# Patient Record
Sex: MALE | Race: WHITE | NOT HISPANIC OR LATINO
[De-identification: names, ages, dates, MRNs, and addresses within clinical notes are randomized per-mention and may not be internally consistent; named-entity substitution may affect disease eponyms.]

---

## 2018-01-18 ENCOUNTER — APPOINTMENT (OUTPATIENT)
Dept: FAMILY MEDICINE | Facility: CLINIC | Age: 73
End: 2018-01-18
Payer: MEDICARE

## 2018-01-18 VITALS — SYSTOLIC BLOOD PRESSURE: 122 MMHG | DIASTOLIC BLOOD PRESSURE: 70 MMHG | TEMPERATURE: 97.8 F

## 2018-01-18 PROCEDURE — 99213 OFFICE O/P EST LOW 20 MIN: CPT

## 2018-01-24 LAB — HBA1C MFR BLD HPLC: 5.5

## 2018-02-15 ENCOUNTER — RX RENEWAL (OUTPATIENT)
Age: 73
End: 2018-02-15

## 2018-02-26 ENCOUNTER — APPOINTMENT (OUTPATIENT)
Dept: FAMILY MEDICINE | Facility: CLINIC | Age: 73
End: 2018-02-26
Payer: MEDICARE

## 2018-02-26 ENCOUNTER — NON-APPOINTMENT (OUTPATIENT)
Age: 73
End: 2018-02-26

## 2018-02-26 VITALS
HEART RATE: 66 BPM | DIASTOLIC BLOOD PRESSURE: 80 MMHG | HEIGHT: 70 IN | WEIGHT: 260 LBS | SYSTOLIC BLOOD PRESSURE: 150 MMHG | RESPIRATION RATE: 17 BRPM | OXYGEN SATURATION: 98 % | TEMPERATURE: 97.5 F | BODY MASS INDEX: 37.22 KG/M2

## 2018-02-26 PROCEDURE — 93000 ELECTROCARDIOGRAM COMPLETE: CPT | Mod: 59

## 2018-02-26 PROCEDURE — G0438: CPT

## 2018-02-26 PROCEDURE — 90670 PCV13 VACCINE IM: CPT

## 2018-02-26 PROCEDURE — G0009: CPT

## 2018-03-02 ENCOUNTER — RX RENEWAL (OUTPATIENT)
Age: 73
End: 2018-03-02

## 2018-05-03 ENCOUNTER — APPOINTMENT (OUTPATIENT)
Dept: FAMILY MEDICINE | Facility: CLINIC | Age: 73
End: 2018-05-03
Payer: MEDICARE

## 2018-05-03 VITALS — DIASTOLIC BLOOD PRESSURE: 80 MMHG | TEMPERATURE: 98.1 F | SYSTOLIC BLOOD PRESSURE: 140 MMHG

## 2018-05-03 PROCEDURE — 99214 OFFICE O/P EST MOD 30 MIN: CPT

## 2018-05-15 ENCOUNTER — MESSAGE (OUTPATIENT)
Age: 73
End: 2018-05-15

## 2018-06-22 ENCOUNTER — APPOINTMENT (OUTPATIENT)
Dept: NEPHROLOGY | Facility: CLINIC | Age: 73
End: 2018-06-22
Payer: MEDICARE

## 2018-06-22 VITALS
SYSTOLIC BLOOD PRESSURE: 148 MMHG | HEIGHT: 70 IN | HEART RATE: 64 BPM | WEIGHT: 251.32 LBS | BODY MASS INDEX: 35.98 KG/M2 | DIASTOLIC BLOOD PRESSURE: 80 MMHG

## 2018-06-22 DIAGNOSIS — Z82.49 FAMILY HISTORY OF ISCHEMIC HEART DISEASE AND OTHER DISEASES OF THE CIRCULATORY SYSTEM: ICD-10-CM

## 2018-06-22 DIAGNOSIS — Z84.1 FAMILY HISTORY OF DISORDERS OF KIDNEY AND URETER: ICD-10-CM

## 2018-06-22 DIAGNOSIS — Z87.891 PERSONAL HISTORY OF NICOTINE DEPENDENCE: ICD-10-CM

## 2018-06-22 PROCEDURE — 36415 COLL VENOUS BLD VENIPUNCTURE: CPT

## 2018-06-22 PROCEDURE — 99205 OFFICE O/P NEW HI 60 MIN: CPT | Mod: 25

## 2018-06-22 RX ORDER — OSELTAMIVIR PHOSPHATE 75 MG/1
75 CAPSULE ORAL
Qty: 10 | Refills: 0 | Status: DISCONTINUED | COMMUNITY
Start: 2018-05-16

## 2018-06-24 ENCOUNTER — FORM ENCOUNTER (OUTPATIENT)
Age: 73
End: 2018-06-24

## 2018-06-25 ENCOUNTER — APPOINTMENT (OUTPATIENT)
Dept: ULTRASOUND IMAGING | Facility: HOSPITAL | Age: 73
End: 2018-06-25
Payer: MEDICARE

## 2018-06-25 ENCOUNTER — OUTPATIENT (OUTPATIENT)
Dept: OUTPATIENT SERVICES | Facility: HOSPITAL | Age: 73
LOS: 1 days | End: 2018-06-25
Payer: MEDICARE

## 2018-06-25 DIAGNOSIS — R80.9 PROTEINURIA, UNSPECIFIED: ICD-10-CM

## 2018-06-25 PROCEDURE — 76775 US EXAM ABDO BACK WALL LIM: CPT | Mod: 26

## 2018-06-25 PROCEDURE — 76775 US EXAM ABDO BACK WALL LIM: CPT

## 2018-06-26 PROBLEM — Z82.49 FAMILY HISTORY OF CORONARY ARTERY DISEASE: Status: ACTIVE | Noted: 2018-06-26

## 2018-06-26 PROBLEM — Z84.1 FAMILY HISTORY OF KIDNEY DISEASE: Status: ACTIVE | Noted: 2018-06-26

## 2018-06-28 LAB
25(OH)D3 SERPL-MCNC: 29.2 NG/ML
ALBUMIN SERPL ELPH-MCNC: 4.4 G/DL
ALP BLD-CCNC: 81 U/L
ALT SERPL-CCNC: 14 U/L
ANION GAP SERPL CALC-SCNC: 15 MMOL/L
APPEARANCE: CLEAR
AST SERPL-CCNC: 18 U/L
BACTERIA: NEGATIVE
BILIRUB SERPL-MCNC: 0.2 MG/DL
BILIRUBIN URINE: NEGATIVE
BLOOD URINE: ABNORMAL
BUN SERPL-MCNC: 24 MG/DL
CALCIUM SERPL-MCNC: 9.4 MG/DL
CALCIUM SERPL-MCNC: 9.4 MG/DL
CHLORIDE SERPL-SCNC: 104 MMOL/L
CO2 SERPL-SCNC: 23 MMOL/L
COLOR: YELLOW
CREAT SERPL-MCNC: 1.06 MG/DL
CREAT SPEC-SCNC: 108 MG/DL
GLUCOSE QUALITATIVE U: NEGATIVE MG/DL
GLUCOSE SERPL-MCNC: 120 MG/DL
HYALINE CASTS: 11 /LPF
KETONES URINE: NEGATIVE
LEUKOCYTE ESTERASE URINE: NEGATIVE
MICROALBUMIN 24H UR DL<=1MG/L-MCNC: 112.6 MG/DL
MICROALBUMIN/CREAT 24H UR-RTO: 1043 MG/G
MICROSCOPIC-UA: NORMAL
NITRITE URINE: NEGATIVE
PARATHYROID HORMONE INTACT: 94 PG/ML
PH URINE: 5.5
PHOSPHATE SERPL-MCNC: 3.1 MG/DL
POTASSIUM SERPL-SCNC: 4.4 MMOL/L
PROT SERPL-MCNC: 6.8 G/DL
PROTEIN URINE: 300 MG/DL
RED BLOOD CELLS URINE: 4 /HPF
SODIUM SERPL-SCNC: 142 MMOL/L
SPECIFIC GRAVITY URINE: 1.02
SQUAMOUS EPITHELIAL CELLS: 5 /HPF
UROBILINOGEN URINE: NEGATIVE MG/DL
WHITE BLOOD CELLS URINE: 1 /HPF

## 2018-07-10 LAB
CREAT 24H UR-MCNC: 1.5 G/24 H
CREAT ?TM UR-MCNC: 91 MG/DL
CREAT ?TM UR-MCNC: 91 MG/DL
CREAT ?TM UR-MCNC: 93 MG/DL
PROT 24H UR-MRATE: 115 MG/DL
PROT ?TM UR-MCNC: 24 HR
PROT UR-MCNC: 1898 MG/24 H
SPECIMEN VOL 24H UR: 1650 ML
U UREA: 882 MG/DL
UUN 24H UR-MRATE: 14.6 G/24H

## 2018-07-16 ENCOUNTER — APPOINTMENT (OUTPATIENT)
Dept: FAMILY MEDICINE | Facility: CLINIC | Age: 73
End: 2018-07-16
Payer: MEDICARE

## 2018-07-16 VITALS — TEMPERATURE: 98.1 F | DIASTOLIC BLOOD PRESSURE: 82 MMHG | SYSTOLIC BLOOD PRESSURE: 134 MMHG

## 2018-07-16 PROCEDURE — 99214 OFFICE O/P EST MOD 30 MIN: CPT

## 2018-07-16 NOTE — HISTORY OF PRESENT ILLNESS
[de-identified] : Presents to office for followup on his blood pressure. Patient recently saw Dr. Torres, nephrologist and her  evaluation of his proteinuria patient had a renal sonogram which was negative. Blood pressure  needs some improvement. Patient having a difficult time losing weight

## 2018-07-16 NOTE — ASSESSMENT
[FreeTextEntry1] : Presents to office for followup on his blood pressure. Patient recently saw Dr. Torres, nephrologist and her  evaluation of his proteinuria patient had a renal sonogram which was negative. Blood pressure  needs some improvement. Patient having a difficult time losing weight\par \par Pt and I discussed and was encouraged to lose weight\par Will start irbesartan 150mg\par RTO in 4 weeks

## 2018-08-16 ENCOUNTER — APPOINTMENT (OUTPATIENT)
Dept: FAMILY MEDICINE | Facility: CLINIC | Age: 73
End: 2018-08-16
Payer: MEDICARE

## 2018-08-16 VITALS — SYSTOLIC BLOOD PRESSURE: 112 MMHG | DIASTOLIC BLOOD PRESSURE: 62 MMHG

## 2018-08-16 PROCEDURE — 99213 OFFICE O/P EST LOW 20 MIN: CPT

## 2018-08-16 NOTE — ASSESSMENT
[FreeTextEntry1] : pt presents to office for followup on his blood pressure. Patient has been taking irbesartan for the last 4 weeks office no complaints today. Still has not lost any weight and compliant with diet.\par \par BP stable . Continue with Irbesartan\par RTO in 3 months

## 2018-08-16 NOTE — PHYSICAL EXAM
[No Acute Distress] : no acute distress [Clear to Auscultation] : lungs were clear to auscultation bilaterally [Regular Rhythm] : with a regular rhythm

## 2018-08-16 NOTE — HISTORY OF PRESENT ILLNESS
[de-identified] : pt presents to office for followup on his blood pressure. Patient has been taking irbesartan for the last 4 weeks office no complaints today. Still has not lost any weight and compliant with diet.

## 2018-12-04 ENCOUNTER — APPOINTMENT (OUTPATIENT)
Dept: FAMILY MEDICINE | Facility: CLINIC | Age: 73
End: 2018-12-04
Payer: MEDICARE

## 2018-12-04 VITALS — SYSTOLIC BLOOD PRESSURE: 135 MMHG | DIASTOLIC BLOOD PRESSURE: 71 MMHG

## 2018-12-04 DIAGNOSIS — M25.561 PAIN IN RIGHT KNEE: ICD-10-CM

## 2018-12-04 DIAGNOSIS — R29.898 OTHER SYMPTOMS AND SIGNS INVOLVING THE MUSCULOSKELETAL SYSTEM: ICD-10-CM

## 2018-12-04 PROCEDURE — 83036 HEMOGLOBIN GLYCOSYLATED A1C: CPT | Mod: QW

## 2018-12-04 PROCEDURE — 99215 OFFICE O/P EST HI 40 MIN: CPT | Mod: 25

## 2018-12-10 LAB — HBA1C MFR BLD HPLC: 6

## 2018-12-14 ENCOUNTER — LABORATORY RESULT (OUTPATIENT)
Age: 73
End: 2018-12-14

## 2018-12-14 ENCOUNTER — APPOINTMENT (OUTPATIENT)
Dept: NEPHROLOGY | Facility: CLINIC | Age: 73
End: 2018-12-14
Payer: MEDICARE

## 2018-12-14 VITALS — DIASTOLIC BLOOD PRESSURE: 80 MMHG | SYSTOLIC BLOOD PRESSURE: 146 MMHG

## 2018-12-14 VITALS
DIASTOLIC BLOOD PRESSURE: 94 MMHG | HEART RATE: 75 BPM | SYSTOLIC BLOOD PRESSURE: 152 MMHG | BODY MASS INDEX: 37.24 KG/M2 | HEIGHT: 70 IN | WEIGHT: 260.14 LBS | OXYGEN SATURATION: 95 %

## 2018-12-14 PROCEDURE — 99214 OFFICE O/P EST MOD 30 MIN: CPT

## 2019-02-14 ENCOUNTER — LABORATORY RESULT (OUTPATIENT)
Age: 74
End: 2019-02-14

## 2019-02-14 LAB
APPEARANCE: CLEAR
BILIRUBIN URINE: NEGATIVE
BLOOD URINE: ABNORMAL
COLOR: YELLOW
GLUCOSE QUALITATIVE U: NEGATIVE MG/DL
KETONES URINE: NEGATIVE
LEUKOCYTE ESTERASE URINE: NEGATIVE
NITRITE URINE: NEGATIVE
PH URINE: 6.5
PROTEIN URINE: 100 MG/DL
SPECIFIC GRAVITY URINE: 1.02
UROBILINOGEN URINE: NEGATIVE MG/DL

## 2019-02-15 LAB
CREAT SPEC-SCNC: 109 MG/DL
MICROALBUMIN 24H UR DL<=1MG/L-MCNC: 60 MG/DL
MICROALBUMIN/CREAT 24H UR-RTO: 550 MG/G

## 2019-03-04 ENCOUNTER — RX RENEWAL (OUTPATIENT)
Age: 74
End: 2019-03-04

## 2019-03-26 ENCOUNTER — RX RENEWAL (OUTPATIENT)
Age: 74
End: 2019-03-26

## 2019-03-29 ENCOUNTER — RX RENEWAL (OUTPATIENT)
Age: 74
End: 2019-03-29

## 2019-04-09 ENCOUNTER — APPOINTMENT (OUTPATIENT)
Dept: ORTHOPEDIC SURGERY | Facility: CLINIC | Age: 74
End: 2019-04-09
Payer: MEDICARE

## 2019-04-09 VITALS — BODY MASS INDEX: 37.22 KG/M2 | WEIGHT: 260 LBS | HEIGHT: 70 IN

## 2019-04-09 DIAGNOSIS — M79.89 OTHER SPECIFIED SOFT TISSUE DISORDERS: ICD-10-CM

## 2019-04-09 PROCEDURE — 73110 X-RAY EXAM OF WRIST: CPT | Mod: LT

## 2019-04-09 PROCEDURE — 99202 OFFICE O/P NEW SF 15 MIN: CPT

## 2019-04-09 NOTE — CONSULT LETTER
[Dear  ___] : Dear  [unfilled], [Consult Letter:] : I had the pleasure of evaluating your patient, [unfilled]. [Please see my note below.] : Please see my note below. [Sincerely,] : Sincerely, [FreeTextEntry2] : GAEL GOLDSTEIN [FreeTextEntry3] : Dr. Alexy Johnson

## 2019-04-09 NOTE — HISTORY OF PRESENT ILLNESS
[de-identified] : Patient is a RHD 73 year old male who presents c/o left dorsal wrist pain for 1-2x months. He has pain when lifting objects and are worsened with twisting motions of the wrist. The symptoms are localized over the dorsum of the wrist. He denies injury to the area and states that the pain was acute in nature. He denies tingling or numbness in the hand and fingers. Patient has not received medical treatment for this condition prior to today's visit and has additionally not attempted at home treatment.

## 2019-04-09 NOTE — ADDENDUM
[FreeTextEntry1] : I, Nelda Davis wrote this note acting as a scribe for Dr. Alexy Johnson on Apr 09, 2019.

## 2019-04-09 NOTE — DISCUSSION/SUMMARY
[de-identified] : The underlying pathophysiology was reviewed with the patient. Treatment options were discussed including; observation, NSAIDS, analgesics, bracing, and injection. \par \par Left wrist interosseus bone cysts.  \par Patient was advised to wear a wrist brace while performing strenuous activity and removed it when he is not. \par NSAIDs as tolerated. \par The patient was advised to soak hand in warm water and Epsom salt. \par He may return to this office for a cortisone injection if his symptoms persist or worsen. \par

## 2019-04-09 NOTE — END OF VISIT
[FreeTextEntry3] : I, Alexy Johnson MD, ordering physician, have read and attest that all the information, medical decision making and discharge instructions within are true and accurate.

## 2019-06-17 ENCOUNTER — OTHER (OUTPATIENT)
Age: 74
End: 2019-06-17

## 2019-06-17 ENCOUNTER — APPOINTMENT (OUTPATIENT)
Dept: NEPHROLOGY | Facility: CLINIC | Age: 74
End: 2019-06-17
Payer: MEDICARE

## 2019-06-17 VITALS
SYSTOLIC BLOOD PRESSURE: 138 MMHG | DIASTOLIC BLOOD PRESSURE: 85 MMHG | HEART RATE: 69 BPM | BODY MASS INDEX: 36.93 KG/M2 | OXYGEN SATURATION: 95 % | WEIGHT: 257.94 LBS | HEIGHT: 70 IN

## 2019-06-17 PROCEDURE — 99213 OFFICE O/P EST LOW 20 MIN: CPT

## 2019-06-17 NOTE — ASSESSMENT
[FreeTextEntry1] : 72 yo male with sub-nephrotic proteinuria, HTN, CLL, overweight\par Proteinuria may be secondary to hyperfiltration of being overweight with suboptimal BP control\par Monitor and trend proteinuria \par Modest protein intake\par Recheck flavia:cr ratio\par We will try to get lab test results from Dr. Sparks's office\par HTN: Relatively controlled. Consider optimization of ARB therapy for optimal BP control and proteinuric effects if needed\par Wt loss advised\par Avoid any chronic NSAID use \par All questions were answered

## 2019-06-17 NOTE — PHYSICAL EXAM
[General Appearance - Alert] : alert [General Appearance - In No Acute Distress] : in no acute distress [General Appearance - Well Nourished] : well nourished [Sclera] : the sclera and conjunctiva were normal [Neck Appearance] : the appearance of the neck was normal [Auscultation Breath Sounds / Voice Sounds] : lungs were clear to auscultation bilaterally [Heart Rate And Rhythm] : heart rate was normal and rhythm regular [Heart Sounds] : normal S1 and S2 [Murmurs] : no murmurs [Heart Sounds Pericardial Friction Rub] : no pericardial rub [Edema] : there was no peripheral edema [Bowel Sounds] : normal bowel sounds [Abdomen Soft] : soft [Abdomen Tenderness] : non-tender [Involuntary Movements] : no involuntary movements were seen [] : no rash [No Focal Deficits] : no focal deficits [Affect] : the affect was normal [Oriented To Time, Place, And Person] : oriented to person, place, and time [Mood] : the mood was normal

## 2019-06-17 NOTE — HISTORY OF PRESENT ILLNESS
[FreeTextEntry1] : 74 yo male with history of HTN 15 yrs, CLL, here for followup of proteinuria. \par He follows with Dr. Mu Sparks for CLL and reportedly had labs done there.\par He has left wrist pain and saw a neurologist and  orthopedist.  As per patient he has no true diagnosis.\par He intermittently takes Advil for pain but rare.\par

## 2019-06-19 LAB
APPEARANCE: CLEAR
BACTERIA: NEGATIVE
BILIRUBIN URINE: NEGATIVE
BLOOD URINE: NEGATIVE
COLOR: NORMAL
CREAT SPEC-SCNC: 116 MG/DL
GLUCOSE QUALITATIVE U: NEGATIVE
HYALINE CASTS: 1 /LPF
KETONES URINE: NEGATIVE
LEUKOCYTE ESTERASE URINE: NEGATIVE
MICROALBUMIN 24H UR DL<=1MG/L-MCNC: 54.7 MG/DL
MICROALBUMIN/CREAT 24H UR-RTO: 472 MG/G
MICROSCOPIC-UA: NORMAL
NITRITE URINE: NEGATIVE
PH URINE: 6
PROTEIN URINE: ABNORMAL
RED BLOOD CELLS URINE: 2 /HPF
SPECIFIC GRAVITY URINE: 1.02
SQUAMOUS EPITHELIAL CELLS: 2 /HPF
UROBILINOGEN URINE: NORMAL
WHITE BLOOD CELLS URINE: 1 /HPF

## 2019-08-01 ENCOUNTER — APPOINTMENT (OUTPATIENT)
Dept: ORTHOPEDIC SURGERY | Facility: CLINIC | Age: 74
End: 2019-08-01
Payer: MEDICARE

## 2019-08-01 DIAGNOSIS — M65.9 SYNOVITIS AND TENOSYNOVITIS, UNSPECIFIED: ICD-10-CM

## 2019-08-01 PROCEDURE — 99213 OFFICE O/P EST LOW 20 MIN: CPT | Mod: 25

## 2019-08-01 PROCEDURE — 20605 DRAIN/INJ JOINT/BURSA W/O US: CPT | Mod: LT

## 2019-08-01 NOTE — HISTORY OF PRESENT ILLNESS
[de-identified] : Patient is a RHD 73 year old male who presents who presents for a followup visit involving the left dorsal wrist pain since February 2018. He was last seen in the office on 04/09/2019 and was diagnosed with left wrist interosseus bone cysts and was treated conservatively with a brace. He did not receive a cortisone injection. Today he states that he continues to have pain when lifting objects, worsened with twisting motions of the wrist. There is marginal improvement compared to his initial visit. The symptoms are localized over the dorsum of the wrist. He denies injury to the area and states that the pain was acute in nature. He denies tingling or numbness in the hand and fingers.

## 2019-08-01 NOTE — PHYSICAL EXAM
[de-identified] : Patient is WDWN, alert, and in no acute distress. Breathing is unlabored. He is grossly oriented to person, place, and time. \par \par Left Wrist: Wrist brace was removed for examination. There is no tenderness to palpation. No swelling or deformities. No discoloration. The ROM is full. Pain with rotation. There is no joint instability on provocative testing. Sensation is intact to light touch. \par Strength: extension, flexion, ulnar deviation and radial deviation 5/5.  [de-identified] : Reviewed from 04/09/2019: AP, lateral and oblique views of the left wrist were obtained today and revealed carpal  interosseous bone cysts. No evidence of fx or osteoarthritis.

## 2019-08-01 NOTE — ADDENDUM
[FreeTextEntry1] : I, Nelda Davis wrote this note acting as a scribe for Dr. Alexy Johnson on Aug 01, 2019.

## 2019-08-01 NOTE — DISCUSSION/SUMMARY
[de-identified] : The patient wishes to proceed with a cortisone injection at this time. The skin was prepped with alcohol and sprayed with Ethyl Chloride. An injection of 0.5 cc 1% Lidocaine without epinephrine, 0.25 cc Kenalog 40 mg, and 0.25 cc Dexamethasone was administered into the left dorsal radiocarpal joint space. The patient tolerated the procedure well. Apply ice. Follow up as needed.\par  \par Patient was advised to continue to wear the wrist brace while performing strenuous activity and removed it when he is not. \par The patient was advised to soak hand in warm water and Epsom salt. \par NSAIDs as tolerated. \par He may return to this office for another cortisone injection if his symptoms persist or worsen. \par

## 2019-09-01 ENCOUNTER — RX RENEWAL (OUTPATIENT)
Age: 74
End: 2019-09-01

## 2019-11-30 ENCOUNTER — RX RENEWAL (OUTPATIENT)
Age: 74
End: 2019-11-30

## 2020-01-14 ENCOUNTER — APPOINTMENT (OUTPATIENT)
Dept: NEPHROLOGY | Facility: CLINIC | Age: 75
End: 2020-01-14
Payer: MEDICARE

## 2020-01-14 VITALS
BODY MASS INDEX: 37.01 KG/M2 | SYSTOLIC BLOOD PRESSURE: 125 MMHG | HEART RATE: 85 BPM | OXYGEN SATURATION: 95 % | DIASTOLIC BLOOD PRESSURE: 76 MMHG | WEIGHT: 257.94 LBS

## 2020-01-14 PROCEDURE — 99213 OFFICE O/P EST LOW 20 MIN: CPT

## 2020-01-14 NOTE — HISTORY OF PRESENT ILLNESS
[FreeTextEntry1] : 75 yo male with history of HTN 15 yrs, CLL, here for followup of proteinuria. \par He follows with Dr. Mu Sparks for CLL and had labs done there.\par He was diagnosed with early parkinson's by Dr Eddy and started on sinemet\par Kids bought a stationary bike

## 2020-01-14 NOTE — ASSESSMENT
[FreeTextEntry1] : 73 yo male with sub-nephrotic proteinuria, HTN, CLL, overweight\par Proteinuria may be secondary to hyperfiltration of being overweight \par Monitor and trend proteinuria \par Modest protein intake\par Recheck flavia:cr ratio\par We will try to get lab test results from Dr. Sparks's office\par HTN: well controlled. \par Wt loss advised\par Avoid any chronic NSAID use \par All questions were answered

## 2020-01-14 NOTE — PHYSICAL EXAM
[General Appearance - Alert] : alert [General Appearance - In No Acute Distress] : in no acute distress [General Appearance - Well Nourished] : well nourished [Sclera] : the sclera and conjunctiva were normal [Outer Ear] : the ears and nose were normal in appearance [Neck Appearance] : the appearance of the neck was normal [Auscultation Breath Sounds / Voice Sounds] : lungs were clear to auscultation bilaterally [Heart Rate And Rhythm] : heart rate was normal and rhythm regular [Heart Sounds] : normal S1 and S2 [Murmurs] : no murmurs [Heart Sounds Pericardial Friction Rub] : no pericardial rub [Edema] : there was no peripheral edema [Abdomen Soft] : soft [Bowel Sounds] : normal bowel sounds [Abdomen Tenderness] : non-tender [Involuntary Movements] : no involuntary movements were seen [No Focal Deficits] : no focal deficits [] : no rash [Oriented To Time, Place, And Person] : oriented to person, place, and time [Affect] : the affect was normal [Mood] : the mood was normal

## 2020-01-16 LAB
APPEARANCE: CLEAR
BACTERIA: NEGATIVE
BILIRUBIN URINE: NEGATIVE
BLOOD URINE: NEGATIVE
COLOR: NORMAL
CREAT SPEC-SCNC: 106 MG/DL
GLUCOSE QUALITATIVE U: NEGATIVE
HYALINE CASTS: 1 /LPF
KETONES URINE: NEGATIVE
LEUKOCYTE ESTERASE URINE: NEGATIVE
MICROALBUMIN 24H UR DL<=1MG/L-MCNC: 46.1 MG/DL
MICROALBUMIN/CREAT 24H UR-RTO: 435 MG/G
MICROSCOPIC-UA: NORMAL
NITRITE URINE: NEGATIVE
PH URINE: 6
PROTEIN URINE: ABNORMAL
RED BLOOD CELLS URINE: 4 /HPF
SPECIFIC GRAVITY URINE: 1.02
SQUAMOUS EPITHELIAL CELLS: 3 /HPF
UROBILINOGEN URINE: NORMAL
WHITE BLOOD CELLS URINE: 2 /HPF

## 2020-02-25 ENCOUNTER — APPOINTMENT (OUTPATIENT)
Dept: FAMILY MEDICINE | Facility: CLINIC | Age: 75
End: 2020-02-25
Payer: MEDICARE

## 2020-02-25 VITALS
SYSTOLIC BLOOD PRESSURE: 120 MMHG | OXYGEN SATURATION: 93 % | TEMPERATURE: 98 F | DIASTOLIC BLOOD PRESSURE: 70 MMHG | RESPIRATION RATE: 18 BRPM | HEART RATE: 79 BPM

## 2020-02-25 PROCEDURE — 99214 OFFICE O/P EST MOD 30 MIN: CPT

## 2020-02-25 NOTE — PHYSICAL EXAM
[Normal] : no acute distress, well-nourished, well developed, well appearing [de-identified] : AA&Ox3

## 2020-02-25 NOTE — PLAN
[FreeTextEntry1] : Obesity\par - Encouraged exercise, weight loss, and healthy diet\par - F/u in 8 weeks for weight check\par \par Hypothyroidism\par - Blood work to include TSH, drawn in office today\par \par Health maintenance\par - Flu and PNA vaccine UTD\par \par Renewed medications

## 2020-02-25 NOTE — ADDENDUM
[FreeTextEntry1] : I, Maria Eugenia Juradoin, acted solely as a scribe for Dr. Berry on this date 02/25/2020.\par \par All medical record entries made by the Scribe were at my, Dr. Berry, direction and personally dictated by me on 02/25/2020. I have reviewed the chart and agree that the record accurately reflects my personal performance of the history, physical exam, assessment and plan.  I have also personally directed, reviewed and agreed with the chart.

## 2020-02-25 NOTE — HISTORY OF PRESENT ILLNESS
[de-identified] : 75y/o M with PMHx of CLL, HTN (Metoprolol, Irbesartan), HLD (Simvastatin), Hypothyroidism (Levothyroxine), BPH (Tamsulosin), and Gout (Allopurinol) presents to the office for medication renewal. Pt saw nephro 1 month ago for proteinuria, suspects it is secondary to hyperfiltration of being overweight. Pt states he was recently dx with Parkinsonism due to shuffling gait and tremor of left hand, and was put on Sinemet. Pt states tremor of left hand has decreased with medication. Recently started PT for gait and exercise. BP in office is 120/70. Offers no new complaints.

## 2020-02-27 LAB — TSH SERPL-ACNC: 2.93 UIU/ML

## 2020-03-07 ENCOUNTER — RX RENEWAL (OUTPATIENT)
Age: 75
End: 2020-03-07

## 2020-04-08 ENCOUNTER — APPOINTMENT (OUTPATIENT)
Dept: DISASTER EMERGENCY | Facility: CLINIC | Age: 75
End: 2020-04-08

## 2020-05-04 ENCOUNTER — APPOINTMENT (OUTPATIENT)
Dept: FAMILY MEDICINE | Facility: CLINIC | Age: 75
End: 2020-05-04
Payer: MEDICARE

## 2020-05-04 DIAGNOSIS — R53.1 WEAKNESS: ICD-10-CM

## 2020-05-04 DIAGNOSIS — U07.1 COVID-19: ICD-10-CM

## 2020-05-04 DIAGNOSIS — R53.83 OTHER FATIGUE: ICD-10-CM

## 2020-05-04 PROCEDURE — 99214 OFFICE O/P EST MOD 30 MIN: CPT | Mod: CS,95

## 2020-05-04 NOTE — REVIEW OF SYSTEMS
[Fatigue] : fatigue [Recent Change In Weight] : ~T recent weight change [Muscle Weakness] : muscle weakness [Unsteady Walk] : ataxia [Negative] : Respiratory

## 2020-05-19 ENCOUNTER — APPOINTMENT (OUTPATIENT)
Dept: FAMILY MEDICINE | Facility: CLINIC | Age: 75
End: 2020-05-19
Payer: MEDICARE

## 2020-05-19 PROCEDURE — 99442: CPT | Mod: 95

## 2020-05-26 ENCOUNTER — APPOINTMENT (OUTPATIENT)
Dept: FAMILY MEDICINE | Facility: CLINIC | Age: 75
End: 2020-05-26
Payer: MEDICARE

## 2020-05-26 PROCEDURE — 99442: CPT | Mod: 95

## 2020-06-05 ENCOUNTER — APPOINTMENT (OUTPATIENT)
Dept: FAMILY MEDICINE | Facility: CLINIC | Age: 75
End: 2020-06-05
Payer: MEDICARE

## 2020-06-05 VITALS — SYSTOLIC BLOOD PRESSURE: 122 MMHG | TEMPERATURE: 97.9 F | DIASTOLIC BLOOD PRESSURE: 76 MMHG

## 2020-06-05 VITALS — WEIGHT: 232 LBS | BODY MASS INDEX: 33.29 KG/M2

## 2020-06-05 DIAGNOSIS — R39.9 UNSPECIFIED SYMPTOMS AND SIGNS INVOLVING THE GENITOURINARY SYSTEM: ICD-10-CM

## 2020-06-05 LAB
BILIRUB UR QL STRIP: NEGATIVE
CLARITY UR: NORMAL
COLLECTION METHOD: NORMAL
GLUCOSE UR-MCNC: NEGATIVE
HCG UR QL: 3.2 EU/DL
HGB UR QL STRIP.AUTO: ABNORMAL
KETONES UR-MCNC: NORMAL
LEUKOCYTE ESTERASE UR QL STRIP: NEGATIVE
NITRITE UR QL STRIP: NEGATIVE
PH UR STRIP: 5.5
PROT UR STRIP-MCNC: ABNORMAL
SP GR UR STRIP: 1.01

## 2020-06-05 PROCEDURE — 99214 OFFICE O/P EST MOD 30 MIN: CPT | Mod: 25

## 2020-06-05 PROCEDURE — 81003 URINALYSIS AUTO W/O SCOPE: CPT | Mod: QW

## 2020-06-05 NOTE — PHYSICAL EXAM
[Normal] : affect was normal and insight and judgment were intact [de-identified] : uses cane for ambulation

## 2020-06-05 NOTE — REVIEW OF SYSTEMS
[Nocturia] : nocturia [Frequency] : frequency [Joint Pain] : joint pain [Muscle Pain] : muscle pain [Back Pain] : back pain [Negative] : Integumentary

## 2020-06-08 LAB — BACTERIA UR CULT: NORMAL

## 2020-06-15 ENCOUNTER — APPOINTMENT (OUTPATIENT)
Dept: UROLOGY | Facility: CLINIC | Age: 75
End: 2020-06-15

## 2020-06-25 ENCOUNTER — APPOINTMENT (OUTPATIENT)
Dept: UROLOGY | Facility: CLINIC | Age: 75
End: 2020-06-25
Payer: MEDICARE

## 2020-06-25 LAB
BILIRUB UR QL STRIP: NORMAL
CLARITY UR: CLEAR
COLLECTION METHOD: NORMAL
GLUCOSE UR-MCNC: NEGATIVE
HCG UR QL: 0.2 EU/DL
HGB UR QL STRIP.AUTO: NEGATIVE
KETONES UR-MCNC: NEGATIVE
LEUKOCYTE ESTERASE UR QL STRIP: NEGATIVE
NITRITE UR QL STRIP: NEGATIVE
PH UR STRIP: 6
PROT UR STRIP-MCNC: 30
SP GR UR STRIP: 1.02

## 2020-06-25 PROCEDURE — 81002 URINALYSIS NONAUTO W/O SCOPE: CPT

## 2020-06-25 PROCEDURE — 51798 US URINE CAPACITY MEASURE: CPT

## 2020-06-25 PROCEDURE — 99204 OFFICE O/P NEW MOD 45 MIN: CPT | Mod: 25

## 2020-06-25 NOTE — HISTORY OF PRESENT ILLNESS
[Urinary Frequency] : urinary frequency [Nocturia] : nocturia [FreeTextEntry1] : CHA GONZALEZ is a 74 year M who presents for urinary frequency daytime and nighttime. Occasional incontinence with urge. Unsure if he empties fully- on tamsulosin. No gross hematuria.  F/u with Jason (Nephrology) and Dr. Berry---> has h/o microhematuria.  4 rbc 1/2020, dip 6/5/2020---> 2+ protein, 1+ blood. Brought urine from home today for eval.\par \par PMH: hypothyroid, gout, cholesterol, htn, BPH, Parkinson's, CLL, recent covid infection 3/2020, ckd\par PSH: vasectomy\par FH: no known sig  FH\par Meds: synthroid, simvastatin, allopurinol, tamsulosin, vit D3, asa, irbesartan, carvidopa-levodopa\par SH: former smoker (quit > 30 years ago), \par Allergies: NKDA\par

## 2020-06-25 NOTE — PHYSICAL EXAM
[General Appearance - Well Developed] : well developed [General Appearance - Well Nourished] : well nourished [Normal Appearance] : normal appearance [Well Groomed] : well groomed [General Appearance - In No Acute Distress] : no acute distress [Edema] : no peripheral edema [Respiration, Rhythm And Depth] : normal respiratory rhythm and effort [Exaggerated Use Of Accessory Muscles For Inspiration] : no accessory muscle use [Abdomen Soft] : soft [Abdomen Tenderness] : non-tender [Costovertebral Angle Tenderness] : no ~M costovertebral angle tenderness [Penis Abnormality] : normal circumcised penis [Urethral Meatus] : meatus normal [Scrotum] : the scrotum was normal [Epididymis] : the epididymides were normal [Urinary Bladder Findings] : the bladder was normal on palpation [Testes Mass (___cm)] : there were no testicular masses [Rectal Exam - Rectum] : digital rectal exam was normal [No Prostate Nodules] : no prostate nodules [Prostate Size ___ (0-4)] : prostate size [unfilled] (scale: 0-4) [Normal Station and Gait] : the gait and station were normal for the patient's age [] : no rash [No Focal Deficits] : no focal deficits [Affect] : the affect was normal [Oriented To Time, Place, And Person] : oriented to person, place, and time [No Palpable Adenopathy] : no palpable adenopathy [Not Anxious] : not anxious [Mood] : the mood was normal

## 2020-06-26 LAB
APPEARANCE: CLEAR
BACTERIA: NEGATIVE
BILIRUBIN URINE: NEGATIVE
BLOOD URINE: NEGATIVE
COLOR: NORMAL
GLUCOSE QUALITATIVE U: NEGATIVE
HYALINE CASTS: 1 /LPF
KETONES URINE: NEGATIVE
LEUKOCYTE ESTERASE URINE: NEGATIVE
MICROSCOPIC-UA: NORMAL
NITRITE URINE: NEGATIVE
PH URINE: 6
PROTEIN URINE: ABNORMAL
PSA SERPL-MCNC: 4.46 NG/ML
RED BLOOD CELLS URINE: 5 /HPF
SPECIFIC GRAVITY URINE: 1.01
SQUAMOUS EPITHELIAL CELLS: 3 /HPF
URINE CYTOLOGY: NORMAL
UROBILINOGEN URINE: NORMAL
WHITE BLOOD CELLS URINE: 2 /HPF

## 2020-07-15 ENCOUNTER — APPOINTMENT (OUTPATIENT)
Dept: UROLOGY | Facility: CLINIC | Age: 75
End: 2020-07-15

## 2020-07-15 ENCOUNTER — APPOINTMENT (OUTPATIENT)
Dept: NEPHROLOGY | Facility: CLINIC | Age: 75
End: 2020-07-15
Payer: MEDICARE

## 2020-07-15 ENCOUNTER — APPOINTMENT (OUTPATIENT)
Dept: ULTRASOUND IMAGING | Facility: IMAGING CENTER | Age: 75
End: 2020-07-15

## 2020-07-15 PROCEDURE — 99214 OFFICE O/P EST MOD 30 MIN: CPT | Mod: 95

## 2020-07-15 NOTE — HISTORY OF PRESENT ILLNESS
[Medical Office: ([facility])___] : at the medical office located in  [Home] : at home, [unfilled] , at the time of the visit. [Spouse] : spouse [Verbal consent obtained from patient] : the patient, [unfilled] [FreeTextEntry1] : 73 yo male with history of HTN 15 yrs, CLL, on a telehealth visit for follow-up for proteinuria. \par He was diagnosed with COVID the beginning of April was able to take care of it at home.  His wife is very involved.  He had fevers myalgias could not get out of bed, had diarrhea, and had delirium.  His creatinine was 2.0 which was higher than his baseline at that time.  Kept him out of the hospital.  He lost 20 pounds.  His wife had subsequently tested positive for antibodies.  Now getting physical therapy and feeling much better.\par He is also seeing urology for cystoscopy.\par He follows with Dr. Mu Sparks for CLL and follows with him.\par He was diagnosed with early parkinson's by Dr Eddy and still on sinemet\par He also follows with Dr. Mustapha Murcia, psychiatrist blood work apparently done with him recently.  Phone #[phone].\par Meds were reviewed.

## 2020-07-15 NOTE — REVIEW OF SYSTEMS
[Recent Weight Loss (___ Lbs)] : recent [unfilled] ~Ulb weight loss [Negative] : Heme/Lymph [de-identified] : Parkinson's

## 2020-07-15 NOTE — ASSESSMENT
[FreeTextEntry1] : 73 yo male with sub-nephrotic proteinuria, HTN, CLL, recent COVID\par Proteinuria : secondary to hyperfiltration of being overweight \par Monitor and trend proteinuria which appears to be better.\par Modest protein intake\par Recheck flavia:cr ratio in 6 months\par Getting cystoscopy with urology.\par We will try to get lab test results more recent to see if he recovered from his mild BRAEDEN during COVID.\par HTN: well controlled. \par Wt loss should be maintained.\par Avoid any chronic NSAID use \par All questions were answered\par Follow-up in 6 months.

## 2020-08-05 ENCOUNTER — OUTPATIENT (OUTPATIENT)
Dept: OUTPATIENT SERVICES | Facility: HOSPITAL | Age: 75
LOS: 1 days | End: 2020-08-05
Payer: MEDICARE

## 2020-08-05 ENCOUNTER — APPOINTMENT (OUTPATIENT)
Dept: UROLOGY | Facility: CLINIC | Age: 75
End: 2020-08-05
Payer: MEDICARE

## 2020-08-05 VITALS — HEART RATE: 82 BPM | RESPIRATION RATE: 17 BRPM | DIASTOLIC BLOOD PRESSURE: 87 MMHG | SYSTOLIC BLOOD PRESSURE: 146 MMHG

## 2020-08-05 VITALS — TEMPERATURE: 97.9 F

## 2020-08-05 DIAGNOSIS — R35.0 FREQUENCY OF MICTURITION: ICD-10-CM

## 2020-08-05 PROCEDURE — 76775 US EXAM ABDO BACK WALL LIM: CPT

## 2020-08-05 PROCEDURE — 52000 CYSTOURETHROSCOPY: CPT

## 2020-08-05 PROCEDURE — 76775 US EXAM ABDO BACK WALL LIM: CPT | Mod: 26

## 2020-08-06 DIAGNOSIS — N40.1 BENIGN PROSTATIC HYPERPLASIA WITH LOWER URINARY TRACT SYMPTOMS: ICD-10-CM

## 2020-08-06 DIAGNOSIS — R31.29 OTHER MICROSCOPIC HEMATURIA: ICD-10-CM

## 2020-12-11 NOTE — ASSESSMENT
Palliative Medicine Social Work      This SW met with patient for brief visit. Patient lying in bed watching TV. Alert and oriented to all spheres, anxiety about the same as yesterday. Provided referrals from [facility] (formerly [facility]). Patient does not remember speaking to MSW from [place] [id number] prior to admission. She had requested referrals for financial and estate planning. Information provided. Patient continues to express worries about being to manage bills, finances and paperwork on her own when she is home. Discussed plan for SNF. Patient wondering why she can't just go home. Discussed concern for patient safety if she discharges home without rehab. Patient considering. Will continue to follow for support. Thank you for the opportunity to be involved in the care of Ms. Yadira Finney and her family.     Sánchez Alonso LMSW, Supervisee in Social Work  Palliative Medicine   [phone]    Start time: 14:30  End time: 14:45 [FreeTextEntry1] : Bladder scan: 0 cc...\par voided previously at home> 1 hour prior\par \par No recent psa on outside labs-- will arrange today\par \par we discussed microscopic hematuria w/u, in pt with some degree of CKD--> most recent labs show improvement over prior with gfr 54. (6/2020).  Given sx of urge/incont, would recommend at least Renal US, +- MR urogram as opposed to CT scan (given the CKD history), and cystoscopy to fully eval.\par \par Can cont tamsulosin\par Could consider anticholinergic for OAB sx and urge incontinence.\par Options for urodynamics reviewed, but given apparent emptying on bladder scan will hold off at present\par \par RTC for renal US and cysto in next few weeks recommended\par psa, urine cytol, u/a today.\par

## 2021-01-08 ENCOUNTER — TRANSCRIPTION ENCOUNTER (OUTPATIENT)
Age: 76
End: 2021-01-08

## 2021-02-04 ENCOUNTER — APPOINTMENT (OUTPATIENT)
Dept: UROLOGY | Facility: CLINIC | Age: 76
End: 2021-02-04
Payer: MEDICARE

## 2021-02-04 PROCEDURE — 99213 OFFICE O/P EST LOW 20 MIN: CPT | Mod: 25

## 2021-02-04 PROCEDURE — 51798 US URINE CAPACITY MEASURE: CPT

## 2021-02-04 NOTE — HISTORY OF PRESENT ILLNESS
[FreeTextEntry1] : CHA GONZALEZ is a now 75 year M who presents for urinary frequency daytime and nighttime. Occasional incontinence with urge. Unsure if he empties fully- on tamsulosin. No gross hematuria. F/u with Jason (Nephrology) and Dr. Berry---> has h/o microhematuria. 4 rbc 1/2020, dip 6/5/2020---> 2+ protein, 1+ blood. Brought urine from home today for eval.\par \par Has not had any additional w/u since last appt 6/2020; urine cytology was negative, and PSA wnl for age-related range.\par \par Feeling no sig changes.\par \par Increased parkinson med from 3 to 4 times per day.\par \par No flank or abdominal pain, No gross hematuria.  Still with urge, occasional urge-incont- no sig change [Urinary Frequency] : urinary frequency [Nocturia] : nocturia

## 2021-02-04 NOTE — ASSESSMENT
[FreeTextEntry1] : Pt prefers to avoid cysto, CT imaging at this time.\par \par No changes to voiding sx. Increase to carb/levodopa reviewed and added.\par \par D/w pt- no urge to void now (prior to appt):\par \par Bladder scan today: 15 cc PVR.\par \par recommend again at least renal/bladder US- can be done at radiology\par \par will review by phone or telehealth.

## 2021-02-11 ENCOUNTER — RX CHANGE (OUTPATIENT)
Age: 76
End: 2021-02-11

## 2021-02-26 ENCOUNTER — APPOINTMENT (OUTPATIENT)
Dept: ULTRASOUND IMAGING | Facility: HOSPITAL | Age: 76
End: 2021-02-26

## 2021-03-08 ENCOUNTER — OUTPATIENT (OUTPATIENT)
Dept: OUTPATIENT SERVICES | Facility: HOSPITAL | Age: 76
LOS: 1 days | End: 2021-03-08
Payer: MEDICARE

## 2021-03-08 ENCOUNTER — RESULT REVIEW (OUTPATIENT)
Age: 76
End: 2021-03-08

## 2021-03-08 ENCOUNTER — APPOINTMENT (OUTPATIENT)
Dept: ULTRASOUND IMAGING | Facility: HOSPITAL | Age: 76
End: 2021-03-08
Payer: MEDICARE

## 2021-03-08 DIAGNOSIS — R31.29 OTHER MICROSCOPIC HEMATURIA: ICD-10-CM

## 2021-03-08 DIAGNOSIS — N40.1 BENIGN PROSTATIC HYPERPLASIA WITH LOWER URINARY TRACT SYMPTOMS: ICD-10-CM

## 2021-03-08 PROCEDURE — 76770 US EXAM ABDO BACK WALL COMP: CPT | Mod: 26

## 2021-03-08 PROCEDURE — 76770 US EXAM ABDO BACK WALL COMP: CPT

## 2021-03-09 ENCOUNTER — APPOINTMENT (OUTPATIENT)
Dept: UROLOGY | Facility: CLINIC | Age: 76
End: 2021-03-09
Payer: MEDICARE

## 2021-03-09 DIAGNOSIS — R31.29 OTHER MICROSCOPIC HEMATURIA: ICD-10-CM

## 2021-03-09 PROCEDURE — 99212 OFFICE O/P EST SF 10 MIN: CPT | Mod: 95

## 2021-04-21 NOTE — ASSESSMENT
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 unchanged single septation (thin) cyst right upper pole as well. No concerning renal mass, no hydro, no stone.\par \par Prostate enlarged, bladder with some residual, but likely situational- was only 15 cc PVR on recent visit 1 month ago.\par \par D/w pt and wife- will plan next f/u in approx 6 months

## 2021-04-21 NOTE — HISTORY OF PRESENT ILLNESS
[FreeTextEntry1] : The patient-doctor relationship has been established in a face to face fashion via real time video/audio HIPAA compliant communication using telemedicine software. The patient's identity has been confirmed. The patient was previously emailed a copy of the telemedicine consent. They have had a chance to review and has now given verbal consent and has requested care to be assessed and treated via telemedicine. They understand there may be limitations in this process, and that they may need further followup care in the office and/or hospital settings.\par \par Verbal consent given on Mar  9 2021 11:40AM\par \par CHA GONZALEZ is a 75 year M who presents for f/u of renal US. Seen 2/4/21, and f/u of neurogenic/overactive bladder in setting of Parkinson's disease. H/o microhematuria with w/u 6/2020.\par \par Renal US done to ensure no hydro; reviewed today with pt and wife. O/w feeling well at this time; no new complaints.\par \par 03/09/2021 \par \par The patient denies fevers, chills, nausea and/or vomiting, and no unexplained weight loss.\par \par All pertinent parts of the patient PFSH (past medical, family, and social histories), laboratory, radiological studies and available physician notes were reviewed prior to starting the face-to-face portion of the telemedicine visit. Questionnaire results, where appropriate, were discussed with the patient.\par

## 2021-05-06 ENCOUNTER — RX RENEWAL (OUTPATIENT)
Age: 76
End: 2021-05-06

## 2021-08-03 ENCOUNTER — NON-APPOINTMENT (OUTPATIENT)
Age: 76
End: 2021-08-03

## 2021-08-03 ENCOUNTER — LABORATORY RESULT (OUTPATIENT)
Age: 76
End: 2021-08-03

## 2021-08-23 ENCOUNTER — NON-APPOINTMENT (OUTPATIENT)
Age: 76
End: 2021-08-23

## 2021-08-23 ENCOUNTER — APPOINTMENT (OUTPATIENT)
Dept: FAMILY MEDICINE | Facility: CLINIC | Age: 76
End: 2021-08-23
Payer: MEDICARE

## 2021-08-23 VITALS
SYSTOLIC BLOOD PRESSURE: 138 MMHG | DIASTOLIC BLOOD PRESSURE: 78 MMHG | RESPIRATION RATE: 16 BRPM | OXYGEN SATURATION: 96 % | HEART RATE: 63 BPM | WEIGHT: 240 LBS | HEIGHT: 70 IN | BODY MASS INDEX: 34.36 KG/M2 | TEMPERATURE: 96.8 F

## 2021-08-23 DIAGNOSIS — R73.9 HYPERGLYCEMIA, UNSPECIFIED: ICD-10-CM

## 2021-08-23 LAB
ALBUMIN SERPL ELPH-MCNC: 4.6 G/DL
ALP BLD-CCNC: 120 U/L
ALT SERPL-CCNC: 10 U/L
ANION GAP SERPL CALC-SCNC: 15 MMOL/L
APPEARANCE: CLEAR
AST SERPL-CCNC: 17 U/L
BACTERIA: NEGATIVE
BASOPHILS # BLD AUTO: 0 K/UL
BASOPHILS NFR BLD AUTO: 0 %
BILIRUB SERPL-MCNC: 0.4 MG/DL
BILIRUBIN URINE: NEGATIVE
BLOOD URINE: NEGATIVE
BUN SERPL-MCNC: 21 MG/DL
CALCIUM SERPL-MCNC: 9.4 MG/DL
CHLORIDE SERPL-SCNC: 105 MMOL/L
CHOLEST SERPL-MCNC: 168 MG/DL
CO2 SERPL-SCNC: 21 MMOL/L
COLOR: NORMAL
CREAT SERPL-MCNC: 1.58 MG/DL
EOSINOPHIL # BLD AUTO: 0 K/UL
EOSINOPHIL NFR BLD AUTO: 0 %
ESTIMATED AVERAGE GLUCOSE: 120 MG/DL
FOLATE SERPL-MCNC: >20 NG/ML
GLUCOSE QUALITATIVE U: NEGATIVE
GLUCOSE SERPL-MCNC: 105 MG/DL
HBA1C MFR BLD HPLC: 5.8 %
HCT VFR BLD CALC: 45.8 %
HDLC SERPL-MCNC: 54 MG/DL
HGB BLD-MCNC: 13.9 G/DL
HYALINE CASTS: 0 /LPF
KETONES URINE: NEGATIVE
LDLC SERPL CALC-MCNC: 80 MG/DL
LEUKOCYTE ESTERASE URINE: NEGATIVE
LYMPHOCYTES # BLD AUTO: 45.29 K/UL
LYMPHOCYTES NFR BLD AUTO: 83 %
MAN DIFF?: NORMAL
MCHC RBC-ENTMCNC: 30.3 GM/DL
MCHC RBC-ENTMCNC: 31.5 PG
MCV RBC AUTO: 103.9 FL
MICROSCOPIC-UA: NORMAL
MONOCYTES # BLD AUTO: 2.18 K/UL
MONOCYTES NFR BLD AUTO: 4 %
NEUTROPHILS # BLD AUTO: 6 K/UL
NEUTROPHILS NFR BLD AUTO: 11 %
NITRITE URINE: NEGATIVE
NONHDLC SERPL-MCNC: 114 MG/DL
PH URINE: 7
PLATELET # BLD AUTO: 170 K/UL
POTASSIUM SERPL-SCNC: 4.6 MMOL/L
PROT SERPL-MCNC: 6.5 G/DL
PROTEIN URINE: ABNORMAL
PSA FREE FLD-MCNC: 15 %
PSA FREE SERPL-MCNC: 0.45 NG/ML
PSA SERPL-MCNC: 3.03 NG/ML
RBC # BLD: 4.41 M/UL
RBC # FLD: 13.4 %
RED BLOOD CELLS URINE: 3 /HPF
SODIUM SERPL-SCNC: 141 MMOL/L
SPECIFIC GRAVITY URINE: 1.02
SQUAMOUS EPITHELIAL CELLS: 3 /HPF
TRIGL SERPL-MCNC: 171 MG/DL
TSH SERPL-ACNC: 7.03 UIU/ML
UROBILINOGEN URINE: NORMAL
VIT B12 SERPL-MCNC: 444 PG/ML
WBC # FLD AUTO: 54.57 K/UL
WHITE BLOOD CELLS URINE: 2 /HPF

## 2021-08-23 PROCEDURE — G0439: CPT

## 2021-08-23 PROCEDURE — 93000 ELECTROCARDIOGRAM COMPLETE: CPT

## 2021-08-23 PROCEDURE — G0447 BEHAVIOR COUNSEL OBESITY 15M: CPT | Mod: 59

## 2021-09-13 ENCOUNTER — APPOINTMENT (OUTPATIENT)
Dept: FAMILY MEDICINE | Facility: CLINIC | Age: 76
End: 2021-09-13

## 2021-10-07 ENCOUNTER — APPOINTMENT (OUTPATIENT)
Dept: NEPHROLOGY | Facility: CLINIC | Age: 76
End: 2021-10-07
Payer: MEDICARE

## 2021-10-07 LAB — TSH SERPL-ACNC: 5.9 UIU/ML

## 2021-10-07 PROCEDURE — 99443: CPT | Mod: 95

## 2021-10-07 NOTE — ASSESSMENT
[FreeTextEntry1] : 77 yo male with sub-nephrotic proteinuria, HTN, CLL, COVID, CKD 3B\par Proteinuria : secondary to hyperfiltration of being overweight.  Repeat albumin to creatinine ratio in the next couple months.  Will send prescription.\par Monitor and trend proteinuria which appears to be better.\par Modest protein intake\par CKD 3B versus prerenal: Elevated creatinine 1.5 which is improved from his 2.0 during COVID infection.  Increase fluid hydration and repeat renal panel.\par Following urology for BPH\par HTN: Appears recently controlled. \par Wt loss advised\par Avoid any chronic NSAID use \par Parkinson's: Follow with neurology and physical therapy\par All questions were answered\par Labs in 1 to 2 months\par Follow-up in 6 months.

## 2021-10-07 NOTE — HISTORY OF PRESENT ILLNESS
[Home] : at home, [unfilled] , at the time of the visit. [Medical Office: ([facility])___] : at the medical office located in  [Spouse] : spouse [Verbal consent obtained from patient] : the patient, [unfilled] [FreeTextEntry1] : 75 yo male with history of HTN, CLL, on a telehealth visit for follow-up for proteinuria. \par Had COVID -April 2020 \par He follows with Dr. Mu Sparks for CLL and follows with him.\par He was diagnosed with early parkinson's by Dr Eddy and still on sinemet.  Has physical therapy 6 months out of the year however has not had much motivation to do it on his own.\par Labs from August showed creatinine of 1.58.  Patient does not like drinking fluids because of his BPH makes him go to the bathroom often.\par Weight is 240 pounds\par Meds were reviewed.

## 2021-10-28 ENCOUNTER — LABORATORY RESULT (OUTPATIENT)
Age: 76
End: 2021-10-28

## 2021-11-01 ENCOUNTER — NON-APPOINTMENT (OUTPATIENT)
Age: 76
End: 2021-11-01

## 2022-02-10 ENCOUNTER — NON-APPOINTMENT (OUTPATIENT)
Age: 77
End: 2022-02-10

## 2022-02-10 ENCOUNTER — APPOINTMENT (OUTPATIENT)
Dept: FAMILY MEDICINE | Facility: CLINIC | Age: 77
End: 2022-02-10
Payer: MEDICARE

## 2022-02-10 VITALS
OXYGEN SATURATION: 95 % | WEIGHT: 238 LBS | SYSTOLIC BLOOD PRESSURE: 128 MMHG | DIASTOLIC BLOOD PRESSURE: 70 MMHG | HEART RATE: 77 BPM | HEIGHT: 70 IN | TEMPERATURE: 97.5 F | BODY MASS INDEX: 34.07 KG/M2 | RESPIRATION RATE: 16 BRPM

## 2022-02-10 DIAGNOSIS — H26.9 UNSPECIFIED CATARACT: ICD-10-CM

## 2022-02-10 PROCEDURE — 93000 ELECTROCARDIOGRAM COMPLETE: CPT

## 2022-02-10 PROCEDURE — 99214 OFFICE O/P EST MOD 30 MIN: CPT | Mod: 25

## 2022-02-11 PROBLEM — H26.9 CATARACT, LEFT EYE: Status: ACTIVE | Noted: 2022-02-11

## 2022-02-11 RX ORDER — CIPROFLOXACIN HYDROCHLORIDE 500 MG/1
500 TABLET, FILM COATED ORAL TWICE DAILY
Qty: 14 | Refills: 0 | Status: DISCONTINUED | COMMUNITY
Start: 2018-03-22 | End: 2022-02-11

## 2022-02-11 RX ORDER — LEVOTHYROXINE SODIUM 0.07 MG/1
75 TABLET ORAL
Qty: 30 | Refills: 0 | Status: DISCONTINUED | COMMUNITY
Start: 2021-09-05

## 2022-02-11 RX ORDER — MECLIZINE HYDROCHLORIDE 12.5 MG/1
12.5 TABLET ORAL
Qty: 30 | Refills: 0 | Status: DISCONTINUED | COMMUNITY
Start: 2021-09-10

## 2022-02-11 NOTE — ASSESSMENT
[Patient Optimized for Surgery] : Patient optimized for surgery [No Further Testing Recommended] : no further testing recommended [Continue anti-platelet treatment as is] : Continue current anti-platelet treatment [As per surgery] : as per surgery [FreeTextEntry6] : stop ASA week prior to surgery

## 2022-02-11 NOTE — HISTORY OF PRESENT ILLNESS
[No Pertinent Cardiac History] : no history of aortic stenosis, atrial fibrillation, coronary artery disease, recent myocardial infarction, or implantable device/pacemaker [No Pertinent Pulmonary History] : no history of asthma, COPD, sleep apnea, or smoking [No Adverse Anesthesia Reaction] : no adverse anesthesia reaction in self or family member [Good (7-10 METs)] : Good (7-10 METs) [Anti-Platelet Agents: _____] : Anti-Platelet Agents: [unfilled] [Chronic Kidney Disease] : chronic kidney disease [(Patient denies any chest pain, claudication, dyspnea on exertion, orthopnea, palpitations or syncope)] : Patient denies any chest pain, claudication, dyspnea on exertion, orthopnea, palpitations or syncope [Chronic Anticoagulation] : no chronic anticoagulation [Diabetes] : no diabetes [FreeTextEntry1] : left eye cataract surgery  [FreeTextEntry2] : 2/24/2022 [FreeTextEntry3] : Dr. Deepak Bearden  [FreeTextEntry4] : overall is feeling well \par denies any other complaints or concerns at this time  [FreeTextEntry5] : former smoker 3 PPD x 5-10; last 30 years ago; CKD stage 3

## 2022-02-23 ENCOUNTER — RX RENEWAL (OUTPATIENT)
Age: 77
End: 2022-02-23

## 2022-03-08 ENCOUNTER — NON-APPOINTMENT (OUTPATIENT)
Age: 77
End: 2022-03-08

## 2022-03-30 ENCOUNTER — RX RENEWAL (OUTPATIENT)
Age: 77
End: 2022-03-30

## 2022-05-26 ENCOUNTER — APPOINTMENT (OUTPATIENT)
Dept: UROLOGY | Facility: CLINIC | Age: 77
End: 2022-05-26
Payer: MEDICARE

## 2022-05-26 PROCEDURE — 99214 OFFICE O/P EST MOD 30 MIN: CPT

## 2022-05-26 NOTE — ASSESSMENT
[FreeTextEntry1] : We will increase tamsulosin to 0.8 mg qhs\par take at bedtime\par counseled re falls\par \par \par Hx of PD, erections intact\par VUDS and cysto\par \par Reviewed outlet procedures, office based vs OR TURP\par he is more interested in turp\par reviewed risks including ED, stricture, incontinence, hematuria\par Will do UDS first\par \par

## 2022-05-26 NOTE — PHYSICAL EXAM
[General Appearance - Well Developed] : well developed [General Appearance - Well Nourished] : well nourished [Normal Appearance] : normal appearance [Well Groomed] : well groomed [General Appearance - In No Acute Distress] : no acute distress [Abdomen Tenderness] : non-tender [] : no respiratory distress [Respiration, Rhythm And Depth] : normal respiratory rhythm and effort [Exaggerated Use Of Accessory Muscles For Inspiration] : no accessory muscle use [Affect] : the affect was normal [Mood] : the mood was normal [Not Anxious] : not anxious [FreeTextEntry1] : Slow gait

## 2022-05-26 NOTE — HISTORY OF PRESENT ILLNESS
[FreeTextEntry1] : 76 year old M w hx of PD and trilobar BPH with OAB/nocturia/LUTS.  He has been on Tamsulosin 0.4 and finasteride for >1 year.\par \par DTF  1-2 hours\par nocturia3\par UUI not daily \par CHASTITY denies\par \par drinks coffee, but not every day\par \par Bowel movements daily, at times constipation\par \par He does have erections still \par \par \par \par Cystoscopy  (2020) - trilobar enlargement\par \par Renal u/s (3/2021) \par prostate 59g\par \par no hydro, no stones

## 2022-06-08 ENCOUNTER — RX RENEWAL (OUTPATIENT)
Age: 77
End: 2022-06-08

## 2022-06-30 ENCOUNTER — OUTPATIENT (OUTPATIENT)
Dept: OUTPATIENT SERVICES | Facility: HOSPITAL | Age: 77
LOS: 1 days | End: 2022-06-30
Payer: MEDICARE

## 2022-06-30 ENCOUNTER — APPOINTMENT (OUTPATIENT)
Dept: UROLOGY | Facility: CLINIC | Age: 77
End: 2022-06-30

## 2022-06-30 VITALS
DIASTOLIC BLOOD PRESSURE: 89 MMHG | SYSTOLIC BLOOD PRESSURE: 152 MMHG | HEART RATE: 75 BPM | TEMPERATURE: 96.8 F | OXYGEN SATURATION: 95 %

## 2022-06-30 DIAGNOSIS — R35.0 FREQUENCY OF MICTURITION: ICD-10-CM

## 2022-06-30 DIAGNOSIS — B35.6 TINEA CRURIS: ICD-10-CM

## 2022-06-30 PROCEDURE — 51741 ELECTRO-UROFLOWMETRY FIRST: CPT | Mod: 26

## 2022-06-30 PROCEDURE — 51784 ANAL/URINARY MUSCLE STUDY: CPT

## 2022-06-30 PROCEDURE — 51728 CYSTOMETROGRAM W/VP: CPT | Mod: 26

## 2022-06-30 PROCEDURE — 76000 FLUOROSCOPY <1 HR PHYS/QHP: CPT | Mod: 26,59

## 2022-06-30 PROCEDURE — 51797 INTRAABDOMINAL PRESSURE TEST: CPT | Mod: 26

## 2022-06-30 PROCEDURE — 52000 CYSTOURETHROSCOPY: CPT

## 2022-06-30 PROCEDURE — 76000 FLUOROSCOPY <1 HR PHYS/QHP: CPT | Mod: 59

## 2022-06-30 PROCEDURE — 51728 CYSTOMETROGRAM W/VP: CPT

## 2022-06-30 PROCEDURE — 51600 INJECTION FOR BLADDER X-RAY: CPT

## 2022-06-30 PROCEDURE — 51741 ELECTRO-UROFLOWMETRY FIRST: CPT

## 2022-06-30 PROCEDURE — 51797 INTRAABDOMINAL PRESSURE TEST: CPT

## 2022-06-30 PROCEDURE — 51784 ANAL/URINARY MUSCLE STUDY: CPT | Mod: 26

## 2022-07-11 ENCOUNTER — APPOINTMENT (OUTPATIENT)
Dept: UROLOGY | Facility: CLINIC | Age: 77
End: 2022-07-11

## 2022-07-11 VITALS — HEART RATE: 58 BPM | SYSTOLIC BLOOD PRESSURE: 162 MMHG | DIASTOLIC BLOOD PRESSURE: 81 MMHG

## 2022-07-11 DIAGNOSIS — N32.81 OVERACTIVE BLADDER: ICD-10-CM

## 2022-07-11 DIAGNOSIS — N39.41 URGE INCONTINENCE: ICD-10-CM

## 2022-07-11 PROCEDURE — 99215 OFFICE O/P EST HI 40 MIN: CPT

## 2022-07-11 NOTE — ASSESSMENT
[FreeTextEntry1] : DO - will initiate oab meds after surgery\par \par discussed outlet procedure vs bladder \par expected outcomes and likelihood that bladder will still be overactive after procedure\par \par We discussed the r/b/a of Transurethral Resection of the Prostate\par He understands that the procedure should last around 5-8 years before the prostatic tissue can start to regrow.\par He understands risks include urinary incontinence, bleeding, infection, erectile dysfunction, stricture, and retrograde ejaculation.\par He understands his postoperative course would require a urinary catheter for at least 24 hours, a possible overnight hospital stay if he has hematuria, and he may experience urinary urgency or frequency with bladder remodeling.\par \par cont tamsulosin 0.8 and finasteride 5\par \par Counseled the patient on constipation and how it can affect the urinary tract. We discussed increasing water intake, daily fruits and vegetables, and sources of fiber.  We recommended 4 servings of whole fruit per day, excluding dried fruit or juices.  We also recommended supplementing soluble fiber intake with gummy fiber #2/day.\par

## 2022-07-11 NOTE — PHYSICAL EXAM
[General Appearance - Well Developed] : well developed [General Appearance - Well Nourished] : well nourished [Normal Appearance] : normal appearance [Well Groomed] : well groomed [General Appearance - In No Acute Distress] : no acute distress [Abdomen Tenderness] : non-tender [No Prostate Nodules] : no prostate nodules [] : no respiratory distress [Respiration, Rhythm And Depth] : normal respiratory rhythm and effort [Exaggerated Use Of Accessory Muscles For Inspiration] : no accessory muscle use [Affect] : the affect was normal [Mood] : the mood was normal [Not Anxious] : not anxious

## 2022-07-11 NOTE — HISTORY OF PRESENT ILLNESS
[FreeTextEntry1] : 76 year old M w hx of PD and trilobar BPH with OAB/nocturia/LUTS. He has been on Tamsulosin 0.8 mg and finasteride for >1 year.\par \par Bowel movements daily, at times constipation\par \par He does have erections still \par \par \par \par Cystoscopy (2020) - trilobar enlargement\par no hydro, no stones \par \par Renal u/s (3/2021) \par prostate 59g\par \par \par \par UDS / cysto (6/2022)\par FILLING\par capacity 349\par do with leak\par no mirna \par compliance acceptable\par NO VUR\par \par EMPTYING\par emg silenced\par flow 8.4 - flattened flow \par pdet 51\par pvr 21\par \par cysto trilobar obstruction

## 2022-07-18 DIAGNOSIS — N40.1 BENIGN PROSTATIC HYPERPLASIA WITH LOWER URINARY TRACT SYMPTOMS: ICD-10-CM

## 2022-07-18 DIAGNOSIS — B35.6 TINEA CRURIS: ICD-10-CM

## 2022-07-18 DIAGNOSIS — G20 PARKINSON'S DISEASE: ICD-10-CM

## 2022-07-18 DIAGNOSIS — N39.41 URGE INCONTINENCE: ICD-10-CM

## 2022-07-29 NOTE — HISTORY OF PRESENT ILLNESS
[Home] : at home, [unfilled] , at the time of the visit. [Medical Office: ([facility])___] : at the medical office located in  0 [Patient] : the patient [Self] : self [FreeTextEntry4] : Grace Isaac [de-identified] : 73 y/o M (CLL,HTN) presents via TeleHealth accompanied by his wife Eula,Pt tested + for Covid  3/27. Had temps and fatigue. Denies SOB or cough.States he has PT at home twice weekly which has been helpful as he is still fatigued and weak. Uses walker when he goes outside but inhouse no walker needed. Lost 10 pounds now weighing 232 as for the first few days of Covid had diarrhea and decreased appetite.

## 2022-08-15 ENCOUNTER — RX RENEWAL (OUTPATIENT)
Age: 77
End: 2022-08-15

## 2022-08-23 ENCOUNTER — APPOINTMENT (OUTPATIENT)
Dept: UROLOGY | Facility: CLINIC | Age: 77
End: 2022-08-23

## 2022-08-23 VITALS
SYSTOLIC BLOOD PRESSURE: 149 MMHG | TEMPERATURE: 98.3 F | WEIGHT: 250 LBS | HEIGHT: 70 IN | HEART RATE: 60 BPM | RESPIRATION RATE: 17 BRPM | DIASTOLIC BLOOD PRESSURE: 81 MMHG | BODY MASS INDEX: 35.79 KG/M2

## 2022-08-23 PROCEDURE — 99213 OFFICE O/P EST LOW 20 MIN: CPT

## 2022-08-23 NOTE — PHYSICAL EXAM
[General Appearance - Well Developed] : well developed [General Appearance - Well Nourished] : well nourished [Normal Appearance] : normal appearance [Well Groomed] : well groomed [General Appearance - In No Acute Distress] : no acute distress [Abdomen Soft] : soft [Abdomen Tenderness] : non-tender [Costovertebral Angle Tenderness] : no ~M costovertebral angle tenderness [Edema] : no peripheral edema [] : no respiratory distress [Respiration, Rhythm And Depth] : normal respiratory rhythm and effort [Exaggerated Use Of Accessory Muscles For Inspiration] : no accessory muscle use [Oriented To Time, Place, And Person] : oriented to person, place, and time [Affect] : the affect was normal [Mood] : the mood was normal [Not Anxious] : not anxious [Normal Station and Gait] : the gait and station were normal for the patient's age [No Focal Deficits] : no focal deficits [No Palpable Adenopathy] : no palpable adenopathy [FreeTextEntry1] : deferred for prior exams

## 2022-08-23 NOTE — ASSESSMENT
[FreeTextEntry1] : 76M with BPH causing elevated PVRs and urinary frequency, not significantly improved on dual medical therapy.\par \par - Discussed outlet procedures in general\par - TURP, Greenlight, Aquablation - risks and benefits discussed at length\par -  Patient and wife to discuss treatment options and call for surgical scheduling if they want to proceed

## 2022-08-23 NOTE — HISTORY OF PRESENT ILLNESS
[FreeTextEntry1] : 76M with BPH, referred for aquablation \par Flomax 0.8mg and finasteride for >1 year \par 60cc prostate (2021) \par  (2021) \par Voiding q2-3 hr, some urgency, nocturia x3-4/night, bothersome\par UDS with Dr Bower:\par \par \par PMH: parkinsons, HTN, HLD, ? gout\par PSH: cataracts, thyroidectomy\par Meds:  synthroid, statin, allopurinol, metoprolol, ASA 81, crabidopa-levodopa, flomax/finasteride

## 2022-09-06 ENCOUNTER — APPOINTMENT (OUTPATIENT)
Dept: UROLOGY | Facility: HOSPITAL | Age: 77
End: 2022-09-06

## 2022-09-22 ENCOUNTER — RX RENEWAL (OUTPATIENT)
Age: 77
End: 2022-09-22

## 2022-10-03 ENCOUNTER — APPOINTMENT (OUTPATIENT)
Dept: FAMILY MEDICINE | Facility: CLINIC | Age: 77
End: 2022-10-03

## 2022-10-03 PROCEDURE — 99215 OFFICE O/P EST HI 40 MIN: CPT

## 2022-10-06 ENCOUNTER — OUTPATIENT (OUTPATIENT)
Dept: OUTPATIENT SERVICES | Facility: HOSPITAL | Age: 77
LOS: 1 days | End: 2022-10-06
Payer: MEDICARE

## 2022-10-06 VITALS
SYSTOLIC BLOOD PRESSURE: 127 MMHG | OXYGEN SATURATION: 98 % | RESPIRATION RATE: 14 BRPM | HEIGHT: 68 IN | WEIGHT: 240.3 LBS | HEART RATE: 68 BPM | TEMPERATURE: 98 F | DIASTOLIC BLOOD PRESSURE: 79 MMHG

## 2022-10-06 DIAGNOSIS — N40.1 BENIGN PROSTATIC HYPERPLASIA WITH LOWER URINARY TRACT SYMPTOMS: ICD-10-CM

## 2022-10-06 DIAGNOSIS — Z98.52 VASECTOMY STATUS: Chronic | ICD-10-CM

## 2022-10-06 DIAGNOSIS — Z01.818 ENCOUNTER FOR OTHER PREPROCEDURAL EXAMINATION: ICD-10-CM

## 2022-10-06 DIAGNOSIS — E89.0 POSTPROCEDURAL HYPOTHYROIDISM: Chronic | ICD-10-CM

## 2022-10-06 DIAGNOSIS — Z98.890 OTHER SPECIFIED POSTPROCEDURAL STATES: Chronic | ICD-10-CM

## 2022-10-06 LAB
ANION GAP SERPL CALC-SCNC: 9 MMOL/L — SIGNIFICANT CHANGE UP (ref 5–17)
APPEARANCE UR: CLEAR — SIGNIFICANT CHANGE UP
BACTERIA # UR AUTO: NEGATIVE /HPF — SIGNIFICANT CHANGE UP
BILIRUB UR-MCNC: NEGATIVE — SIGNIFICANT CHANGE UP
BUN SERPL-MCNC: 25 MG/DL — HIGH (ref 7–23)
CALCIUM SERPL-MCNC: 9.3 MG/DL — SIGNIFICANT CHANGE UP (ref 8.4–10.5)
CHLORIDE SERPL-SCNC: 110 MMOL/L — HIGH (ref 96–108)
CO2 SERPL-SCNC: 25 MMOL/L — SIGNIFICANT CHANGE UP (ref 22–31)
COLOR SPEC: YELLOW — SIGNIFICANT CHANGE UP
CREAT SERPL-MCNC: 1.58 MG/DL — HIGH (ref 0.5–1.3)
DIFF PNL FLD: ABNORMAL
EGFR: 45 ML/MIN/1.73M2 — LOW
EPI CELLS # UR: SIGNIFICANT CHANGE UP
GLUCOSE SERPL-MCNC: 125 MG/DL — HIGH (ref 70–99)
GLUCOSE UR QL: NEGATIVE — SIGNIFICANT CHANGE UP
HCT VFR BLD CALC: 43.4 % — SIGNIFICANT CHANGE UP (ref 39–50)
HGB BLD-MCNC: 13.6 G/DL — SIGNIFICANT CHANGE UP (ref 13–17)
KETONES UR-MCNC: NEGATIVE — SIGNIFICANT CHANGE UP
LEUKOCYTE ESTERASE UR-ACNC: NEGATIVE — SIGNIFICANT CHANGE UP
MCHC RBC-ENTMCNC: 31.3 GM/DL — LOW (ref 32–36)
MCHC RBC-ENTMCNC: 32.4 PG — SIGNIFICANT CHANGE UP (ref 27–34)
MCV RBC AUTO: 103.3 FL — HIGH (ref 80–100)
NITRITE UR-MCNC: NEGATIVE — SIGNIFICANT CHANGE UP
NRBC # BLD: 0 /100 WBCS — SIGNIFICANT CHANGE UP (ref 0–0)
PH UR: 6 — SIGNIFICANT CHANGE UP (ref 5–8)
PLATELET # BLD AUTO: 148 K/UL — LOW (ref 150–400)
POTASSIUM SERPL-MCNC: 4.6 MMOL/L — SIGNIFICANT CHANGE UP (ref 3.5–5.3)
POTASSIUM SERPL-SCNC: 4.6 MMOL/L — SIGNIFICANT CHANGE UP (ref 3.5–5.3)
PROT UR-MCNC: 100
RBC # BLD: 4.2 M/UL — SIGNIFICANT CHANGE UP (ref 4.2–5.8)
RBC # FLD: 14.4 % — SIGNIFICANT CHANGE UP (ref 10.3–14.5)
RBC CASTS # UR COMP ASSIST: SIGNIFICANT CHANGE UP /HPF (ref 0–4)
SODIUM SERPL-SCNC: 144 MMOL/L — SIGNIFICANT CHANGE UP (ref 135–145)
SP GR SPEC: 1.01 — SIGNIFICANT CHANGE UP (ref 1.01–1.02)
UROBILINOGEN FLD QL: NEGATIVE — SIGNIFICANT CHANGE UP
WBC # BLD: 49.97 K/UL — CRITICAL HIGH (ref 3.8–10.5)
WBC # FLD AUTO: 49.97 K/UL — CRITICAL HIGH (ref 3.8–10.5)
WBC UR QL: SIGNIFICANT CHANGE UP /HPF (ref 0–5)

## 2022-10-06 PROCEDURE — 93010 ELECTROCARDIOGRAM REPORT: CPT | Mod: NC

## 2022-10-06 PROCEDURE — 36415 COLL VENOUS BLD VENIPUNCTURE: CPT

## 2022-10-06 PROCEDURE — 85027 COMPLETE CBC AUTOMATED: CPT

## 2022-10-06 PROCEDURE — 80048 BASIC METABOLIC PNL TOTAL CA: CPT

## 2022-10-06 PROCEDURE — 93005 ELECTROCARDIOGRAM TRACING: CPT

## 2022-10-06 PROCEDURE — 81001 URINALYSIS AUTO W/SCOPE: CPT

## 2022-10-06 PROCEDURE — G0463: CPT

## 2022-10-06 PROCEDURE — 87086 URINE CULTURE/COLONY COUNT: CPT

## 2022-10-06 RX ORDER — TAMSULOSIN HYDROCHLORIDE 0.4 MG/1
2 CAPSULE ORAL
Qty: 0 | Refills: 0 | DISCHARGE

## 2022-10-06 RX ORDER — CARBIDOPA AND LEVODOPA 25; 100 MG/1; MG/1
1 TABLET ORAL
Qty: 0 | Refills: 0 | DISCHARGE

## 2022-10-06 NOTE — H&P PST ADULT - NSICDXPASTMEDICALHX_GEN_ALL_CORE_FT
PAST MEDICAL HISTORY:  Basal cell carcinoma head    BPH (Benign Prostatic Hyperplasia)     CLL (chronic lymphocytic leukemia)     COVID-19 vaccine series completed     Dry eyes     Enlarged prostate with lower urinary tract symptoms (LUTS)     GERD (gastroesophageal reflux disease)     Glaucoma no eye drops    Gout no attack > 5 years    History of 2019 novel coronavirus disease (COVID-19) April 2020, fatigue, fever and confusion. no hospitalization    HLD (hyperlipidemia)     HTN (hypertension)     Parkinson's disease     Proteinuria

## 2022-10-06 NOTE — H&P PST ADULT - NSICDXPASTSURGICALHX_GEN_ALL_CORE_FT
PAST SURGICAL HISTORY:  History of basal cell carcinoma excision 2021    History of partial thyroidectomy 2012, benign nodule    History of vasectomy age 47

## 2022-10-06 NOTE — H&P PST ADULT - NSANTHOSAYNRD_GEN_A_CORE
neck 16.5 inches/No. TRISHA screening performed.  STOP BANG Legend: 0-2 = LOW Risk; 3-4 = INTERMEDIATE Risk; 5-8 = HIGH Risk

## 2022-10-06 NOTE — H&P PST ADULT - FALL HARM RISK - RISK INTERVENTIONS

## 2022-10-06 NOTE — H&P PST ADULT - PROBLEM SELECTOR PLAN 1
cystoscopy, turp using aquablation. medical clearance requested. instructed to take levothyroxine, allopurinol, metoprolol, irbesartan, finasteride and carbidopa-levodopa AM of surgery with sips of water. covid PCR appt. confirmed for 10/17/22 at [address] [place] [address]

## 2022-10-06 NOTE — H&P PST ADULT - MUSCULOSKELETAL
ROM intact/no joint swelling/no joint erythema/no joint warmth/no calf tenderness/abnormal gait negative

## 2022-10-07 LAB
CULTURE RESULTS: SIGNIFICANT CHANGE UP
SPECIMEN SOURCE: SIGNIFICANT CHANGE UP

## 2022-10-14 PROBLEM — K21.9 GASTRO-ESOPHAGEAL REFLUX DISEASE WITHOUT ESOPHAGITIS: Chronic | Status: ACTIVE | Noted: 2022-10-06

## 2022-10-14 PROBLEM — H40.9 UNSPECIFIED GLAUCOMA: Chronic | Status: ACTIVE | Noted: 2022-10-06

## 2022-10-14 PROBLEM — Z86.16 PERSONAL HISTORY OF COVID-19: Chronic | Status: ACTIVE | Noted: 2022-10-06

## 2022-10-14 PROBLEM — C91.10 CHRONIC LYMPHOCYTIC LEUKEMIA OF B-CELL TYPE NOT HAVING ACHIEVED REMISSION: Chronic | Status: ACTIVE | Noted: 2022-10-06

## 2022-10-14 PROBLEM — N40.1 BENIGN PROSTATIC HYPERPLASIA WITH LOWER URINARY TRACT SYMPTOMS: Chronic | Status: ACTIVE | Noted: 2022-10-06

## 2022-10-17 VITALS
HEART RATE: 62 BPM | OXYGEN SATURATION: 96 % | TEMPERATURE: 97.9 F | DIASTOLIC BLOOD PRESSURE: 62 MMHG | BODY MASS INDEX: 35.79 KG/M2 | WEIGHT: 250 LBS | RESPIRATION RATE: 16 BRPM | HEIGHT: 70 IN | SYSTOLIC BLOOD PRESSURE: 118 MMHG

## 2022-10-17 NOTE — RESULTS/DATA
[] : results reviewed [de-identified] : patient has CLL, WBC elevated [de-identified] : CKD [de-identified] : no acute changes [de-identified] : ua, culture negative

## 2022-10-17 NOTE — REVIEW OF SYSTEMS
Yes [Hesitancy] : hesitancy [Nocturia] : nocturia [Frequency] : frequency [Joint Pain] : joint pain [Joint Stiffness] : joint stiffness [Muscle Weakness] : muscle weakness [Back Pain] : back pain [Unsteady Walk] : ataxia [Negative] : Heme/Lymph [Dysuria] : no dysuria [Incontinence] : no incontinence [Hematuria] : no hematuria [Muscle Pain] : no muscle pain [Joint Swelling] : no joint swelling [Headache] : no headache [Dizziness] : no dizziness [Fainting] : no fainting [FreeTextEntry9] : chronic [de-identified] : ambulating with cane assistance, baseline neuro for patient

## 2022-10-17 NOTE — END OF VISIT
[FreeTextEntry3] : I, Dr. Berry, personally performed the evaluation and management (E/M) services for this established patient who presents today with (a) new problem(s)/exacerbation of (an) existing condition(s). That E/M includes conducting the examination, assessing all new/exacerbated conditions, and establishing a new plan of care. Today, my nurse practitioner, Luba Perry, was here to observe my evaluation and management services fort his new problem/exacerbated condition to be follow going forward.\par

## 2022-10-17 NOTE — HISTORY OF PRESENT ILLNESS
[(Patient denies any chest pain, claudication, dyspnea on exertion, orthopnea, palpitations or syncope)] : Patient denies any chest pain, claudication, dyspnea on exertion, orthopnea, palpitations or syncope [No Pertinent Pulmonary History] : no history of asthma, COPD, sleep apnea, or smoking [No Adverse Anesthesia Reaction] : no adverse anesthesia reaction in self or family member [Chronic Anticoagulation] : chronic anticoagulation [Chronic Kidney Disease] : chronic kidney disease [Diabetes] : no diabetes [FreeTextEntry1] : aquablation of prostate [FreeTextEntry3] : MD Ibrahima Hernandez [FreeTextEntry2] : 10/19/22 [FreeTextEntry4] : 78 yo male with PMHx CLL, hypothyroid (levothyroxine) HTN (irbesartan, metoprolol) parkinson's diease (carbidopa/levodopa) gout (allopurinol) HLD (simvastatin) BPH (tmsulosin) presents to the office for pre operative clearance for aquablation of prostate.  [FreeTextEntry7] : EKG done with PST

## 2022-10-17 NOTE — COUNSELING
[Fall prevention counseling provided] : fall prevention  [Adequate lighting] : Adequate lighting [No throw rugs] : No throw rugs [Use proper foot wear] : Use proper foot wear [Use recommended devices] : Use recommended devices

## 2022-10-17 NOTE — ASSESSMENT
[Patient Optimized for Surgery] : Patient optimized for surgery [Other: _____] : [unfilled] [Modify anticoagulant treatment prior to procedure] : Modify anticoagulant treatment prior to procedure [FreeTextEntry5] : stop asa 81mg one week prior to surgery [FreeTextEntry7] : Patient advised to d/c any OTC medications/supplements one week prior to surgery.

## 2022-10-17 NOTE — PHYSICAL EXAM
[Normal Rate] : normal rate  [Regular Rhythm] : with a regular rhythm [Normal S1, S2] : normal S1 and S2 [No Carotid Bruits] : no carotid bruits [No Abdominal Bruit] : a ~M bruit was not heard ~T in the abdomen [Pedal Pulses Present] : the pedal pulses are present [No Palpable Aorta] : no palpable aorta [No Extremity Clubbing/Cyanosis] : no extremity clubbing/cyanosis [Kyphosis] : kyphosis [No Joint Swelling] : no joint swelling [Coordination Grossly Intact] : coordination grossly intact [Speech Grossly Normal] : speech grossly normal [Normal Affect] : the affect was normal [Normal Mood] : the mood was normal [Normal Insight/Judgement] : insight and judgment were intact [Normal] : affect was normal and insight and judgment were intact [de-identified] : baseline for patient, ambulating with cane assistance [de-identified] : baseline neuro for patient, ambulating with cane assistance

## 2022-10-18 ENCOUNTER — TRANSCRIPTION ENCOUNTER (OUTPATIENT)
Age: 77
End: 2022-10-18

## 2022-10-18 LAB — SARS-COV-2 N GENE NPH QL NAA+PROBE: NOT DETECTED

## 2022-10-19 ENCOUNTER — OUTPATIENT (OUTPATIENT)
Dept: OUTPATIENT SERVICES | Facility: HOSPITAL | Age: 77
LOS: 1 days | End: 2022-10-19
Payer: MEDICARE

## 2022-10-19 ENCOUNTER — APPOINTMENT (OUTPATIENT)
Dept: UROLOGY | Facility: HOSPITAL | Age: 77
End: 2022-10-19

## 2022-10-19 ENCOUNTER — RESULT REVIEW (OUTPATIENT)
Age: 77
End: 2022-10-19

## 2022-10-19 DIAGNOSIS — Z98.52 VASECTOMY STATUS: Chronic | ICD-10-CM

## 2022-10-19 DIAGNOSIS — Z98.890 OTHER SPECIFIED POSTPROCEDURAL STATES: Chronic | ICD-10-CM

## 2022-10-19 DIAGNOSIS — E89.0 POSTPROCEDURAL HYPOTHYROIDISM: Chronic | ICD-10-CM

## 2022-10-19 PROCEDURE — 88305 TISSUE EXAM BY PATHOLOGIST: CPT | Mod: 26

## 2022-10-20 ENCOUNTER — TRANSCRIPTION ENCOUNTER (OUTPATIENT)
Age: 77
End: 2022-10-20

## 2022-10-20 PROCEDURE — 88305 TISSUE EXAM BY PATHOLOGIST: CPT

## 2022-10-20 PROCEDURE — 52601 PROSTATECTOMY (TURP): CPT

## 2022-10-20 PROCEDURE — C9399: CPT

## 2022-10-20 RX ORDER — METOPROLOL TARTRATE 50 MG
1 TABLET ORAL
Qty: 0 | Refills: 0 | DISCHARGE

## 2022-10-20 RX ORDER — ALLOPURINOL 300 MG
1 TABLET ORAL
Qty: 0 | Refills: 0 | DISCHARGE

## 2022-10-20 RX ORDER — CARBIDOPA AND LEVODOPA 25; 100 MG/1; MG/1
1 TABLET ORAL
Qty: 0 | Refills: 0 | DISCHARGE

## 2022-10-20 RX ORDER — SIMVASTATIN 20 MG/1
1 TABLET, FILM COATED ORAL
Qty: 0 | Refills: 0 | DISCHARGE

## 2022-10-20 RX ORDER — TAMSULOSIN HYDROCHLORIDE 0.4 MG/1
2 CAPSULE ORAL
Qty: 0 | Refills: 0 | DISCHARGE

## 2022-10-20 RX ORDER — LEVOTHYROXINE SODIUM 125 MCG
1 TABLET ORAL
Qty: 0 | Refills: 0 | DISCHARGE

## 2022-10-20 RX ORDER — CALCIUM CARBONATE 500(1250)
1 TABLET ORAL
Qty: 0 | Refills: 0 | DISCHARGE

## 2022-10-20 RX ORDER — FINASTERIDE 5 MG/1
1 TABLET, FILM COATED ORAL
Qty: 0 | Refills: 0 | DISCHARGE

## 2022-10-24 DIAGNOSIS — N40.1 BENIGN PROSTATIC HYPERPLASIA WITH LOWER URINARY TRACT SYMPTOMS: ICD-10-CM

## 2022-10-26 LAB
APPEARANCE: ABNORMAL
BACTERIA UR CULT: NORMAL
BACTERIA: NEGATIVE
BILIRUBIN URINE: ABNORMAL
BLOOD URINE: ABNORMAL
COLOR: ABNORMAL
GLUCOSE QUALITATIVE U: NEGATIVE
HYALINE CASTS: 0 /LPF
KETONES URINE: NORMAL
LEUKOCYTE ESTERASE URINE: ABNORMAL
MICROSCOPIC-UA: NORMAL
NITRITE URINE: POSITIVE
PH URINE: 6.5
PROTEIN URINE: ABNORMAL
RED BLOOD CELLS URINE: >720 /HPF
SPECIFIC GRAVITY URINE: 1.02
SQUAMOUS EPITHELIAL CELLS: 1 /HPF
UROBILINOGEN URINE: NORMAL
WHITE BLOOD CELLS URINE: 4 /HPF

## 2022-11-03 ENCOUNTER — NON-APPOINTMENT (OUTPATIENT)
Age: 77
End: 2022-11-03

## 2022-11-30 ENCOUNTER — APPOINTMENT (OUTPATIENT)
Dept: UROLOGY | Facility: CLINIC | Age: 77
End: 2022-11-30

## 2022-11-30 VITALS
HEIGHT: 70 IN | SYSTOLIC BLOOD PRESSURE: 129 MMHG | WEIGHT: 250 LBS | RESPIRATION RATE: 17 BRPM | DIASTOLIC BLOOD PRESSURE: 70 MMHG | BODY MASS INDEX: 35.79 KG/M2 | TEMPERATURE: 97.8 F | HEART RATE: 71 BPM

## 2022-11-30 PROCEDURE — 99024 POSTOP FOLLOW-UP VISIT: CPT

## 2022-11-30 PROCEDURE — 51798 US URINE CAPACITY MEASURE: CPT

## 2022-11-30 NOTE — HISTORY OF PRESENT ILLNESS
[FreeTextEntry1] : OAB nocturia x 3 , frequent urination . Histor of Parkinsons . PVR was rising so decided for outlet procedure.\par He is 6 weeks s/p aqua Ablation and is here for PVR andassessment [Nocturia] : nocturia

## 2022-11-30 NOTE — ASSESSMENT
[FreeTextEntry1] : Mr Gaspar had a difficult 3 -5 days after surgery . He has burning at the tip of his penis , he was constipated and unhappy with the results .\par Today he seemed confused and wanted to know "why" he had the procedure . I reviewed Dr Bower  and Dr Hernandez's notes . \par He is voiding better but still with frequency .Nocturia x 2 down from 3 We discussed behavior modifications such as limiting fluids after 8 . He is also constipated . He can go 3 days without having a BM . Recommend a bowel routine with Miralax \par PVR today was 98 cc He was unable to give me a culture . He will drop it off in [place] . I want to see if his frequency is related to a UTI

## 2022-12-01 NOTE — H&P PST ADULT - FALL HARM RISK - FALLEN IN PAST
Lumbar Transforaminal Epidural Steroid Injection under Fluoroscopic Guidance    The procedure, risks, benefits, and options were discussed with the patient. There are no contraindications to the procedure. The patent expressed understanding and agreed to the procedure. Informed written consent was obtained prior to the start of the procedure and can be found in the patient's chart.    PATIENT NAME: Fabiana Chanel   MRN: [id number]     DATE OF PROCEDURE: 12/01/2022    PROCEDURE:  Right  L4/5 and L5/S1 Lumbar Transforaminal Epidural Steroid Injection under Fluoroscopic Guidance    PRE-OP DIAGNOSIS: Lumbar radiculopathy [M54.16] Lumbar radiculopathy [M54.16]    POST-OP DIAGNOSIS: Same    PHYSICIAN: Jed Yeager MD    ASSISTANTS: Shantanu Andino MD and Jamie Villagran    MEDICATIONS INJECTED: Preservative-free Decadron 10mg with 5cc of Lidocaine 1% MPF     LOCAL ANESTHETIC INJECTED: Xylocaine 2%     SEDATION: Versed 2mg and Fentanyl 50mcg                                                                                                                                                                                     Conscious sedation ordered by M.D. Patient re-evaluation prior to administration of conscious sedation. No changes noted in patient's status from initial evaluation. The patient's vital signs were monitored by RN and patient remained hemodynamically stable throughout the procedure.    Event Time In   Sedation Start 1641   Sedation End 1648       ESTIMATED BLOOD LOSS: None    COMPLICATIONS: None    TECHNIQUE: Time-out was performed to identify the patient and procedure to be performed. With the patient laying in a prone position, the surgical area was prepped and draped in the usual sterile fashion using ChloraPrep and a fenestrated drape.The levels were determined under fluoroscopy guidance. Skin anesthesia was achieved by injecting Lidocaine 2% over the injection sites. The transforaminal spaces were then  approached with a 22 gauge, 5 inch spinal quinke needle that was introduced under fluoroscopic guidance in the AP and Lateral views. Once the needle tip was in the area of the transforaminal space, and there was no blood, CSF or paraesthesias, contrast dye Omnipaque (300mg/mL) was injected to confirm placement and there was no vascular runoff. Fluoroscopic imaging in the AP and lateral views revealed a clear outline of the spinal nerve with proximal spread of agent through the neural foramen into the epidural space. 3 mL of the medication mixture listed above was injected slowly at each site. Displacement of the radio opaque contrast after injection of the medication confirmed that the medication went into the area of the transforaminal spaces. The needles were removed and bleeding was nil. A sterile dressing was applied. No specimens collected. The patient tolerated the procedure well.       The patient was monitored after the procedure in the recovery area. They were given post-procedure and discharge instructions to follow at home. The patient was discharged in a stable condition.      Jed Yeager MD    No

## 2022-12-02 ENCOUNTER — RX RENEWAL (OUTPATIENT)
Age: 77
End: 2022-12-02

## 2022-12-02 LAB — BACTERIA UR CULT: NORMAL

## 2023-01-18 ENCOUNTER — RX RENEWAL (OUTPATIENT)
Age: 78
End: 2023-01-18

## 2023-01-23 ENCOUNTER — APPOINTMENT (OUTPATIENT)
Dept: NUCLEAR MEDICINE | Facility: IMAGING CENTER | Age: 78
End: 2023-01-23
Payer: MEDICARE

## 2023-01-23 ENCOUNTER — OUTPATIENT (OUTPATIENT)
Dept: OUTPATIENT SERVICES | Facility: HOSPITAL | Age: 78
LOS: 1 days | End: 2023-01-23
Payer: MEDICARE

## 2023-01-23 DIAGNOSIS — Z98.52 VASECTOMY STATUS: Chronic | ICD-10-CM

## 2023-01-23 DIAGNOSIS — G20 PARKINSON'S DISEASE: ICD-10-CM

## 2023-01-23 DIAGNOSIS — Z98.890 OTHER SPECIFIED POSTPROCEDURAL STATES: Chronic | ICD-10-CM

## 2023-01-23 DIAGNOSIS — E89.0 POSTPROCEDURAL HYPOTHYROIDISM: Chronic | ICD-10-CM

## 2023-01-23 PROCEDURE — A9584: CPT

## 2023-01-23 PROCEDURE — 78803 RP LOCLZJ TUM SPECT 1 AREA: CPT | Mod: 26,MH

## 2023-01-23 PROCEDURE — 78803 RP LOCLZJ TUM SPECT 1 AREA: CPT | Mod: MH

## 2023-04-13 ENCOUNTER — APPOINTMENT (OUTPATIENT)
Dept: NEUROLOGY | Facility: CLINIC | Age: 78
End: 2023-04-13
Payer: MEDICARE

## 2023-04-13 VITALS — DIASTOLIC BLOOD PRESSURE: 84 MMHG | HEART RATE: 80 BPM | SYSTOLIC BLOOD PRESSURE: 143 MMHG

## 2023-04-13 VITALS
BODY MASS INDEX: 34.36 KG/M2 | HEIGHT: 70 IN | DIASTOLIC BLOOD PRESSURE: 84 MMHG | WEIGHT: 240 LBS | SYSTOLIC BLOOD PRESSURE: 131 MMHG | HEART RATE: 76 BPM

## 2023-04-13 DIAGNOSIS — G20 PARKINSON'S DISEASE: ICD-10-CM

## 2023-04-13 PROCEDURE — 99205 OFFICE O/P NEW HI 60 MIN: CPT

## 2023-04-22 ENCOUNTER — RX RENEWAL (OUTPATIENT)
Age: 78
End: 2023-04-22

## 2023-05-11 ENCOUNTER — OUTPATIENT (OUTPATIENT)
Dept: OUTPATIENT SERVICES | Facility: HOSPITAL | Age: 78
LOS: 1 days | End: 2023-05-11
Payer: MEDICARE

## 2023-05-11 ENCOUNTER — APPOINTMENT (OUTPATIENT)
Dept: MRI IMAGING | Facility: HOSPITAL | Age: 78
End: 2023-05-11
Payer: MEDICARE

## 2023-05-11 DIAGNOSIS — G20 PARKINSON'S DISEASE: ICD-10-CM

## 2023-05-11 DIAGNOSIS — Z98.890 OTHER SPECIFIED POSTPROCEDURAL STATES: Chronic | ICD-10-CM

## 2023-05-11 DIAGNOSIS — E89.0 POSTPROCEDURAL HYPOTHYROIDISM: Chronic | ICD-10-CM

## 2023-05-11 DIAGNOSIS — Z98.52 VASECTOMY STATUS: Chronic | ICD-10-CM

## 2023-05-11 PROCEDURE — 70551 MRI BRAIN STEM W/O DYE: CPT

## 2023-05-11 PROCEDURE — 70551 MRI BRAIN STEM W/O DYE: CPT | Mod: 26,MH

## 2023-05-12 ENCOUNTER — APPOINTMENT (OUTPATIENT)
Dept: NEUROLOGY | Facility: CLINIC | Age: 78
End: 2023-05-12
Payer: MEDICARE

## 2023-05-12 PROCEDURE — 99443: CPT | Mod: NC,95

## 2023-05-18 ENCOUNTER — NON-APPOINTMENT (OUTPATIENT)
Age: 78
End: 2023-05-18

## 2023-05-18 ENCOUNTER — APPOINTMENT (OUTPATIENT)
Dept: NEUROSURGERY | Facility: CLINIC | Age: 78
End: 2023-05-18
Payer: MEDICARE

## 2023-05-18 VITALS
OXYGEN SATURATION: 97 % | BODY MASS INDEX: 32.93 KG/M2 | HEART RATE: 97 BPM | DIASTOLIC BLOOD PRESSURE: 93 MMHG | HEIGHT: 70 IN | WEIGHT: 230 LBS | SYSTOLIC BLOOD PRESSURE: 162 MMHG | TEMPERATURE: 97.1 F

## 2023-05-18 PROCEDURE — 99203 OFFICE O/P NEW LOW 30 MIN: CPT

## 2023-05-18 NOTE — HISTORY OF PRESENT ILLNESS
[de-identified] : 77 year- old, right handed male with PMHX of CLL, hypothyroid, HTN, HLD, gout, BPH s/p prostate ablation Nov 2022, who presented as referral form neurologist Dr. Alvarez for evaluation of NPH. \par \par Pt was misdiagnosed with Parkinson's disease about 6/7 yrs ago after being seen by neurology s/p fall while walking on cobblestone. \par Vadim scan done on 1/23/2023 was normal. Pt was started and titrating up to Sinemet 25/100 5 x a day; he has slowly decreased to TID. He has noticed no changes with decreasing the dose.\par \par He was seen by neurologist Dr. Alvarez 4/13/23 and on exam was noticed to have some shortening of gait on stride. Also noted to have urinary urgency and frequency. Hx of prostate ablation. Was referred to obtain MRI brain for NPH workup which he completed 5/11/23 which has report of ventricular dilatation.\par \par TODAY pt presents for neurosurgical evaluation. Pt admits to gait imbalance x several years which has not increased in severity. \par Pt admits to gait imbalance & episodes of urinary frequency which occurred last night. Pt denies any headaches, changes in vision, or \par urinary incontinence.

## 2023-05-18 NOTE — ASSESSMENT
[FreeTextEntry1] : PLAN:\par -Schedule visit with Neuropsychology team \par -Schedule lumbar puncture \par -RTC for follow up after LP to discuss and possible VPS planning pending LP results \par \par \par I, Dr. Kirby, personally performed the evaluation and management (E/M) services for this established patient who presents today with (a) new problem(s)/exacerbation of (an) existing condition(s). That E/M includes conducting the examination, assessing all new/exacerbated conditions, and establishing a new plan of care. Today, my ACP, Reba Mullins, was here to observe my evaluation and management services for this new problem/exacerbated condition to be followed going forward.\par

## 2023-05-18 NOTE — PHYSICAL EXAM
[General Appearance - Alert] : alert [General Appearance - In No Acute Distress] : in no acute distress [General Appearance - Well Nourished] : well nourished [General Appearance - Well Developed] : well developed [General Appearance - Well-Appearing] : healthy appearing [Oriented To Time, Place, And Person] : oriented to person, place, and time [Time] : oriented to time [Past History] : adequate knowledge of personal past history [Vocabulary] : adequate range of vocabulary [Cranial Nerves Optic (II)] : visual acuity intact bilaterally,  pupils equal round and reactive to light [Cranial Nerves Oculomotor (III)] : extraocular motion intact [Cranial Nerves Trigeminal (V)] : facial sensation intact symmetrically [Cranial Nerves Facial (VII)] : face symmetrical [Cranial Nerves Vestibulocochlear (VIII)] : hearing was intact bilaterally [Cranial Nerves Glossopharyngeal (IX)] : tongue and palate midline [Cranial Nerves Accessory (XI - Cranial And Spinal)] : head turning and shoulder shrug symmetric [Cranial Nerves Hypoglossal (XII)] : there was no tongue deviation with protrusion [Motor Tone] : muscle tone was normal in all four extremities [Motor Strength] : muscle strength was normal in all four extremities [No Muscle Atrophy] : normal bulk in all four extremities [Sensation Tactile Decrease] : light touch was intact [Balance] : balance was intact [2+] : Patella left 2+ [No Visual Abnormalities] : no visible abnormalities [Sclera] : the sclera and conjunctiva were normal [Outer Ear] : the ears and nose were normal in appearance [Neck Appearance] : the appearance of the neck was normal [Heart Rate And Rhythm] : heart rate was normal and rhythm regular [Full Pulse] : the pedal pulses are present [Edema] : there was no peripheral edema [Bowel Sounds] : normal bowel sounds [Abdomen Soft] : soft [Abdomen Tenderness] : non-tender [Abdomen Mass (___ Cm)] : no abdominal mass palpated [No CVA Tenderness] : no ~M costovertebral angle tenderness [No Spinal Tenderness] : no spinal tenderness [Skin Color & Pigmentation] : normal skin color and pigmentation [Skin Turgor] : normal skin turgor [] : no rash [Person] : disoriented to person [Place] : disoriented to place [Short Term Intact] : short term memory impaired [Remote Intact] : remote memory impaired [Span Intact] : the attention span was decreased [Concentration Intact] : a decrease in concentrating ability was observed [Fluency] : fluency not intact [Comprehension] : comprehension not intact [Current Events] : inadequate knowledge of current events [Past-pointing] : there was no past-pointing [Tremor] : no tremor present [FreeTextEntry1] : unsteady gait when walking on exam

## 2023-05-18 NOTE — DATA REVIEWED
[de-identified] : \par \par \par ACC: [id number] EXAM: MR BRAIN ORDERED BY: HAYDEE YBARRA\par \par PROCEDURE DATE: 05/11/2023\par \par \par \par INTERPRETATION: CLINICAL INFORMATION: Unsteady gait, clinical considerations include Parkinson's versus hydrocephalus.\par \par TECHNIQUE: Multiplanar, multisequence brain MRI was performed without intravenous contrast.\par \par COMPARISON: Brain ANGELO scan dated 1/23/2023.\par \par FINDINGS:\par \par There is no evidence of acute infarct. There are no foci of susceptibility artifact to suggest hemorrhage. Mineralization of the bilateral basal ganglia. Scattered foci of T2/FLAIR hyperintensity in the bilateral hemispheric white matter are nonspecific but likely related to chronic white matter microvascular ischemic disease. Ventricular dilatation with prominence of bilateral sylvian fissures and narrow callosal angle, suggesting normal pressure hydrocephalus in the appropriate clinical setting versus central atrophy. Otherwise, the major intracranial vessels at the skull base follow expected course and contour.\par \par The paranasal sinuses demonstrate no signal abnormality. The mastoids demonstrate no signal abnormality. Status post bilateral intraocular lens implants.\par \par \par IMPRESSION:\par Ventricular dilatation with prominence of bilateral sylvian fissures and narrow callosal angle, suggesting normal pressure hydrocephalus in the appropriate clinical setting versus central atrophy.\par \par --- End of Report ---\par

## 2023-05-24 ENCOUNTER — NON-APPOINTMENT (OUTPATIENT)
Age: 78
End: 2023-05-24

## 2023-05-26 ENCOUNTER — APPOINTMENT (OUTPATIENT)
Dept: NEUROLOGY | Facility: CLINIC | Age: 78
End: 2023-05-26
Payer: MEDICARE

## 2023-05-26 PROCEDURE — 96138 PSYCL/NRPSYC TECH 1ST: CPT | Mod: 95

## 2023-05-26 PROCEDURE — 96133 NRPSYC TST EVAL PHYS/QHP EA: CPT | Mod: 95

## 2023-05-26 PROCEDURE — 96132 NRPSYC TST EVAL PHYS/QHP 1ST: CPT | Mod: 95

## 2023-05-26 PROCEDURE — 96139 PSYCL/NRPSYC TST TECH EA: CPT | Mod: 95

## 2023-05-26 PROCEDURE — 96116 NUBHVL XM PHYS/QHP 1ST HR: CPT | Mod: 95

## 2023-06-01 ENCOUNTER — APPOINTMENT (OUTPATIENT)
Dept: FAMILY MEDICINE | Facility: CLINIC | Age: 78
End: 2023-06-01
Payer: MEDICARE

## 2023-06-01 ENCOUNTER — NON-APPOINTMENT (OUTPATIENT)
Age: 78
End: 2023-06-01

## 2023-06-01 VITALS
SYSTOLIC BLOOD PRESSURE: 160 MMHG | HEIGHT: 70 IN | OXYGEN SATURATION: 98 % | WEIGHT: 230 LBS | RESPIRATION RATE: 16 BRPM | HEART RATE: 73 BPM | TEMPERATURE: 96.9 F | DIASTOLIC BLOOD PRESSURE: 90 MMHG | BODY MASS INDEX: 32.93 KG/M2

## 2023-06-01 PROCEDURE — G0439: CPT

## 2023-06-01 NOTE — HEALTH RISK ASSESSMENT
[None] : None [With Significant Other] : lives with significant other [Retired] : retired [] :  [Feels Safe at Home] : Feels safe at home [Fully functional (bathing, dressing, toileting, transferring, walking, feeding)] : Fully functional (bathing, dressing, toileting, transferring, walking, feeding) [Fully functional (using the telephone, shopping, preparing meals, housekeeping, doing laundry, using] : Fully functional and needs no help or supervision to perform IADLs (using the telephone, shopping, preparing meals, housekeeping, doing laundry, using transportation, managing medications and managing finances) [Smoke Detector] : smoke detector [Carbon Monoxide Detector] : carbon monoxide detector [With Patient/Caregiver] : , with patient/caregiver [Designated Healthcare Proxy] : Designated healthcare proxy [Name: ___] : Health Care Proxy's Name: [unfilled]  [Relationship: ___] : Relationship: [unfilled] [Change in mental status noted] : No change in mental status noted [Sexually Active] : not sexually active [Reports changes in hearing] : Reports no changes in hearing [Reports changes in vision] : Reports no changes in vision [Reports changes in dental health] : Reports no changes in dental health [Guns at Home] : no guns at home

## 2023-06-01 NOTE — HISTORY OF PRESENT ILLNESS
[de-identified] : 78y/o M PMHx HTN, CLL, Parkinsons , Gout  (Allopurinol) presents to office for routine Medicare Wellness exam. Pt states he recently was told "he didn’t have Parkinsons  but  has NPH) Saw Dr Geiger will have more tests and ultimately have procedure inserting shunt. Recent  ablation  of prostate (enlarged prostate and retention). Having lUmbar spinal tap in 2 weeks. Continues to have frequency of urination nocturia every 2 hours. Also coughing for 2 weks. Was prescribed Tessalon with no relief.  Dry constant cough

## 2023-06-01 NOTE — HEALTH RISK ASSESSMENT
[Change in mental status noted] : Change in mental status noted [With Significant Other] : lives with significant other [Retired] : retired [] :  [Sexually Active] : sexually active [Fully functional (bathing, dressing, toileting, transferring, walking, feeding)] : Fully functional (bathing, dressing, toileting, transferring, walking, feeding) [Fully functional (using the telephone, shopping, preparing meals, housekeeping, doing laundry, using] : Fully functional and needs no help or supervision to perform IADLs (using the telephone, shopping, preparing meals, housekeeping, doing laundry, using transportation, managing medications and managing finances) [Smoke Detector] : smoke detector [Carbon Monoxide Detector] : carbon monoxide detector [With Patient/Caregiver] : , with patient/caregiver [Designated Healthcare Proxy] : Designated healthcare proxy [Name: ___] : Health Care Proxy's Name: [unfilled]  [Relationship: ___] : Relationship: [unfilled] [Reports changes in hearing] : Reports no changes in hearing [Reports changes in vision] : Reports no changes in vision [Reports changes in dental health] : Reports no changes in dental health [Guns at Home] : no guns at home

## 2023-06-01 NOTE — COUNSELING
[Fall prevention counseling provided] : Fall prevention counseling provided [Adequate lighting] : Adequate lighting [No throw rugs] : No throw rugs [Use proper foot wear] : Use proper foot wear [Potential consequences of obesity discussed] : Potential consequences of obesity discussed [Benefits of weight loss discussed] : Benefits of weight loss discussed [Structured Weight Management Program suggested:] : Structured weight management program suggested [Encouraged to maintain food diary] : Encouraged to maintain food diary [Encouraged to increase physical activity] : Encouraged to increase physical activity [Encouraged to use exercise tracking device] : Encouraged to use exercise tracking device [FreeTextEntry4] : 15

## 2023-06-01 NOTE — HISTORY OF PRESENT ILLNESS
[de-identified] : 76 y/o M PMHx HTN, CLL, Parkinsons , Gout  (Allopurinol) presents to office for routine Medicare Wellness exam.  Fully VAccinated for Covid.  Hd Covid 4/21. Sees Urologist , Hem, Nephro. Labs reviewed WBC 54,000, GFR 43 TSH 7.0. Pt recently completed PT for gait disturbance and would like to continue PT. Currently was getting PT in house.  Uses cane in side and walker outside

## 2023-06-02 ENCOUNTER — RESULT REVIEW (OUTPATIENT)
Age: 78
End: 2023-06-02

## 2023-06-02 ENCOUNTER — APPOINTMENT (OUTPATIENT)
Dept: UROLOGY | Facility: CLINIC | Age: 78
End: 2023-06-02
Payer: MEDICARE

## 2023-06-02 ENCOUNTER — OUTPATIENT (OUTPATIENT)
Dept: OUTPATIENT SERVICES | Facility: HOSPITAL | Age: 78
LOS: 1 days | End: 2023-06-02
Payer: MEDICARE

## 2023-06-02 ENCOUNTER — APPOINTMENT (OUTPATIENT)
Dept: RADIOLOGY | Facility: HOSPITAL | Age: 78
End: 2023-06-02
Payer: MEDICARE

## 2023-06-02 ENCOUNTER — LABORATORY RESULT (OUTPATIENT)
Age: 78
End: 2023-06-02

## 2023-06-02 DIAGNOSIS — B36.9 SUPERFICIAL MYCOSIS, UNSPECIFIED: ICD-10-CM

## 2023-06-02 DIAGNOSIS — Z01.818 ENCOUNTER FOR OTHER PREPROCEDURAL EXAMINATION: ICD-10-CM

## 2023-06-02 DIAGNOSIS — N31.9 NEUROMUSCULAR DYSFUNCTION OF BLADDER, UNSPECIFIED: ICD-10-CM

## 2023-06-02 DIAGNOSIS — E89.0 POSTPROCEDURAL HYPOTHYROIDISM: Chronic | ICD-10-CM

## 2023-06-02 PROCEDURE — 71046 X-RAY EXAM CHEST 2 VIEWS: CPT | Mod: 26

## 2023-06-02 PROCEDURE — 99213 OFFICE O/P EST LOW 20 MIN: CPT

## 2023-06-02 PROCEDURE — 71046 X-RAY EXAM CHEST 2 VIEWS: CPT

## 2023-06-02 RX ORDER — CLOTRIMAZOLE AND BETAMETHASONE DIPROPIONATE 10; .5 MG/G; MG/G
1-0.05 CREAM TOPICAL TWICE DAILY
Qty: 1 | Refills: 2 | Status: ACTIVE | COMMUNITY
Start: 2023-06-02 | End: 1900-01-01

## 2023-06-02 NOTE — PHYSICAL EXAM
[General Appearance - Well Developed] : well developed [General Appearance - Well Nourished] : well nourished [Abdomen Soft] : soft [Heart Rate And Rhythm] : Heart rate and rhythm were normal [] : no respiratory distress [No Focal Deficits] : no focal deficits [FreeTextEntry1] : walking with a walker

## 2023-06-02 NOTE — HISTORY OF PRESENT ILLNESS
[Nocturia] : nocturia [FreeTextEntry1] : OAB nocturia x 3 , frequent urination . Histor of Parkinsons . PVR was rising so decided for outlet procedure.\par He is 6 weeks s/p aqua Ablation and is here for PVR andassessment

## 2023-06-02 NOTE — ASSESSMENT
[FreeTextEntry1] : Mr Gaspar had a difficult 3 -5 days after surgery . He has burning at the tip of his penis , he was constipated and unhappy with the results .\par Today he seemed confused and wanted to know "why" he had the procedure . I reviewed Dr Bower  and Dr Hernandez's notes . \par He is voiding better but still with frequency .Nocturia x 2 down from 3 We discussed behavior modifications such as limiting fluids after 8 . He is also constipated . He can go 3 days without having a BM . Recommend a bowel routine with Miralax \par PVR today was 98 cc He was unable to give me a culture . He will drop it off in [place] . I want to see if his frequency is related to a UTI

## 2023-06-04 LAB
APTT BLD: 30.8 SEC
INR PPP: 1.01 RATIO
PT BLD: 11.7 SEC

## 2023-06-05 ENCOUNTER — APPOINTMENT (OUTPATIENT)
Dept: FAMILY MEDICINE | Facility: CLINIC | Age: 78
End: 2023-06-05

## 2023-06-08 ENCOUNTER — APPOINTMENT (OUTPATIENT)
Dept: FAMILY MEDICINE | Facility: CLINIC | Age: 78
End: 2023-06-08
Payer: MEDICARE

## 2023-06-08 VITALS
TEMPERATURE: 98.3 F | SYSTOLIC BLOOD PRESSURE: 143 MMHG | OXYGEN SATURATION: 97 % | RESPIRATION RATE: 16 BRPM | HEIGHT: 70 IN | HEART RATE: 62 BPM | BODY MASS INDEX: 34.32 KG/M2 | DIASTOLIC BLOOD PRESSURE: 80 MMHG | WEIGHT: 239.7 LBS

## 2023-06-08 PROCEDURE — 99215 OFFICE O/P EST HI 40 MIN: CPT

## 2023-06-08 NOTE — REVIEW OF SYSTEMS
[Nocturia] : nocturia [Frequency] : frequency [Joint Pain] : joint pain [Muscle Pain] : muscle pain [Back Pain] : back pain [Unsteady Walk] : ataxia [Negative] : Heme/Lymph [Dysuria] : no dysuria [Incontinence] : no incontinence [Hesitancy] : no hesitancy [Hematuria] : no hematuria [Impotence] : no impotency [Poor Libido] : libido not poor [Joint Stiffness] : no joint stiffness [Muscle Weakness] : no muscle weakness [Joint Swelling] : no joint swelling [Headache] : no headache [Dizziness] : no dizziness [Fainting] : no fainting [Confusion] : no confusion [Memory Loss] : no memory loss [FreeTextEntry8] : chronic [FreeTextEntry9] : chronic [de-identified] : ambulating with cane assistance

## 2023-06-08 NOTE — PHYSICAL EXAM
Plan of care discussed with patient. Patient instructed to notify nursing prior to self disconnecting from IV pump, so PICC lumens can be adequately flushed. Patient states ok.  Patient remains free of falls and trauma. Patient ambulating independently with spouse in [place]. Patient has no complaints of pain.  Milrinone continues to infuse to MELISSA PICC at 46.5 mcg/min (14 ml) and Furosemide decreased from 10mg/hr and is currently infusing at 5 mg /hr (0.5 ml) Discussed medications and care. Patient has no questions at this time. Will continue to monitor.           [Normal TMs] : both tympanic membranes were normal [Normal Rate] : normal rate  [Regular Rhythm] : with a regular rhythm [Normal S1, S2] : normal S1 and S2 [Declined Rectal Exam] : declined rectal exam [No Rash] : no rash [Coordination Grossly Intact] : coordination grossly intact [No Focal Deficits] : no focal deficits [Speech Grossly Normal] : speech grossly normal [Alert and Oriented x3] : oriented to person, place, and time [Normal Mood] : the mood was normal [Normal] : affect was normal and insight and judgment were intact [de-identified] : ambulating with cane assistance

## 2023-06-08 NOTE — HISTORY OF PRESENT ILLNESS
[(Patient denies any chest pain, claudication, dyspnea on exertion, orthopnea, palpitations or syncope)] : Patient denies any chest pain, claudication, dyspnea on exertion, orthopnea, palpitations or syncope [Moderate (4-6 METs)] : Moderate (4-6 METs) [FreeTextEntry1] : lumbar puncture  [FreeTextEntry2] : 06/14/23 [FreeTextEntry3] : MD Clayton Kirby [FreeTextEntry4] : 78 yo male with PMHx CLL, HTN (metoprolol, irbesartan) HLD (simvastatin) BPH (finasteride) gout (allopurinol) hypothyroid (levothyroxine) CKD stage III,\par presents to the office for pre operative clearance. [FreeTextEntry7] : EKG done: NSR @ 67 bpm

## 2023-06-08 NOTE — ASSESSMENT
[Patient Optimized for Surgery] : Patient optimized for surgery [Modify anti-platelet treatment prior to procedure] : Modify anti-platelet treatment prior to procedure [Modify medications prior to procedure] : Modify medications prior to procedure [FreeTextEntry4] : Patient optimized for procedure  [FreeTextEntry6] : d/c asa one week prior to procedure [FreeTextEntry7] : patient advised to d/c all OTC medications/supplements one week prior to procedure, verbalized understanding

## 2023-06-08 NOTE — END OF VISIT
[FreeTextEntry3] : I, Dr. Berry, personally performed the evaluation and management (E/M) services for this established patient who presents today with (a) new problem(s)/exacerbation of (an) existing condition(s). That E/M includes conducting the examination, assessing all new/exacerbated conditions, and establishing a new plan of care. Today, my nurse practitioner, Luab Perry, was here to observe my evaluation and management services fort his new problem/exacerbated condition to be follow going forward.\par

## 2023-06-12 LAB
25(OH)D3 SERPL-MCNC: 39.3 NG/ML
ALBUMIN SERPL ELPH-MCNC: 4.5 G/DL
ALP BLD-CCNC: 107 U/L
ALT SERPL-CCNC: 11 U/L
ANION GAP SERPL CALC-SCNC: 14 MMOL/L
APPEARANCE: CLEAR
AST SERPL-CCNC: 16 U/L
BACTERIA: NEGATIVE /HPF
BILIRUB SERPL-MCNC: 0.4 MG/DL
BILIRUBIN URINE: NEGATIVE
BLOOD URINE: NEGATIVE
BUN SERPL-MCNC: 22 MG/DL
CALCIUM SERPL-MCNC: 9.5 MG/DL
CAST: 0 /LPF
CHLORIDE SERPL-SCNC: 106 MMOL/L
CHOLEST SERPL-MCNC: 168 MG/DL
CO2 SERPL-SCNC: 24 MMOL/L
COLOR: YELLOW
CREAT SERPL-MCNC: 1.69 MG/DL
EGFR: 42 ML/MIN/1.73M2
EPITHELIAL CELLS: 2 /HPF
ESTIMATED AVERAGE GLUCOSE: 126 MG/DL
FOLATE SERPL-MCNC: >20 NG/ML
GLUCOSE QUALITATIVE U: NEGATIVE MG/DL
GLUCOSE SERPL-MCNC: 107 MG/DL
HBA1C MFR BLD HPLC: 6 %
HDLC SERPL-MCNC: 48 MG/DL
KETONES URINE: NEGATIVE MG/DL
LDLC SERPL CALC-MCNC: 94 MG/DL
LEUKOCYTE ESTERASE URINE: NEGATIVE
MICROSCOPIC-UA: NORMAL
NITRITE URINE: NEGATIVE
NONHDLC SERPL-MCNC: 120 MG/DL
PH URINE: 6
POTASSIUM SERPL-SCNC: 5 MMOL/L
PROT SERPL-MCNC: 6.4 G/DL
PROTEIN URINE: 100 MG/DL
PSA FREE FLD-MCNC: 21 %
PSA FREE SERPL-MCNC: 0.1 NG/ML
PSA SERPL-MCNC: 0.49 NG/ML
RED BLOOD CELLS URINE: 3 /HPF
SODIUM SERPL-SCNC: 144 MMOL/L
SPECIFIC GRAVITY URINE: 1.02
TRIGL SERPL-MCNC: 130 MG/DL
TSH SERPL-ACNC: 5.35 UIU/ML
UROBILINOGEN URINE: 0.2 MG/DL
VIT B12 SERPL-MCNC: 1356 PG/ML
WHITE BLOOD CELLS URINE: 0 /HPF

## 2023-06-14 ENCOUNTER — OUTPATIENT (OUTPATIENT)
Dept: OUTPATIENT SERVICES | Facility: HOSPITAL | Age: 78
LOS: 1 days | Discharge: HOME | End: 2023-06-14
Payer: MEDICARE

## 2023-06-14 DIAGNOSIS — Z98.52 VASECTOMY STATUS: Chronic | ICD-10-CM

## 2023-06-14 DIAGNOSIS — E89.0 POSTPROCEDURAL HYPOTHYROIDISM: Chronic | ICD-10-CM

## 2023-06-14 DIAGNOSIS — Z98.890 OTHER SPECIFIED POSTPROCEDURAL STATES: Chronic | ICD-10-CM

## 2023-06-14 LAB
BASE EXCESS BLDV CALC-SCNC: 1.3 MMOL/L — SIGNIFICANT CHANGE UP (ref -2–3)
BASOPHILS # BLD AUTO: 0.06 K/UL — SIGNIFICANT CHANGE UP (ref 0–0.2)
BASOPHILS NFR BLD AUTO: 0.1 % — SIGNIFICANT CHANGE UP (ref 0–2)
BLOOD GAS VENOUS - BLOOD UREA NITROGEN: 27 MG/DL — HIGH (ref 7–23)
BLOOD GAS VENOUS - CREATININE: 2 MG/DL — HIGH (ref 0.5–1.3)
CA-I SERPL-SCNC: 1.21 MMOL/L — SIGNIFICANT CHANGE UP (ref 1.15–1.33)
CO2 BLDV-SCNC: 28.6 MMOL/L — HIGH (ref 22–26)
COHGB MFR BLDV: 0.4 % — SIGNIFICANT CHANGE UP
EOSINOPHIL # BLD AUTO: 0.11 K/UL — SIGNIFICANT CHANGE UP (ref 0–0.5)
EOSINOPHIL NFR BLD AUTO: 0.2 % — SIGNIFICANT CHANGE UP (ref 0–6)
GAS PNL BLDV: 140 MMOL/L — SIGNIFICANT CHANGE UP (ref 136–145)
GLUCOSE BLDV-MCNC: 106 MG/DL — HIGH (ref 70–99)
HCO3 BLDV-SCNC: 27 MMOL/L — SIGNIFICANT CHANGE UP (ref 22–29)
HCT VFR BLD CALC: 42.5 % — SIGNIFICANT CHANGE UP (ref 39–50)
HCT VFR BLDA CALC: 40 % — SIGNIFICANT CHANGE UP
HCT VFR BLDA CALC: 40 % — SIGNIFICANT CHANGE UP
HGB BLD CALC-MCNC: 13.4 G/DL — SIGNIFICANT CHANGE UP (ref 12.6–17.4)
HGB BLD-MCNC: 13.2 G/DL — SIGNIFICANT CHANGE UP (ref 13–17)
IMM GRANULOCYTES NFR BLD AUTO: 0.4 % — SIGNIFICANT CHANGE UP (ref 0–0.9)
LYMPHOCYTES # BLD AUTO: 48.61 K/UL — HIGH (ref 1–3.3)
LYMPHOCYTES # BLD AUTO: 85 % — HIGH (ref 13–44)
MANUAL SMEAR VERIFICATION: SIGNIFICANT CHANGE UP
MCHC RBC-ENTMCNC: 31.1 GM/DL — LOW (ref 32–36)
MCHC RBC-ENTMCNC: 32.4 PG — SIGNIFICANT CHANGE UP (ref 27–34)
MCV RBC AUTO: 104.2 FL — HIGH (ref 80–100)
METHGB MFR BLDV: 0.1 % — SIGNIFICANT CHANGE UP
MONOCYTES # BLD AUTO: 0.88 K/UL — SIGNIFICANT CHANGE UP (ref 0–0.9)
MONOCYTES NFR BLD AUTO: 1.5 % — LOW (ref 2–14)
NEUTROPHILS # BLD AUTO: 7.27 K/UL — SIGNIFICANT CHANGE UP (ref 1.8–7.4)
NEUTROPHILS NFR BLD AUTO: 12.8 % — LOW (ref 43–77)
NRBC # BLD: 0 /100 WBCS — SIGNIFICANT CHANGE UP (ref 0–0)
OVALOCYTES BLD QL SMEAR: SLIGHT — SIGNIFICANT CHANGE UP
PCO2 BLDV: 47 MMHG — SIGNIFICANT CHANGE UP (ref 42–55)
PH BLDV: 7.37 — SIGNIFICANT CHANGE UP (ref 7.32–7.43)
PLAT MORPH BLD: NORMAL — SIGNIFICANT CHANGE UP
PLATELET # BLD AUTO: 157 K/UL — SIGNIFICANT CHANGE UP (ref 150–400)
PO2 BLDV: <33 MMHG — SIGNIFICANT CHANGE UP (ref 25–45)
POTASSIUM BLDV-SCNC: 4.8 MMOL/L — SIGNIFICANT CHANGE UP (ref 3.5–5.1)
RBC # BLD: 4.08 M/UL — LOW (ref 4.2–5.8)
RBC # FLD: 14.3 % — SIGNIFICANT CHANGE UP (ref 10.3–14.5)
RBC BLD AUTO: ABNORMAL
SAO2 % BLDV: 47 % — LOW (ref 67–88)
WBC # BLD: 57.17 K/UL — CRITICAL HIGH (ref 3.8–10.5)
WBC # FLD AUTO: 57.17 K/UL — CRITICAL HIGH (ref 3.8–10.5)

## 2023-06-14 PROCEDURE — 82803 BLOOD GASES ANY COMBINATION: CPT

## 2023-06-14 PROCEDURE — 62272 THER SPI PNXR DRG CSF: CPT

## 2023-06-14 PROCEDURE — 85025 COMPLETE CBC W/AUTO DIFF WBC: CPT

## 2023-06-14 PROCEDURE — 96133 NRPSYC TST EVAL PHYS/QHP EA: CPT

## 2023-06-14 PROCEDURE — 36415 COLL VENOUS BLD VENIPUNCTURE: CPT

## 2023-06-14 PROCEDURE — 96137 PSYCL/NRPSYC TST PHY/QHP EA: CPT

## 2023-06-14 PROCEDURE — 96132 NRPSYC TST EVAL PHYS/QHP 1ST: CPT

## 2023-06-14 PROCEDURE — 96136 PSYCL/NRPSYC TST PHY/QHP 1ST: CPT

## 2023-06-14 RX ORDER — LIDOCAINE 4 G/100G
1 CREAM TOPICAL ONCE
Refills: 0 | Status: COMPLETED | OUTPATIENT
Start: 2023-06-14 | End: 2023-06-14

## 2023-06-14 RX ORDER — CHLORHEXIDINE GLUCONATE 213 G/1000ML
1 SOLUTION TOPICAL ONCE
Refills: 0 | Status: DISCONTINUED | OUTPATIENT
Start: 2023-06-14 | End: 2023-06-28

## 2023-06-14 RX ORDER — SODIUM CHLORIDE 9 MG/ML
1000 INJECTION INTRAMUSCULAR; INTRAVENOUS; SUBCUTANEOUS
Refills: 0 | Status: DISCONTINUED | OUTPATIENT
Start: 2023-06-14 | End: 2023-06-28

## 2023-06-14 RX ORDER — ACETAMINOPHEN 500 MG
650 TABLET ORAL ONCE
Refills: 0 | Status: COMPLETED | OUTPATIENT
Start: 2023-06-14 | End: 2023-06-14

## 2023-06-14 RX ORDER — HYDRALAZINE HCL 50 MG
10 TABLET ORAL ONCE
Refills: 0 | Status: COMPLETED | OUTPATIENT
Start: 2023-06-14 | End: 2023-06-14

## 2023-06-14 RX ADMIN — Medication 10 MILLIGRAM(S): at 11:30

## 2023-06-14 RX ADMIN — LIDOCAINE 1 PATCH: 4 CREAM TOPICAL at 11:30

## 2023-06-14 RX ADMIN — SODIUM CHLORIDE 75 MILLILITER(S): 9 INJECTION INTRAMUSCULAR; INTRAVENOUS; SUBCUTANEOUS at 11:28

## 2023-06-14 RX ADMIN — Medication 650 MILLIGRAM(S): at 11:30

## 2023-06-14 NOTE — PROGRESS NOTE ADULT - SUBJECTIVE AND OBJECTIVE BOX
Procedure: High volume Lumbar puncture  Doctor: Dr. Kirby  Consent: Signed by: patient                   NAME/NUMBER if not patient:     SUBJECTIVE:  77 year RH male patient was misdiagnosed with Parkinson's disease about 6/7 yrs ago. About 8 yrs ago, pt was walking on cobblestone and fell; this led to be seen by neurology. Vadim scan done on 1/23/2023 which was normal. Pt was started and titrating up to Sinemet 25/100 5 x a day; he has slowly decreased to TID. He has noticed no changes with decreasing the dose.  On exam, posture is moderately stooped, with some shortening of gait stride. Pt requires pushing up to stand.   Postural reflexes are impaired. No other Parkinsonian features.  Pt states he experiences urinary urgency and frequency. Pt gets up q 2- 4 hrs to use the restroom. Pt had an ablation of his prostate in Nov 2022. Pt is currently on Finasteride 5 mg and Tamsulosin 0.4 mg  MRI brain done     PMHx: as above  PSHx: cataract surgery, thyroid surgery  Social Hx: former smoker, no alcohol use, retired  Medications: aspirin, levothyroxine, metoprolol, carbidopa-levodopa, finasteride, irbesartan, nystatin, simvastatin, tamsulosin, vitamin D  Allergies: NKDA    T(C): --  HR: --  BP: --  RR: --  SpO2: --  Wt(kg): --    EXAM:  Neurological: AOx3, NAD, FC, speech coherent   CN II-XII: EOMI, PERRL, face symmetric, tongue midline   Motor: MAEx4 5/5 UE and LE B/L   SILT throughout  WWP throughout   No pronator drift   Cardiovascular: normal rate and rhythm  Respiratory: unlabored breathing on RA   Gastrointestinal: abd soft, NT, ND   Extremities: no LE edema, DP pulses intact                Pregnancy test (serum hcg for any female under 56, must be resulted day before OR): [ ] Negative Result  [ ] Positive Result  [X ] N/A : male or female>57 y/o      COVID swab (in past 72hrs): n/a    CXR: normal   EKG: NSR   ECHO if available: n/a  Medical Clearances: see paper chart    Heparin drip:               If yes --> Needs to be held 2 hours prior to angiogram, order placed: []yes   []no, primary team aware []yes   [X]no   []n/a    Contrast allergy: [] yes   [X] no  If yes --> premedication ordered []y []n    Implanted Devices (pacemaker, drug pump...etc):  []YES   [X] NO                  If yes --> EPS consulted to interrogate device: [ ] YES  [ ] NO                            If yes -->  EPS called to let them know patient going for procedure: [ ] device needs to be turned off                                                                                                                                                 [ ] magnet needs to be placed for surgery                                                                                                                                                [ ] nothing to do per EP, may proceed with cautery use in OR                                       Assessment:  77 year RH male patient was misdiagnosed with Parkinson's disease about 6/7 yrs ago presenting for high volume lumbar puncture     Plan:    - Consent for cerebral angiogram obtained and in chart, all risks, benefits and alternatives discussed including risk of bleeding at access site, infection, spinal headache, stroke, vessel dissection  - NPO/IVF  - Return to cath lab recovery post procedure     Assessment:  Present when checked    []  GCS  E   V  M     Heart Failure: []Acute, [] acute on chronic , []chronic  Heart Failure:  [] Diastolic (HFpEF), [] Systolic (HFrEF), []Combined (HFpEF and HFrEF), [] RHF, [] Pulm HTN, [] Other    [] BRAEDEN, [] ATN, [] AIN, [] other  [] CKD1, [] CKD2, [] CKD 3, [] CKD 4, [] CKD 5, []ESRD    Encephalopathy: [] Metabolic, [] Hepatic, [] toxic, [] Neurological, [] Other    Abnormal Nurtitional Status: [] malnurtition (see nutrition note), [ ]underweight: BMI < 19, [] morbid obesity: BMI >40, [] Cachexia    [] Sepsis  [] hypovolemic shock,[] cardiogenic shock, [] hemorrhagic shock, [] neuogenic shock  [] Acute Respiratory Failure  []Cerebral edema, [] Brain compression/ herniation,   [] Functional quadriplegia  [] Acute blood loss anemia   Procedure: High volume Lumbar puncture  Doctor: Dr. Kirby  Consent: Signed by: patient                   NAME/NUMBER if not patient:     SUBJECTIVE:  77 year RH male patient was misdiagnosed with Parkinson's disease about 6/7 yrs ago. About 8 yrs ago, pt was walking on cobblestone and fell; this led to be seen by neurology. Vadim scan done on 1/23/2023 which was normal. Pt was started and titrating up to Sinemet 25/100 5 x a day; he has slowly decreased to TID. He has noticed no changes with decreasing the dose.  On exam, posture is moderately stooped, with some shortening of gait stride. Pt requires pushing up to stand.   Postural reflexes are impaired. No other Parkinsonian features.  Pt states he experiences urinary urgency and frequency. Pt gets up q 2- 4 hrs to use the restroom. Pt had an ablation of his prostate in Nov 2022. Pt is currently on Finasteride 5 mg and Tamsulosin 0.4 mg  MRI brain done showing dilated ventricles suggestive of normal pressure hydrocephalus.     PMHx: as above  PSHx: cataract surgery, thyroid surgery  Social Hx: former smoker, no alcohol use, retired  Medications: aspirin, levothyroxine, metoprolol, carbidopa-levodopa, finasteride, irbesartan, nystatin, simvastatin, tamsulosin, vitamin D  Allergies: NKDA    T(C): --  HR: --  BP: --  RR: --  SpO2: --  Wt(kg): --    EXAM:  Neurological: AOx3, NAD, FC, speech coherent   CN II-XII: EOMI, PERRL, face symmetric, tongue midline   Motor: MAEx4 5/5 UE and LE B/L   SILT throughout  WWP throughout   No pronator drift   Cardiovascular: normal rate and rhythm  Respiratory: unlabored breathing on RA   Gastrointestinal: abd soft, NT, ND   Extremities: no LE edema, DP pulses intact                Pregnancy test (serum hcg for any female under 56, must be resulted day before OR): [ ] Negative Result  [ ] Positive Result  [X ] N/A : male or female>55 y/o      COVID swab (in past 72hrs): n/a    CXR: normal   EKG: NSR   ECHO if available: n/a  Medical Clearances: see paper chart    Heparin drip:               If yes --> Needs to be held 2 hours prior to angiogram, order placed: []yes   []no, primary team aware []yes   [X]no   []n/a    Contrast allergy: [] yes   [X] no  If yes --> premedication ordered []y []n    Implanted Devices (pacemaker, drug pump...etc):  []YES   [X] NO                  If yes --> EPS consulted to interrogate device: [ ] YES  [ ] NO                            If yes -->  EPS called to let them know patient going for procedure: [ ] device needs to be turned off                                                                                                                                                 [ ] magnet needs to be placed for surgery                                                                                                                                                [ ] nothing to do per EP, may proceed with cautery use in OR                                       Assessment:  77 year RH male patient was misdiagnosed with Parkinson's disease about 6/7 yrs ago presenting for high volume lumbar puncture     Plan:    - Consent for cerebral angiogram obtained and in chart, all risks, benefits and alternatives discussed including risk of bleeding at access site, infection, spinal headache, stroke, vessel dissection  - NPO/IVF  - Return to cath lab recovery post procedure     Assessment:  Present when checked    []  GCS  E   V  M     Heart Failure: []Acute, [] acute on chronic , []chronic  Heart Failure:  [] Diastolic (HFpEF), [] Systolic (HFrEF), []Combined (HFpEF and HFrEF), [] RHF, [] Pulm HTN, [] Other    [] BRAEDEN, [] ATN, [] AIN, [] other  [] CKD1, [] CKD2, [] CKD 3, [] CKD 4, [] CKD 5, []ESRD    Encephalopathy: [] Metabolic, [] Hepatic, [] toxic, [] Neurological, [] Other    Abnormal Nurtitional Status: [] malnurtition (see nutrition note), [ ]underweight: BMI < 19, [] morbid obesity: BMI >40, [] Cachexia    [] Sepsis  [] hypovolemic shock,[] cardiogenic shock, [] hemorrhagic shock, [] neuogenic shock  [] Acute Respiratory Failure  []Cerebral edema, [] Brain compression/ herniation,   [] Functional quadriplegia  [] Acute blood loss anemia   Procedure: High volume Lumbar puncture  Doctor: Dr. Kirby  Consent: Signed by: patient                   NAME/NUMBER if not patient:     SUBJECTIVE:  77 year RH male with PMhx HLD, BPH, HLD, CLL presenting for high volume LP.  Patient was misdiagnosed with Parkinson's disease about 6/7 yrs ago. About 8 yrs ago, pt was walking on cobblestone and fell; this led to be seen by neurology. Vadim scan done on 1/23/2023 which was normal. Pt was started and titrating up to Sinemet 25/100 5 x a day; he has slowly decreased to TID. He has noticed no changes with decreasing the dose.  On exam, posture is moderately stooped, with some shortening of gait stride. Pt requires pushing up to stand.   Postural reflexes are impaired. No other Parkinsonian features.  Pt states he experiences urinary urgency and frequency. Pt gets up q 2- 4 hrs to use the restroom. Pt had an ablation of his prostate in Nov 2022. Pt is currently on Finasteride 5 mg and Tamsulosin 0.4 mg  MRI brain done showing dilated ventricles suggestive of normal pressure hydrocephalus.     PMHx: as above  PSHx: cataract surgery, thyroid surgery  Social Hx: former smoker, no alcohol use, retired  Medications: aspirin, levothyroxine, metoprolol, carbidopa-levodopa, finasteride, irbesartan, nystatin, simvastatin, tamsulosin, vitamin D  Allergies: NKDA    T(C): --  HR: --  BP: --  RR: --  SpO2: --  Wt(kg): --    EXAM:  Neurological: AOx3, NAD, FC, speech coherent   CN II-XII: EOMI, PERRL, face symmetric, tongue midline   Motor: MAEx4 5/5 UE and LE B/L   SILT throughout  WWP throughout   No pronator drift   Cardiovascular: normal rate and rhythm  Respiratory: unlabored breathing on RA   Gastrointestinal: abd soft, NT, ND   Extremities: no LE edema, DP pulses intact                Pregnancy test (serum hcg for any female under 56, must be resulted day before OR): [ ] Negative Result  [ ] Positive Result  [X ] N/A : male or female>55 y/o      COVID swab (in past 72hrs): n/a    CXR: normal   EKG: NSR   ECHO if available: n/a  Medical Clearances: see paper chart    Heparin drip:               If yes --> Needs to be held 2 hours prior to angiogram, order placed: []yes   []no, primary team aware []yes   [X]no   []n/a    Contrast allergy: [] yes   [X] no  If yes --> premedication ordered []y []n    Implanted Devices (pacemaker, drug pump...etc):  []YES   [X] NO                  If yes --> EPS consulted to interrogate device: [ ] YES  [ ] NO                            If yes -->  EPS called to let them know patient going for procedure: [ ] device needs to be turned off                                                                                                                                                 [ ] magnet needs to be placed for surgery                                                                                                                                                [ ] nothing to do per EP, may proceed with cautery use in OR                                       Assessment:  77 year RH male patient was misdiagnosed with Parkinson's disease about 6/7 yrs ago presenting for high volume lumbar puncture     Plan:    - Consent for high volume Lumbar puncture obtained and in chart, all risks, benefits and alternatives discussed including risk of bleeding at access site, infection, spinal headache,   - NPO/IVF  - Return to cath lab recovery post procedure     Assessment:  Present when checked    []  GCS  E   V  M     Heart Failure: []Acute, [] acute on chronic , []chronic  Heart Failure:  [] Diastolic (HFpEF), [] Systolic (HFrEF), []Combined (HFpEF and HFrEF), [] RHF, [] Pulm HTN, [] Other    [] BRAEDEN, [] ATN, [] AIN, [] other  [] CKD1, [] CKD2, [] CKD 3, [] CKD 4, [] CKD 5, []ESRD    Encephalopathy: [] Metabolic, [] Hepatic, [] toxic, [] Neurological, [] Other    Abnormal Nurtitional Status: [] malnurtition (see nutrition note), [ ]underweight: BMI < 19, [] morbid obesity: BMI >40, [] Cachexia    [] Sepsis  [] hypovolemic shock,[] cardiogenic shock, [] hemorrhagic shock, [] neuogenic shock  [] Acute Respiratory Failure  []Cerebral edema, [] Brain compression/ herniation,   [] Functional quadriplegia  [] Acute blood loss anemia

## 2023-06-15 ENCOUNTER — APPOINTMENT (OUTPATIENT)
Dept: NEUROSURGERY | Facility: CLINIC | Age: 78
End: 2023-06-15
Payer: MEDICARE

## 2023-06-15 DIAGNOSIS — Z98.890 OTHER SPECIFIED POSTPROCEDURAL STATES: ICD-10-CM

## 2023-06-15 PROCEDURE — 99202 OFFICE O/P NEW SF 15 MIN: CPT | Mod: 95

## 2023-06-15 PROCEDURE — 99212 OFFICE O/P EST SF 10 MIN: CPT | Mod: 95

## 2023-06-15 NOTE — REASON FOR VISIT
[Home] : at home, [unfilled] , at the time of the visit. [Medical Office: ([facility])___] : at the medical office located in  [Patient] : the patient [Follow-Up: _____] : a [unfilled] follow-up visit [Spouse] : spouse

## 2023-06-20 DIAGNOSIS — C91.10 CHRONIC LYMPHOCYTIC LEUKEMIA OF B-CELL TYPE NOT HAVING ACHIEVED REMISSION: ICD-10-CM

## 2023-06-20 DIAGNOSIS — R29.898 OTHER SYMPTOMS AND SIGNS INVOLVING THE MUSCULOSKELETAL SYSTEM: ICD-10-CM

## 2023-06-20 DIAGNOSIS — R35.1 NOCTURIA: ICD-10-CM

## 2023-06-20 DIAGNOSIS — G20 PARKINSON'S DISEASE: ICD-10-CM

## 2023-06-20 DIAGNOSIS — N18.32 CHRONIC KIDNEY DISEASE, STAGE 3B: ICD-10-CM

## 2023-06-20 DIAGNOSIS — N32.81 OVERACTIVE BLADDER: ICD-10-CM

## 2023-06-20 DIAGNOSIS — N40.1 BENIGN PROSTATIC HYPERPLASIA WITH LOWER URINARY TRACT SYMPTOMS: ICD-10-CM

## 2023-06-20 DIAGNOSIS — N39.41 URGE INCONTINENCE: ICD-10-CM

## 2023-06-20 DIAGNOSIS — I12.9 HYPERTENSIVE CHRONIC KIDNEY DISEASE WITH STAGE 1 THROUGH STAGE 4 CHRONIC KIDNEY DISEASE, OR UNSPECIFIED CHRONIC KIDNEY DISEASE: ICD-10-CM

## 2023-06-20 DIAGNOSIS — N31.9 NEUROMUSCULAR DYSFUNCTION OF BLADDER, UNSPECIFIED: ICD-10-CM

## 2023-06-20 DIAGNOSIS — Z86.16 PERSONAL HISTORY OF COVID-19: ICD-10-CM

## 2023-06-20 DIAGNOSIS — E78.5 HYPERLIPIDEMIA, UNSPECIFIED: ICD-10-CM

## 2023-06-22 NOTE — HISTORY OF PRESENT ILLNESS
[FreeTextEntry1] : 77 year- old, right handed male with PMHX of CLL, hypothyroid, HTN, HLD, gout, BPH s/p prostate ablation Nov 2022, who presented as referral form neurologist Dr. Alvarez for evaluation of NPH. \par \par Pt was misdiagnosed with Parkinson's disease about 6/7 yrs ago after being seen by neurology s/p fall while walking on cobblestone. \par Vadim scan done on 1/23/2023 was normal. Pt was started and titrating up to Sinemet 25/100 5 x a day; he has slowly decreased to TID. He has noticed no changes with decreasing the dose.\par \par He was seen by neurologist Dr. Alvarez 4/13/23 and on exam was noticed to have some shortening of gait on stride. Also noted to have urinary urgency and frequency. Hx of prostate ablation. Was referred to obtain MRI brain for NPH workup which he completed 5/11/23 which has report of ventricular dilatation.\par \par 5/18/23 pt presented for neurosurgical evaluation. \par With c/o gait imbalance x several years which has not increased in severity. Also episodes of urinary frequency. but no incontinence. Plan made for high volume LP. \par  \par 6/14/23 s/p High volume LP:\par Opening pressure 13; 30cc removed; closing pressure 6. \par Per neuropsych eval- with improvement after LP. \par Wife felt gait was about the same post LP. \par \par

## 2023-06-22 NOTE — ASSESSMENT
[FreeTextEntry1] : PLAN: \par - VPS\par - Okay to resume PT\par \par \par I, Dr. Kirby, personally performed the evaluation and management (E/M) services for this established patient who presents today with (a) new problem(s)/exacerbation of (an) existing condition(s). That E/M includes conducting the examination, assessing all new/exacerbated conditions, and establishing a new plan of care. Today, my ACP, Reba Mullins, was here to observe my evaluation and management services for this new problem/exacerbated condition to be followed going forward.\par \par

## 2023-06-22 NOTE — ADDENDUM
[FreeTextEntry1] : PATIENT:	David Gaspar 				 \par \par  \par \par  \par \par Thursday, Chely 15, 2023 \par \par  \par \par I saw David and his wife today by telehealth consultation.  We spent approximately 25 minutes discussing his care after his having undergone a high-volume lumbar puncture for normal pressure hydrocephalus.  Neuropsychiatric testing showed evidence of slight improvement in cognition.  However, he did not have a remarkable improvement in his gait nor his incontinence.  I do think David’s MRI is borderline in that I do think he has a fair amount of atrophy as compared to the size of his lateral ventricles, and I am concerned that the neurodegenerative process is primarily behind his increased CSF spaces.  That being said, the neuropsychiatric testing does suggest a possible improvement with shunting, and given the low risk of shunting and the lack of any other options, I do think the Kai ought to consider a laparoscopic  shunt insertion.  We did review the risks, benefits, and alternatives to this approach.  I do think they are likely to go ahead after a long discussion.  He can certainly resume his physical therapy, as there is no risk of worsening his current outcome with physical therapy, and I told the family so.  We will try to schedule this in the near future so as to accommodate their schedule. \par \par  \par \par  \par \par  \par \par Clayton Kirby MD \par \par  \par \par [facility] #[id number] \par \par

## 2023-07-07 ENCOUNTER — RX CHANGE (OUTPATIENT)
Age: 78
End: 2023-07-07

## 2023-07-11 ENCOUNTER — APPOINTMENT (OUTPATIENT)
Dept: FAMILY MEDICINE | Facility: CLINIC | Age: 78
End: 2023-07-11
Payer: MEDICARE

## 2023-07-11 VITALS
SYSTOLIC BLOOD PRESSURE: 134 MMHG | WEIGHT: 231.9 LBS | HEIGHT: 70 IN | TEMPERATURE: 98 F | DIASTOLIC BLOOD PRESSURE: 82 MMHG | OXYGEN SATURATION: 97 % | HEART RATE: 78 BPM | RESPIRATION RATE: 16 BRPM | BODY MASS INDEX: 33.2 KG/M2

## 2023-07-11 PROCEDURE — 99214 OFFICE O/P EST MOD 30 MIN: CPT

## 2023-07-14 NOTE — HISTORY OF PRESENT ILLNESS
[(Patient denies any chest pain, claudication, dyspnea on exertion, orthopnea, palpitations or syncope)] : Patient denies any chest pain, claudication, dyspnea on exertion, orthopnea, palpitations or syncope [Good (7-10 METs)] : Good (7-10 METs) [FreeTextEntry1] :  shunt for NPH [FreeTextEntry2] : 07/19/23 [FreeTextEntry3] : MD Clayton Kirby [FreeTextEntry4] : 78 yo male with PMHx CLL, HTN (metoprolol, irbesartan) HLD (simvastatin) BPH (finasteride) gout (allopurinol) hypothyroid (levothyroxine) CKD stage III presents to the office for pre operative clearance.  [FreeTextEntry7] : EKG done June 2023 NSR

## 2023-07-14 NOTE — PHYSICAL EXAM
[Normal Rate] : normal rate  [Regular Rhythm] : with a regular rhythm [Normal S1, S2] : normal S1 and S2 [Declined Rectal Exam] : declined rectal exam [No Joint Swelling] : no joint swelling [No Rash] : no rash [Coordination Grossly Intact] : coordination grossly intact [No Focal Deficits] : no focal deficits [Speech Grossly Normal] : speech grossly normal [Alert and Oriented x3] : oriented to person, place, and time [Normal Mood] : the mood was normal [Normal] : affect was normal and insight and judgment were intact [de-identified] : ambulating with cane assistance [de-identified] : ambulating with cane assistance

## 2023-07-14 NOTE — REVIEW OF SYSTEMS
[Nocturia] : nocturia [Frequency] : frequency [Joint Pain] : joint pain [Muscle Pain] : muscle pain [Back Pain] : back pain [Unsteady Walk] : ataxia [Memory Loss] : memory loss [Negative] : Heme/Lymph [Dysuria] : no dysuria [Incontinence] : no incontinence [Hesitancy] : no hesitancy [Hematuria] : no hematuria [Impotence] : no impotency [Poor Libido] : libido not poor [Joint Stiffness] : no joint stiffness [Muscle Weakness] : no muscle weakness [Joint Swelling] : no joint swelling [Headache] : no headache [Dizziness] : no dizziness [Fainting] : no fainting [Confusion] : no confusion [FreeTextEntry8] : chronic [FreeTextEntry9] : chronic [de-identified] : ambulating with cane assistance

## 2023-07-14 NOTE — RESULTS/DATA
[] : results reviewed [de-identified] : has CLL [de-identified] : June 2023, NSR [de-identified] : [facility], patient does not require repeat labs, EKG, or imaging. will use results from June 2023

## 2023-07-14 NOTE — ASSESSMENT
[Patient Optimized for Surgery] : Patient optimized for surgery [Modify medications prior to procedure] : Modify medications prior to procedure [FreeTextEntry4] : Patient optimized for procedure [FreeTextEntry7] : advised to d/c all OTC medications/supplements on week prior to procedure, verbalized understanding

## 2023-07-18 ENCOUNTER — TRANSCRIPTION ENCOUNTER (OUTPATIENT)
Age: 78
End: 2023-07-18

## 2023-07-18 VITALS
OXYGEN SATURATION: 96 % | WEIGHT: 240.3 LBS | TEMPERATURE: 97 F | RESPIRATION RATE: 18 BRPM | DIASTOLIC BLOOD PRESSURE: 90 MMHG | HEIGHT: 70 IN | SYSTOLIC BLOOD PRESSURE: 168 MMHG | HEART RATE: 68 BPM

## 2023-07-18 RX ORDER — IRBESARTAN 75 MG/1
1 TABLET ORAL
Qty: 0 | Refills: 0 | DISCHARGE

## 2023-07-18 NOTE — PATIENT PROFILE ADULT - FALL HARM RISK - HARM RISK INTERVENTIONS

## 2023-07-19 ENCOUNTER — TRANSCRIPTION ENCOUNTER (OUTPATIENT)
Age: 78
End: 2023-07-19

## 2023-07-19 ENCOUNTER — INPATIENT (INPATIENT)
Facility: HOSPITAL | Age: 78
LOS: 1 days | Discharge: NURSING, REHAB OR LONG-TERM CARE | DRG: 32 | End: 2023-07-21
Attending: NEUROLOGICAL SURGERY | Admitting: NEUROLOGICAL SURGERY
Payer: MEDICARE

## 2023-07-19 DIAGNOSIS — E89.0 POSTPROCEDURAL HYPOTHYROIDISM: Chronic | ICD-10-CM

## 2023-07-19 DIAGNOSIS — Z98.890 OTHER SPECIFIED POSTPROCEDURAL STATES: Chronic | ICD-10-CM

## 2023-07-19 DIAGNOSIS — Z98.52 VASECTOMY STATUS: Chronic | ICD-10-CM

## 2023-07-19 LAB
APTT BLD: 31.6 SEC — SIGNIFICANT CHANGE UP (ref 27.5–35.5)
BLD GP AB SCN SERPL QL: NEGATIVE — SIGNIFICANT CHANGE UP
HCT VFR BLD CALC: 41.3 % — SIGNIFICANT CHANGE UP (ref 39–50)
HGB BLD-MCNC: 13 G/DL — SIGNIFICANT CHANGE UP (ref 13–17)
INR BLD: 1.01 — SIGNIFICANT CHANGE UP (ref 0.88–1.16)
MAGNESIUM SERPL-MCNC: 2 MG/DL — SIGNIFICANT CHANGE UP (ref 1.6–2.6)
MCHC RBC-ENTMCNC: 31.5 GM/DL — LOW (ref 32–36)
MCHC RBC-ENTMCNC: 32.7 PG — SIGNIFICANT CHANGE UP (ref 27–34)
MCV RBC AUTO: 104 FL — HIGH (ref 80–100)
NRBC # BLD: 0 /100 WBCS — SIGNIFICANT CHANGE UP (ref 0–0)
NT-PROBNP SERPL-SCNC: 111 PG/ML — SIGNIFICANT CHANGE UP (ref 0–300)
PHOSPHATE SERPL-MCNC: 3.8 MG/DL — SIGNIFICANT CHANGE UP (ref 2.5–4.5)
PLATELET # BLD AUTO: 139 K/UL — LOW (ref 150–400)
PROTHROM AB SERPL-ACNC: 12 SEC — SIGNIFICANT CHANGE UP (ref 10.5–13.4)
RBC # BLD: 3.97 M/UL — LOW (ref 4.2–5.8)
RBC # FLD: 14 % — SIGNIFICANT CHANGE UP (ref 10.3–14.5)
RH IG SCN BLD-IMP: POSITIVE — SIGNIFICANT CHANGE UP
WBC # BLD: 37.39 K/UL — HIGH (ref 3.8–10.5)
WBC # FLD AUTO: 37.39 K/UL — HIGH (ref 3.8–10.5)

## 2023-07-19 PROCEDURE — 62223 ESTABLISH BRAIN CAVITY SHUNT: CPT | Mod: 62

## 2023-07-19 PROCEDURE — 70450 CT HEAD/BRAIN W/O DYE: CPT | Mod: 26

## 2023-07-19 PROCEDURE — 70450 CT HEAD/BRAIN W/O DYE: CPT | Mod: 26,77

## 2023-07-19 PROCEDURE — 61781 SCAN PROC CRANIAL INTRA: CPT

## 2023-07-19 DEVICE — CATH DISTAL ARES: Type: IMPLANTABLE DEVICE | Site: RIGHT | Status: FUNCTIONAL

## 2023-07-19 DEVICE — CATH BACTISEAL VENTRICULAR: Type: IMPLANTABLE DEVICE | Site: RIGHT | Status: FUNCTIONAL

## 2023-07-19 DEVICE — GWIRE ANGL .035X180 STD: Type: IMPLANTABLE DEVICE | Site: RIGHT | Status: FUNCTIONAL

## 2023-07-19 DEVICE — CATH EVD BACTISEAL: Type: IMPLANTABLE DEVICE | Site: RIGHT | Status: FUNCTIONAL

## 2023-07-19 DEVICE — SURGIFOAM PAD 8CM X 12.5CM X 10MM (100): Type: IMPLANTABLE DEVICE | Site: RIGHT | Status: FUNCTIONAL

## 2023-07-19 DEVICE — SURGIFLO HEMOSTATIC MATRIX KIT: Type: IMPLANTABLE DEVICE | Site: RIGHT | Status: FUNCTIONAL

## 2023-07-19 DEVICE — VALVE CODMAN CERTAS PLUS INLINE WITH SIPHONGUARD: Type: IMPLANTABLE DEVICE | Site: RIGHT | Status: FUNCTIONAL

## 2023-07-19 DEVICE — SCREW UN3 AXS SELF DRILL 1.5X4MM: Type: IMPLANTABLE DEVICE | Site: RIGHT | Status: FUNCTIONAL

## 2023-07-19 RX ORDER — SIMVASTATIN 20 MG/1
10 TABLET, FILM COATED ORAL AT BEDTIME
Refills: 0 | Status: DISCONTINUED | OUTPATIENT
Start: 2023-07-19 | End: 2023-07-21

## 2023-07-19 RX ORDER — HYDROCORTISONE 1 %
1 OINTMENT (GRAM) TOPICAL
Refills: 0 | Status: DISCONTINUED | OUTPATIENT
Start: 2023-07-19 | End: 2023-07-21

## 2023-07-19 RX ORDER — CHOLECALCIFEROL (VITAMIN D3) 125 MCG
2000 CAPSULE ORAL DAILY
Refills: 0 | Status: DISCONTINUED | OUTPATIENT
Start: 2023-07-19 | End: 2023-07-21

## 2023-07-19 RX ORDER — CEFAZOLIN SODIUM 1 G
2000 VIAL (EA) INJECTION EVERY 8 HOURS
Refills: 0 | Status: COMPLETED | OUTPATIENT
Start: 2023-07-19 | End: 2023-07-20

## 2023-07-19 RX ORDER — SENNA PLUS 8.6 MG/1
2 TABLET ORAL AT BEDTIME
Refills: 0 | Status: DISCONTINUED | OUTPATIENT
Start: 2023-07-19 | End: 2023-07-21

## 2023-07-19 RX ORDER — VALSARTAN 80 MG/1
160 TABLET ORAL DAILY
Refills: 0 | Status: DISCONTINUED | OUTPATIENT
Start: 2023-07-19 | End: 2023-07-21

## 2023-07-19 RX ORDER — CHLORHEXIDINE GLUCONATE 213 G/1000ML
1 SOLUTION TOPICAL ONCE
Refills: 0 | Status: COMPLETED | OUTPATIENT
Start: 2023-07-19 | End: 2023-07-19

## 2023-07-19 RX ORDER — FINASTERIDE 5 MG/1
5 TABLET, FILM COATED ORAL DAILY
Refills: 0 | Status: DISCONTINUED | OUTPATIENT
Start: 2023-07-19 | End: 2023-07-21

## 2023-07-19 RX ORDER — SODIUM CHLORIDE 9 MG/ML
1000 INJECTION INTRAMUSCULAR; INTRAVENOUS; SUBCUTANEOUS
Refills: 0 | Status: DISCONTINUED | OUTPATIENT
Start: 2023-07-19 | End: 2023-07-19

## 2023-07-19 RX ORDER — METOPROLOL TARTRATE 50 MG
100 TABLET ORAL DAILY
Refills: 0 | Status: DISCONTINUED | OUTPATIENT
Start: 2023-07-20 | End: 2023-07-21

## 2023-07-19 RX ORDER — HYDRALAZINE HCL 50 MG
10 TABLET ORAL EVERY 4 HOURS
Refills: 0 | Status: DISCONTINUED | OUTPATIENT
Start: 2023-07-19 | End: 2023-07-21

## 2023-07-19 RX ORDER — ACETAMINOPHEN 500 MG
1000 TABLET ORAL EVERY 8 HOURS
Refills: 0 | Status: DISCONTINUED | OUTPATIENT
Start: 2023-07-19 | End: 2023-07-21

## 2023-07-19 RX ORDER — ONDANSETRON 8 MG/1
4 TABLET, FILM COATED ORAL EVERY 6 HOURS
Refills: 0 | Status: DISCONTINUED | OUTPATIENT
Start: 2023-07-19 | End: 2023-07-21

## 2023-07-19 RX ORDER — ALLOPURINOL 300 MG
300 TABLET ORAL DAILY
Refills: 0 | Status: DISCONTINUED | OUTPATIENT
Start: 2023-07-20 | End: 2023-07-21

## 2023-07-19 RX ORDER — LABETALOL HCL 100 MG
10 TABLET ORAL
Refills: 0 | Status: DISCONTINUED | OUTPATIENT
Start: 2023-07-19 | End: 2023-07-21

## 2023-07-19 RX ORDER — LEVOTHYROXINE SODIUM 125 MCG
88 TABLET ORAL DAILY
Refills: 0 | Status: DISCONTINUED | OUTPATIENT
Start: 2023-07-19 | End: 2023-07-21

## 2023-07-19 RX ORDER — POVIDONE-IODINE 5 %
1 AEROSOL (ML) TOPICAL ONCE
Refills: 0 | Status: COMPLETED | OUTPATIENT
Start: 2023-07-19 | End: 2023-07-19

## 2023-07-19 RX ADMIN — Medication 1 APPLICATION(S): at 18:34

## 2023-07-19 RX ADMIN — Medication 10 MILLIGRAM(S): at 22:23

## 2023-07-19 RX ADMIN — Medication 10 MILLIGRAM(S): at 10:16

## 2023-07-19 RX ADMIN — CHLORHEXIDINE GLUCONATE 1 APPLICATION(S): 213 SOLUTION TOPICAL at 06:25

## 2023-07-19 RX ADMIN — Medication 10 MILLIGRAM(S): at 17:51

## 2023-07-19 RX ADMIN — Medication 1 APPLICATION(S): at 06:36

## 2023-07-19 RX ADMIN — Medication 10 MILLIGRAM(S): at 13:13

## 2023-07-19 RX ADMIN — Medication 100 MILLIGRAM(S): at 17:22

## 2023-07-19 RX ADMIN — SENNA PLUS 2 TABLET(S): 8.6 TABLET ORAL at 22:23

## 2023-07-19 NOTE — H&P ADULT - NSHPPHYSICALEXAM_GEN_ALL_CORE
Constitutional:  76 y/o male awake, alert in no acute distress.  Eyes:  Sclera anicteric, conjunctiva noninjected.  ENMT: Oropharyngeal mucosa moist, pink. Tongue midline.    Respiratory: Clear to auscultation bilaterally.  No rales, rhonchi, wheezes.  Cardiovascular: Regular rate and rhythm.  S1, S2 heard.  Gastrointestinal:  Soft, nontender, nondistended.  +BS.  Genitourinary:  Deferred.  Rectal: Deferred.  Vascular: Extremities warm, no ulcers, no discoloration of skin.   Neurological: Gen: AA&O x person/place and month/year but not date, conversant, appropriate.      CN II-XII grossly intact. PERRL, EOMI.    Motor: CORDOVA x 4, 4+/5 throughout UE/LE except 4/5 left LE.    Sens: Sensation intact to light touch throughout.    Plantar downgoing bilaterally.  No clonus.      No pronator drift, no dysmetria.  Skin: Warm, dry, no erythema.

## 2023-07-19 NOTE — H&P ADULT - NSICDXPASTSURGICALHX_GEN_ALL_CORE_FT
PAST SURGICAL HISTORY:  History of basal cell carcinoma excision 2021    History of partial thyroidectomy 2012, benign nodule    History of prostate surgery aquablation    History of vasectomy age 47

## 2023-07-19 NOTE — H&P ADULT - NSHPLABSRESULTS_GEN_ALL_CORE
< from: MR Head No Cont (05.11.23 @ 14:09) >    Ventricular dilatation with prominence of bilateral sylvian fissures and   narrow callosal angle, suggesting normal pressure hydrocephalus in the   appropriate clinical setting versus central atrophy.    < end of copied text >

## 2023-07-19 NOTE — BRIEF OPERATIVE NOTE - COMMENTS
Lelo REYNA first assisted the ventricular portion of the procedure from start to finish as there was no qualified resident or fellow available to assist.

## 2023-07-19 NOTE — H&P ADULT - HISTORY OF PRESENT ILLNESS
Taken from neurosurgery note on 6/16/23 "77 year RH male with PMhx HLD, BPH, HLD, CLL presenting for high volume LP.  Patient was misdiagnosed with Parkinson's disease about 6/7 yrs ago. About 8  yrs ago, pt was walking on cobblestone and fell; this led to be seen by  neurology. Vadim scan done on 1/23/2023 which was normal. Pt was started and  titrating up to Sinemet 25/100 5 x a day; he has slowly decreased to TID. He  has noticed no changes with decreasing the dose.  On exam, posture is moderately stooped, with some shortening of gait stride. Pt  requires pushing up to stand.  Postural reflexes are impaired. No other Parkinsonian features.  Pt states he experiences urinary urgency and frequency. Pt gets up q 2- 4 hrs  to use the restroom. Pt had an ablation of his prostate in Nov 2022. Pt is  currently on Finasteride 5 mg and Tamsulosin 0.4 mg  MRI brain done showing dilated ventricles suggestive of normal pressure  hydrocephalus.  "    76 y/o male PMHx BPH s/p TURP 2022, CKD III, CLL, HTN, Hypothyroidism, Gout, Parkinson's misdiagnosis weaning off sinemet, NPH presents for surgical intervention today after mild cognitive improvement and gait improvement after high volume LP.  >>> Taken from neurosurgery note on 6/16/23 "77 year RH male with PMhx HLD, BPH, HLD, CLL presenting for high volume LP.  Patient was misdiagnosed with Parkinson's disease about 6/7 yrs ago. About 8  yrs ago, pt was walking on cobblestone and fell; this led to be seen by  neurology. Vadim scan done on 1/23/2023 which was normal. Pt was started and  titrating up to Sinemet 25/100 5 x a day; he has slowly decreased to TID. He  has noticed no changes with decreasing the dose.  On exam, posture is moderately stooped, with some shortening of gait stride. Pt  requires pushing up to stand.  Postural reflexes are impaired. No other Parkinsonian features.  Pt states he experiences urinary urgency and frequency. Pt gets up q 2- 4 hrs  to use the restroom. Pt had an ablation of his prostate in Nov 2022. Pt is  currently on Finasteride 5 mg and Tamsulosin 0.4 mg  MRI brain done showing dilated ventricles suggestive of normal pressure  hydrocephalus.  "    76 y/o male PMHx BPH s/p TURP 2022, CKD III, CLL, HTN, Hypothyroidism, Gout, Parkinson's misdiagnosis now completely weaned off sinemet, s/p cataract surgery bilateral and hemithyroidectomy, NPH presents for surgical intervention today after mild cognitive improvement and gait improvement after high volume LP.  He reports continued urinary frequency/incontinence, difficulty with memory and difficulty walking. He ambulates with walker or cane at home.

## 2023-07-19 NOTE — BRIEF OPERATIVE NOTE - ASSISTANT(S)
ION REYNA, Brii Brunson MS3 ION REYNA (No qualified resident or fellow was available to assist), Brii Brunson MS3

## 2023-07-19 NOTE — H&P ADULT - VTE RISK ASSESSMENT
RICE     Rest an injury, elevate it, and use ice and compression as directed.   RICE stands for rest, ice, compression, and elevation. These can limit pain and swelling after an injury. RICE may be recommended to help treat fractures, sprains, strains, and bruises or bumps.   Home care  The following explain the details of RICE:  · Rest. Limit the use of the injured body part. This helps prevent further damage to the body part and gives it time to heal. In some cases, you may need a sling, brace, splint, or cast to help keep the body part still until it has healed.  · Ice. Applying ice right after an injury helps relieve pain and swelling. Wrap a bag of ice in a thin towel. Then, place it over the injured area. Do this for 10 to 15 minutes every 3 to 4 hours. Continue for the next 1 to 3 days or until your symptoms improve. Never put ice directly on your skin or ice an area longer than 15 minutes at a time.  · Compression. Putting pressure on an injury helps reduce swelling and provides support. Wrap the injured area firmly with an elastic bandage/wrap. Make sure not to wrap the bandage too tightly or you will cut off blood flow to the injured area. If your bandage loosens, rewrap it.  · Elevation. Keeping an injury raised above the level of your heart reduces swelling, pain, and throbbing. For instance, if you have a broken leg, it may help to rest your leg on several pillows when sitting or lying down. Try to keep the injured area elevated for at least 2 to 3 hours per day.  Follow-up care  Follow up with your health care provider, or as advised.  When to seek medical advice  Call your health care provider right away if any of these occur:  · Fever of 100.4°F (38°C) or higher, or as directed by your health care provider  · Increased pain or swelling in the injured body part  · Injured body part becomes cold, blue, or numb or tingly  · Signs of infection. These include warmth in the skin, redness, drainage, or  bad smell coming from the injured body part.  © 4947-5827 [employer]. [address] [place] [address], [place] [address]. All rights reserved. This information is not intended as a substitute for professional medical care. Always follow your healthcare professional's instructions.      You will be sent home with a prescription for Naproxen (Aleve) tablets. Please take 1 tablet every 12 hours as needed for pain. Please take with food to prevent upset stomach. Over-the-counter antacid medication such as Pepcid, Zantac, Prilosec can also help prevent stomach irritation.     <<--- Click to launch

## 2023-07-19 NOTE — H&P ADULT - ASSESSMENT
78 y/o male PMHx BPH s/p TURP 2022, CKD III, CLL, HTN, Hypothyroidism, Gout, Parkinson's misdiagnosis now completely weaned off sinemet, NPH preop for VPS today:    - NPO  - 3M  - Perioperative antibiotics  - SCDs  - Postop CTH from PACU then regional after review  - Poor gait preop, anticipate possible acute rehab postop as per Dr. Kirby    All above d/w Dr. Kirby who formed above plan. 76 y/o male PMHx BPH s/p TURP 2022, CKD III, CLL, HTN, Hypothyroidism, Gout, Parkinson's misdiagnosis now completely weaned off sinemet, NPH preop for VPS today:    - NPO  - 3M  - Perioperative antibiotics  - SCDs  - Postop CTH from PACU the tele after review  - Poor gait preop, anticipate possible acute rehab postop as per Dr. Kirby    All above d/w Dr. Kirby who formed above plan.

## 2023-07-19 NOTE — PRE-ANESTHESIA EVALUATION ADULT - NSANTHOSAYNRD_GEN_A_CORE
No. TRISHA screening performed.  STOP BANG Legend: 0-2 = LOW Risk; 3-4 = INTERMEDIATE Risk; 5-8 = HIGH Risk

## 2023-07-19 NOTE — BRIEF OPERATIVE NOTE - NSICDXBRIEFPROCEDURE_GEN_ALL_CORE_FT
PROCEDURES:  Placement,  shunt, using frameless stereotaxy and electromagnetic navigation 19-Jul-2023 07:50:43  Lelo Araiza

## 2023-07-19 NOTE — H&P ADULT - NSICDXPASTMEDICALHX_GEN_ALL_CORE_FT
PAST MEDICAL HISTORY:  Basal cell carcinoma head    CLL (chronic lymphocytic leukemia)     Enlarged prostate with lower urinary tract symptoms (LUTS)     GERD (gastroesophageal reflux disease)     Glaucoma no eye drops    Gout no attack > 5 years    History of 2019 novel coronavirus disease (COVID-19) April 2020, fatigue, fever and confusion. no hospitalization    HLD (hyperlipidemia)     HTN (hypertension)     Hypothyroidism     NPH (normal pressure hydrocephalus)

## 2023-07-19 NOTE — BRIEF OPERATIVE NOTE - OPERATION/FINDINGS
Placement of right frontal  Shunt using [employer] EM navigation, laparoscopic placement of peritoneal catheter by general surgery. Placement of right frontal  Shunt using [employer] EM navigation, laparoscopic placement of peritoneal catheter by general surgery. Certas valve set at 5.

## 2023-07-20 ENCOUNTER — TRANSCRIPTION ENCOUNTER (OUTPATIENT)
Age: 78
End: 2023-07-20

## 2023-07-20 LAB
ANION GAP SERPL CALC-SCNC: 12 MMOL/L — SIGNIFICANT CHANGE UP (ref 5–17)
BUN SERPL-MCNC: 29 MG/DL — HIGH (ref 7–23)
CALCIUM SERPL-MCNC: 9 MG/DL — SIGNIFICANT CHANGE UP (ref 8.4–10.5)
CHLORIDE SERPL-SCNC: 109 MMOL/L — HIGH (ref 96–108)
CO2 SERPL-SCNC: 21 MMOL/L — LOW (ref 22–31)
CREAT SERPL-MCNC: 1.74 MG/DL — HIGH (ref 0.5–1.3)
EGFR: 40 ML/MIN/1.73M2 — LOW
GLUCOSE SERPL-MCNC: 121 MG/DL — HIGH (ref 70–99)
HCT VFR BLD CALC: 41 % — SIGNIFICANT CHANGE UP (ref 39–50)
HCV AB S/CO SERPL IA: 0.04 S/CO — SIGNIFICANT CHANGE UP
HCV AB SERPL-IMP: SIGNIFICANT CHANGE UP
HGB BLD-MCNC: 12.6 G/DL — LOW (ref 13–17)
MAGNESIUM SERPL-MCNC: 2.1 MG/DL — SIGNIFICANT CHANGE UP (ref 1.6–2.6)
MCHC RBC-ENTMCNC: 30.7 GM/DL — LOW (ref 32–36)
MCHC RBC-ENTMCNC: 32.6 PG — SIGNIFICANT CHANGE UP (ref 27–34)
MCV RBC AUTO: 106.2 FL — HIGH (ref 80–100)
NRBC # BLD: 0 /100 WBCS — SIGNIFICANT CHANGE UP (ref 0–0)
PHOSPHATE SERPL-MCNC: 3.5 MG/DL — SIGNIFICANT CHANGE UP (ref 2.5–4.5)
PLATELET # BLD AUTO: 185 K/UL — SIGNIFICANT CHANGE UP (ref 150–400)
POTASSIUM SERPL-MCNC: 4.9 MMOL/L — SIGNIFICANT CHANGE UP (ref 3.5–5.3)
POTASSIUM SERPL-SCNC: 4.9 MMOL/L — SIGNIFICANT CHANGE UP (ref 3.5–5.3)
RBC # BLD: 3.86 M/UL — LOW (ref 4.2–5.8)
RBC # FLD: 14.2 % — SIGNIFICANT CHANGE UP (ref 10.3–14.5)
SODIUM SERPL-SCNC: 142 MMOL/L — SIGNIFICANT CHANGE UP (ref 135–145)
WBC # BLD: 63.93 K/UL — CRITICAL HIGH (ref 3.8–10.5)
WBC # FLD AUTO: 63.93 K/UL — CRITICAL HIGH (ref 3.8–10.5)

## 2023-07-20 PROCEDURE — 99024 POSTOP FOLLOW-UP VISIT: CPT

## 2023-07-20 RX ORDER — LANOLIN ALCOHOL/MO/W.PET/CERES
5 CREAM (GRAM) TOPICAL AT BEDTIME
Refills: 0 | Status: DISCONTINUED | OUTPATIENT
Start: 2023-07-20 | End: 2023-07-21

## 2023-07-20 RX ADMIN — VALSARTAN 160 MILLIGRAM(S): 80 TABLET ORAL at 00:32

## 2023-07-20 RX ADMIN — Medication 1 APPLICATION(S): at 06:27

## 2023-07-20 RX ADMIN — Medication 88 MICROGRAM(S): at 06:27

## 2023-07-20 RX ADMIN — SIMVASTATIN 10 MILLIGRAM(S): 20 TABLET, FILM COATED ORAL at 22:55

## 2023-07-20 RX ADMIN — Medication 100 MILLIGRAM(S): at 00:32

## 2023-07-20 RX ADMIN — Medication 10 MILLIGRAM(S): at 14:07

## 2023-07-20 RX ADMIN — Medication 100 MILLIGRAM(S): at 06:27

## 2023-07-20 RX ADMIN — VALSARTAN 160 MILLIGRAM(S): 80 TABLET ORAL at 07:57

## 2023-07-20 RX ADMIN — Medication 1000 MILLIGRAM(S): at 18:35

## 2023-07-20 RX ADMIN — FINASTERIDE 5 MILLIGRAM(S): 5 TABLET, FILM COATED ORAL at 12:09

## 2023-07-20 RX ADMIN — Medication 1000 MILLIGRAM(S): at 18:06

## 2023-07-20 RX ADMIN — Medication 10 MILLIGRAM(S): at 18:57

## 2023-07-20 RX ADMIN — Medication 5 MILLIGRAM(S): at 22:55

## 2023-07-20 RX ADMIN — Medication 2000 UNIT(S): at 12:09

## 2023-07-20 RX ADMIN — SIMVASTATIN 10 MILLIGRAM(S): 20 TABLET, FILM COATED ORAL at 00:32

## 2023-07-20 RX ADMIN — Medication 300 MILLIGRAM(S): at 12:09

## 2023-07-20 RX ADMIN — Medication 10 MILLIGRAM(S): at 13:25

## 2023-07-20 NOTE — DISCHARGE NOTE PROVIDER - NSDCMRMEDTOKEN_GEN_ALL_CORE_FT
allopurinol 300 mg oral tablet: 1 tab(s) orally once a day  aspirin 81 mg oral tablet: 1 tab(s) orally once a day  calcium carbonate 500 mg (200 mg elemental calcium) oral tablet, chewable: 1 tab(s) orally 3 times a day, As needed, Heartburn  finasteride 5 mg oral tablet: 1 tab(s) orally once a day  levothyroxine 88 mcg (0.088 mg) oral tablet: 1 tab(s) orally once a day  metoprolol succinate 100 mg oral tablet, extended release: 1 tab(s) orally once a day  simvastatin 10 mg oral tablet: 1 tab(s) orally once a day (at bedtime)  triamcinolone 0.025% topical cream: Apply topically to affected area 3 times a day, As Needed - for rash  valsartan 160 mg oral tablet: 1 orally once a day  Vitamin B12: 1 tab(s) orally once a day  Vitamin D3 50 mcg (2000 intl units) oral tablet: 1 tab(s) orally once a day   acetaminophen 500 mg oral tablet: 2 tab(s) orally every 8 hours As needed Moderate Pain (4 - 6)  allopurinol 300 mg oral tablet: 1 tab(s) orally once a day  calcium carbonate 500 mg (200 mg elemental calcium) oral tablet, chewable: 1 tab(s) orally 3 times a day, As needed, Heartburn  enoxaparin: 40 milligram(s) subcutaneous once a day for DVT prevention while at rehab  finasteride 5 mg oral tablet: 1 tab(s) orally once a day  levothyroxine 88 mcg (0.088 mg) oral tablet: 1 tab(s) orally once a day  melatonin 5 mg oral tablet: 1 tab(s) orally once a day (at bedtime) as needed for  insomnia  metoprolol succinate 100 mg oral tablet, extended release: 1 tab(s) orally once a day  senna leaf extract oral tablet: 2 tab(s) orally once a day (at bedtime)  simvastatin 10 mg oral tablet: 1 tab(s) orally once a day (at bedtime)  triamcinolone 0.025% topical cream: Apply topically to affected area 3 times a day, As Needed - for rash  valsartan 160 mg oral tablet: 1 orally once a day  Vitamin B12: 1 tab(s) orally once a day  Vitamin D3 50 mcg (2000 intl units) oral tablet: 1 tab(s) orally once a day

## 2023-07-20 NOTE — DISCHARGE NOTE PROVIDER - HOSPITAL COURSE
HPI:  76 y/o male PMHx BPH s/p TURP 2022, CKD III, CLL, HTN, Hypothyroidism, Gout, Parkinson's misdiagnosis now completely weaned off sinemet, s/p cataract surgery bilateral and hemithyroidectomy, NPH presents for surgical intervention today after mild cognitive improvement and gait improvement after high volume LP.  He reports continued urinary frequency/incontinence, difficulty with memory and difficulty walking. He ambulates with walker or cane at home.    Hospital Course:  7/19: POD 0 s/p R VPS certas at 5.   7/20: POD#1 s/p R VPS Certas @5. FEMI overnight, off IVF, voiding, tolerating PO. Pend PT/OT.     Patient evaluated by PT/OT who recommended:  Patient is going home? rehab? hospice? Facility Name:     Hospital course c/b:     Exam on day of discharge:    Checklist:   - Obtained follow up appointment from NP  - Reviewed final recommendations of inpatient consults  - review discharge planning on provider handoff  - post op imaging completed  - Neurologically stable for discharge  - Vitals stable for discharge   - Afebrile for discharge  - WBC is stable  - Sodium level is normal  - Pain is adequately controlled  - Pt has PICC/walker/brace/collar   - LACE score (10 or > needs PCP apt)   - stroke patient? Discharge NIHSS score   HPI:  76 y/o male PMHx BPH s/p TURP 2022, CKD III, CLL, HTN, Hypothyroidism, Gout, Parkinson's misdiagnosis now completely weaned off sinemet, s/p cataract surgery bilateral and hemithyroidectomy, NPH presents for surgical intervention today after mild cognitive improvement and gait improvement after high volume LP.  He reports continued urinary frequency/incontinence, difficulty with memory and difficulty walking. He ambulates with walker or cane at home.    Hospital Course:  7/19: POD 0 s/p R VPS certas at 5.   7/20: POD#1 s/p R VPS Certas @5. FEMI overnight, off IVF, voiding, tolerating PO. Pend PT/OT.   7/21: POD 2, neuro stable, 5mg melatonin for bedtime      Patient evaluated by PT/OT who recommended: Acute Rehab  Patient is going to [facility]    Hospital course uncomplicated     Exam on day of discharge:  General: NAD, pt is comfortably sitting up in bed, A&O x3, on RA  HEENT: CN II-XII grossly intact, PERRL 3mm, EOMI b/l, face symmetric, tongue midline, neck FROM  Cardiovascular: RRR, normal S1 and S2   Respiratory: lungs CTAB, no wheezing, rhonchi, or crackles   GI: normoactive BS to auscultation, abd soft, NTND   Neuro: no aphasia, speech clear, no dysmetria, no pronator drift  strength 5/5 throughout all 4 extremities  sensation intact to light touch throughout   Extremities: distal pulses 2+ x4   Wound/incision: R frontal VPS incision w/ stapled dressing, abdominal incisions c/d/i    Patient is neuro stable, vitals stable, afebrile, medically ready for discharge

## 2023-07-20 NOTE — CHART NOTE - NSCHARTNOTEFT_GEN_A_CORE
POD#1 s/p R VPS Certas @5. FEMI overnight, off IVF, voiding, tolerating PO. Pend PT/OT. SBP elevated today in 150s-170s, 1x hydral given this afternoon.

## 2023-07-20 NOTE — DISCHARGE NOTE PROVIDER - NSDCCPCAREPLAN_GEN_ALL_CORE_FT
PRINCIPAL DISCHARGE DIAGNOSIS  Diagnosis: NPH (normal pressure hydrocephalus)  Assessment and Plan of Treatment: s/p VPS, Certas valve set at 5      SECONDARY DISCHARGE DIAGNOSES  Diagnosis: HTN (hypertension)  Assessment and Plan of Treatment:     Diagnosis: Hypothyroid  Assessment and Plan of Treatment:     Diagnosis: HLD (hyperlipidemia)  Assessment and Plan of Treatment:     Diagnosis: BPH (benign prostatic hyperplasia)  Assessment and Plan of Treatment:     Diagnosis: Gout  Assessment and Plan of Treatment:     Diagnosis: Chronic kidney disease (CKD)  Assessment and Plan of Treatment:      PRINCIPAL DISCHARGE DIAGNOSIS  Diagnosis: NPH (normal pressure hydrocephalus)  Assessment and Plan of Treatment: s/p VPS, Certas valve set at 5. Follow up with Dr. Kirby outpatient      SECONDARY DISCHARGE DIAGNOSES  Diagnosis: HTN (hypertension)  Assessment and Plan of Treatment: continue home Metoprolol, Valsartan    Diagnosis: Hypothyroid  Assessment and Plan of Treatment: continue home Synyhroid    Diagnosis: HLD (hyperlipidemia)  Assessment and Plan of Treatment: continue home Simvastatin    Diagnosis: BPH (benign prostatic hyperplasia)  Assessment and Plan of Treatment: continue home Finasteride    Diagnosis: Gout  Assessment and Plan of Treatment: continue home Allopurinol    Diagnosis: Chronic kidney disease (CKD)  Assessment and Plan of Treatment:

## 2023-07-20 NOTE — PHYSICAL THERAPY INITIAL EVALUATION ADULT - GENERAL OBSERVATIONS, REHAB EVAL
pt received/returned sitting in bedside chair +heplock, +tele, +cranial incision C/D/I, wife present, in NAD

## 2023-07-20 NOTE — PHYSICAL THERAPY INITIAL EVALUATION ADULT - MODALITIES TREATMENT COMMENTS
smile, cheek puff, eyebrow raise: symmetrical. tongue protrusion: midline. visual tracking (H test): smooth pursuit. visual field test (quadrant test): WNL B/L. no DDK noted. R handed

## 2023-07-20 NOTE — PHYSICAL THERAPY INITIAL EVALUATION ADULT - GAIT DEVIATIONS NOTED, PT EVAL
decreased ct/increased time in double stance/decreased step length/decreased weight-shifting ability

## 2023-07-20 NOTE — PHYSICAL THERAPY INITIAL EVALUATION ADULT - PERTINENT HX OF CURRENT PROBLEM, REHAB EVAL
76 y/o male PMHx BPH s/p TURP 2022, CKD III, CLL, HTN, Hypothyroidism, Gout, Parkinson's misdiagnosis now completely weaned off sinemet, worked up for NPH now s/p R VPS certas at 5

## 2023-07-20 NOTE — DISCHARGE NOTE PROVIDER - CARE PROVIDER_API CALL
Clayton Kirby  Neurosurgery  [address] [address]  Phone: [phone]  Fax: [phone]  Follow Up Time:     Luz Art   Surgery  [address], [address]  [place] [address]  Phone: [phone]  Fax: [phone]  Follow Up Time:

## 2023-07-20 NOTE — DISCHARGE NOTE PROVIDER - NSDCCPTREATMENT_GEN_ALL_CORE_FT
PRINCIPAL PROCEDURE  Procedure: Placement,  shunt, using frameless stereotaxy and electromagnetic navigation  Findings and Treatment:

## 2023-07-20 NOTE — PHYSICAL THERAPY INITIAL EVALUATION ADULT - ADDITIONAL COMMENTS
pt lives w/ his wife in an elevator access apt building. Ambulates w/ use of SC but also owns RW if needed. Denies hx of recent falls. no home health aide. states he was going to PT 3x/wk

## 2023-07-20 NOTE — DISCHARGE NOTE PROVIDER - NSDCFUADDAPPT_GEN_ALL_CORE_FT
Please follow up with Dr. Kirby    Please follow up with Dr. Art if needed from General Surgery    Please follow up with your primary care doctor  Please follow up with Dr. Kirby, call the office to make/confirm appointment at [phone]    Please follow up with Dr. Art if needed from General Surgery    Please follow up with your primary care doctor

## 2023-07-20 NOTE — DISCHARGE NOTE PROVIDER - NSDCFUADDINST_GEN_ALL_CORE_FT
Neurosurgery follow up appointment date/time:  - follow up in the office for a wound check and staple removal   - please call the office to confirm appointment: [phone]    Wound Care:  - shower and wash hair daily with shampoo  - pat dry incision with a clean towel   - leave incision uncovered, open to air   - no picking at incision      Devices:  - VPS, Certas valve set at 5  - Cane for ambulation         Activity:  - fatigue is common after surgery, rest if you feel tired   - no bending, lifting, twisting or heavy lifting   - walking is recommended, ambulate as tolerated  - you may shower when you get home, keep your incision dry  - no soaking in a tub/pool/hot tub   - no driving within 24 hours of anesthesia or while taking prescription pain medications   - keep hydrated, drink plenty of water     Inpatient consults:  - final recommendations  - you will need follow up with....    Please also follow up with your primary care doctor.     Pain Expectations:  - pain after surgery is expected  - please take pain meds as prescribed     Medications:  - changes to home meds (ex. AED's)?  - new meds?  - pain meds?  - when can antiplatelets or anticoagulants be restarted?  - were adverse affects of meds discussed with patients?   - pain medications can cause constipation, you should eat a high fiber diet and may take a stool softener while on pain meds   - Avoid taking Advil (ibuprofen), Motrin (naproxen), or Aspirin for pain as they can cause bleeding     Call the office or come to ED if:  - wound has drainage or bleeding, increased redness or pain at incision site, neurological change, fever (>101), chills, night sweats, syncope, nausea/vomiting, chest pain, shortness of breath      Playback:  - See playback health for a copy of your discharge paperwork     WITHIN 24 HOURS OF DISCHARGE, PLEASE CONTACT NEURO PA  WITH ANY QUESTIONS OR CONCERNS: [phone]   OTHERWISE, PLEASE CALL THE OFFICE WITH ANY QUESTIONS OR CONCERNS: [phone] Neurosurgery follow up appointment date/time:  - follow up in the office for a wound check and suture/staple removal   - please call the office to confirm appointment: [phone]    Wound Care:  - shower and wash hair daily with shampoo  - pat dry incision with a clean towel   - leave incision uncovered, open to air   - no picking at incision    Devices:  - VPS, Certas valve set at 5  - Cane for ambulation     Activity:  - fatigue is common after surgery, rest if you feel tired   - no bending, lifting, twisting or heavy lifting   - walking is recommended, ambulate as tolerated  - you may shower when you get home, keep your incision dry  - no soaking in a tub/pool/hot tub   - no driving within 24 hours of anesthesia or while taking prescription pain medications   - keep hydrated, drink plenty of water     Please also follow up with your primary care doctor.     Pain Expectations:  - pain after surgery is expected  - please take pain meds as prescribed     Medications:  - HOLD home Aspirin   - otherwise continue home meds as prescribed: Allopurinol, Finasteride, Levothyroxine, Metoprolol, Simvastatin, Valsartan   - new meds: Lovenox 40mg daily while at rehab for DVT prevention while at rehab (can cause bruising/bleeding)   - pain meds: Tylenol 650mg every 6 hours as needed for pain   - when can antiplatelets or anticoagulants be restarted?  - adverse affects of meds discussed with patient  - pain medications can cause constipation, you should eat a high fiber diet and may take a stool softener while on pain meds   - Avoid taking Advil (ibuprofen), Motrin (naproxen), or Aspirin for pain as they can cause bleeding     Call the office or come to ED if:  - wound has drainage or bleeding, increased redness or pain at incision site, neurological change, fever (>101), chills, night sweats, syncope, nausea/vomiting, chest pain, shortness of breath      Playback:  - See playback health for a copy of your discharge paperwork     WITHIN 24 HOURS OF DISCHARGE, PLEASE CONTACT NEURO PA  WITH ANY QUESTIONS OR CONCERNS: [phone]   OTHERWISE, PLEASE CALL THE OFFICE WITH ANY QUESTIONS OR CONCERNS: [phone] Neurosurgery follow up:  - follow up in the office for a wound check and suture/staple removal   - please call the office to confirm appointment: [phone]    Wound Care:  - shower and wash hair daily with shampoo  - pat dry incision with a clean towel   - leave incision uncovered, open to air   - no picking at incision    Devices:  - VPS, Certas valve set at 5  - Cane for ambulation     Activity:  - fatigue is common after surgery, rest if you feel tired   - no bending, lifting, twisting or heavy lifting   - walking is recommended, ambulate as tolerated  - you may shower when you get home, keep your incision dry  - no soaking in a tub/pool/hot tub   - no driving within 24 hours of anesthesia or while taking prescription pain medications   - keep hydrated, drink plenty of water     Please also follow up with your primary care doctor.     Pain Expectations:  - pain after surgery is expected  - please take pain meds as prescribed     Medications:  - HOLD home Aspirin   - otherwise continue home meds as prescribed: Allopurinol, Finasteride, Levothyroxine, Metoprolol, Simvastatin, Valsartan   - new meds: Lovenox 40mg daily while at rehab for DVT prevention while at rehab (can cause bruising/bleeding)   - pain meds: Tylenol 650mg every 6 hours as needed for pain   - when can antiplatelets or anticoagulants be restarted?  - adverse affects of meds discussed with patient  - pain medications can cause constipation, you should eat a high fiber diet and may take a stool softener while on pain meds   - Avoid taking Advil (ibuprofen), Motrin (naproxen), or Aspirin for pain as they can cause bleeding     Call the office or come to ED if:  - wound has drainage or bleeding, increased redness or pain at incision site, neurological change, fever (>101), chills, night sweats, syncope, nausea/vomiting, chest pain, shortness of breath      Playback:  - See playback health for a copy of your discharge paperwork     WITHIN 24 HOURS OF DISCHARGE, PLEASE CONTACT NEURO PA  WITH ANY QUESTIONS OR CONCERNS: [phone]   OTHERWISE, PLEASE CALL THE OFFICE WITH ANY QUESTIONS OR CONCERNS: [phone]

## 2023-07-20 NOTE — PHYSICAL THERAPY INITIAL EVALUATION ADULT - MD ORDER
Placement,  shunt, using frameless stereotaxy and electromagnetic navigation 19-Jul-2023 07:50:43  Lelo Araiza

## 2023-07-21 ENCOUNTER — TRANSCRIPTION ENCOUNTER (OUTPATIENT)
Age: 78
End: 2023-07-21

## 2023-07-21 ENCOUNTER — INPATIENT (INPATIENT)
Facility: HOSPITAL | Age: 78
LOS: 7 days | Discharge: HOME HEALTH | DRG: 949 | End: 2023-07-29
Attending: PHYSICAL MEDICINE & REHABILITATION | Admitting: PHYSICAL MEDICINE & REHABILITATION
Payer: MEDICARE

## 2023-07-21 VITALS
HEART RATE: 61 BPM | DIASTOLIC BLOOD PRESSURE: 88 MMHG | OXYGEN SATURATION: 96 % | TEMPERATURE: 98 F | SYSTOLIC BLOOD PRESSURE: 180 MMHG | RESPIRATION RATE: 16 BRPM | WEIGHT: 237.66 LBS

## 2023-07-21 VITALS
SYSTOLIC BLOOD PRESSURE: 160 MMHG | DIASTOLIC BLOOD PRESSURE: 86 MMHG | OXYGEN SATURATION: 96 % | TEMPERATURE: 98 F | RESPIRATION RATE: 17 BRPM | HEART RATE: 67 BPM

## 2023-07-21 DIAGNOSIS — N18.30 CHRONIC KIDNEY DISEASE, STAGE 3 UNSPECIFIED: ICD-10-CM

## 2023-07-21 DIAGNOSIS — Z87.891 PERSONAL HISTORY OF NICOTINE DEPENDENCE: ICD-10-CM

## 2023-07-21 DIAGNOSIS — I13.0 HYPERTENSIVE HEART AND CHRONIC KIDNEY DISEASE WITH HEART FAILURE AND STAGE 1 THROUGH STAGE 4 CHRONIC KIDNEY DISEASE, OR UNSPECIFIED CHRONIC KIDNEY DISEASE: ICD-10-CM

## 2023-07-21 DIAGNOSIS — G91.1 OBSTRUCTIVE HYDROCEPHALUS: ICD-10-CM

## 2023-07-21 DIAGNOSIS — Z98.2 PRESENCE OF CEREBROSPINAL FLUID DRAINAGE DEVICE: ICD-10-CM

## 2023-07-21 DIAGNOSIS — Z95.810 PRESENCE OF AUTOMATIC (IMPLANTABLE) CARDIAC DEFIBRILLATOR: ICD-10-CM

## 2023-07-21 DIAGNOSIS — Z48.811 ENCOUNTER FOR SURGICAL AFTERCARE FOLLOWING SURGERY ON THE NERVOUS SYSTEM: ICD-10-CM

## 2023-07-21 DIAGNOSIS — I25.10 ATHEROSCLEROTIC HEART DISEASE OF NATIVE CORONARY ARTERY WITHOUT ANGINA PECTORIS: ICD-10-CM

## 2023-07-21 DIAGNOSIS — F41.9 ANXIETY DISORDER, UNSPECIFIED: ICD-10-CM

## 2023-07-21 DIAGNOSIS — R41.3 OTHER AMNESIA: ICD-10-CM

## 2023-07-21 DIAGNOSIS — Z98.52 VASECTOMY STATUS: Chronic | ICD-10-CM

## 2023-07-21 DIAGNOSIS — N40.0 BENIGN PROSTATIC HYPERPLASIA WITHOUT LOWER URINARY TRACT SYMPTOMS: ICD-10-CM

## 2023-07-21 DIAGNOSIS — M10.9 GOUT, UNSPECIFIED: ICD-10-CM

## 2023-07-21 DIAGNOSIS — F03.90 UNSPECIFIED DEMENTIA WITHOUT BEHAVIORAL DISTURBANCE: ICD-10-CM

## 2023-07-21 DIAGNOSIS — E89.0 POSTPROCEDURAL HYPOTHYROIDISM: Chronic | ICD-10-CM

## 2023-07-21 DIAGNOSIS — Z98.890 OTHER SPECIFIED POSTPROCEDURAL STATES: Chronic | ICD-10-CM

## 2023-07-21 DIAGNOSIS — E78.5 HYPERLIPIDEMIA, UNSPECIFIED: ICD-10-CM

## 2023-07-21 DIAGNOSIS — Z51.89 ENCOUNTER FOR OTHER SPECIFIED AFTERCARE: ICD-10-CM

## 2023-07-21 DIAGNOSIS — E03.9 HYPOTHYROIDISM, UNSPECIFIED: ICD-10-CM

## 2023-07-21 DIAGNOSIS — C91.10 CHRONIC LYMPHOCYTIC LEUKEMIA OF B-CELL TYPE NOT HAVING ACHIEVED REMISSION: ICD-10-CM

## 2023-07-21 DIAGNOSIS — R32 UNSPECIFIED URINARY INCONTINENCE: ICD-10-CM

## 2023-07-21 DIAGNOSIS — I50.20 UNSPECIFIED SYSTOLIC (CONGESTIVE) HEART FAILURE: ICD-10-CM

## 2023-07-21 PROBLEM — G91.2 (IDIOPATHIC) NORMAL PRESSURE HYDROCEPHALUS: Chronic | Status: ACTIVE | Noted: 2023-07-18

## 2023-07-21 LAB
ANION GAP SERPL CALC-SCNC: 9 MMOL/L — SIGNIFICANT CHANGE UP (ref 5–17)
BUN SERPL-MCNC: 30 MG/DL — HIGH (ref 7–23)
CALCIUM SERPL-MCNC: 8.7 MG/DL — SIGNIFICANT CHANGE UP (ref 8.4–10.5)
CHLORIDE SERPL-SCNC: 110 MMOL/L — HIGH (ref 96–108)
CO2 SERPL-SCNC: 22 MMOL/L — SIGNIFICANT CHANGE UP (ref 22–31)
CREAT SERPL-MCNC: 1.59 MG/DL — HIGH (ref 0.5–1.3)
EGFR: 45 ML/MIN/1.73M2 — LOW
GLUCOSE SERPL-MCNC: 123 MG/DL — HIGH (ref 70–99)
HCT VFR BLD CALC: 40.1 % — SIGNIFICANT CHANGE UP (ref 39–50)
HGB BLD-MCNC: 12.5 G/DL — LOW (ref 13–17)
MAGNESIUM SERPL-MCNC: 2 MG/DL — SIGNIFICANT CHANGE UP (ref 1.6–2.6)
MCHC RBC-ENTMCNC: 31.2 GM/DL — LOW (ref 32–36)
MCHC RBC-ENTMCNC: 32.3 PG — SIGNIFICANT CHANGE UP (ref 27–34)
MCV RBC AUTO: 103.6 FL — HIGH (ref 80–100)
NRBC # BLD: 0 /100 WBCS — SIGNIFICANT CHANGE UP (ref 0–0)
PHOSPHATE SERPL-MCNC: 3.2 MG/DL — SIGNIFICANT CHANGE UP (ref 2.5–4.5)
PLATELET # BLD AUTO: 153 K/UL — SIGNIFICANT CHANGE UP (ref 150–400)
POTASSIUM SERPL-MCNC: 4.5 MMOL/L — SIGNIFICANT CHANGE UP (ref 3.5–5.3)
POTASSIUM SERPL-SCNC: 4.5 MMOL/L — SIGNIFICANT CHANGE UP (ref 3.5–5.3)
RBC # BLD: 3.87 M/UL — LOW (ref 4.2–5.8)
RBC # FLD: 14.4 % — SIGNIFICANT CHANGE UP (ref 10.3–14.5)
SODIUM SERPL-SCNC: 141 MMOL/L — SIGNIFICANT CHANGE UP (ref 135–145)
WBC # BLD: 54.61 K/UL — CRITICAL HIGH (ref 3.8–10.5)
WBC # FLD AUTO: 54.61 K/UL — CRITICAL HIGH (ref 3.8–10.5)

## 2023-07-21 PROCEDURE — C1729: CPT

## 2023-07-21 PROCEDURE — 86803 HEPATITIS C AB TEST: CPT

## 2023-07-21 PROCEDURE — C1889: CPT

## 2023-07-21 PROCEDURE — 86901 BLOOD TYPING SEROLOGIC RH(D): CPT

## 2023-07-21 PROCEDURE — 83880 ASSAY OF NATRIURETIC PEPTIDE: CPT

## 2023-07-21 PROCEDURE — 86850 RBC ANTIBODY SCREEN: CPT

## 2023-07-21 PROCEDURE — 80048 BASIC METABOLIC PNL TOTAL CA: CPT

## 2023-07-21 PROCEDURE — 99221 1ST HOSP IP/OBS SF/LOW 40: CPT

## 2023-07-21 PROCEDURE — 83735 ASSAY OF MAGNESIUM: CPT

## 2023-07-21 PROCEDURE — C1887: CPT

## 2023-07-21 PROCEDURE — 85027 COMPLETE CBC AUTOMATED: CPT

## 2023-07-21 PROCEDURE — 97162 PT EVAL MOD COMPLEX 30 MIN: CPT

## 2023-07-21 PROCEDURE — 86900 BLOOD TYPING SEROLOGIC ABO: CPT

## 2023-07-21 PROCEDURE — C1713: CPT

## 2023-07-21 PROCEDURE — 85730 THROMBOPLASTIN TIME PARTIAL: CPT

## 2023-07-21 PROCEDURE — C1769: CPT

## 2023-07-21 PROCEDURE — 85610 PROTHROMBIN TIME: CPT

## 2023-07-21 PROCEDURE — 84100 ASSAY OF PHOSPHORUS: CPT

## 2023-07-21 PROCEDURE — 70450 CT HEAD/BRAIN W/O DYE: CPT

## 2023-07-21 PROCEDURE — 36415 COLL VENOUS BLD VENIPUNCTURE: CPT

## 2023-07-21 RX ORDER — LANOLIN ALCOHOL/MO/W.PET/CERES
1 CREAM (GRAM) TOPICAL
Qty: 0 | Refills: 0 | DISCHARGE
Start: 2023-07-21

## 2023-07-21 RX ORDER — ACETAMINOPHEN 500 MG
975 TABLET ORAL EVERY 8 HOURS
Refills: 0 | Status: DISCONTINUED | OUTPATIENT
Start: 2023-07-21 | End: 2023-07-29

## 2023-07-21 RX ORDER — ENOXAPARIN SODIUM 100 MG/ML
40 INJECTION SUBCUTANEOUS
Refills: 0 | Status: DISCONTINUED | OUTPATIENT
Start: 2023-07-21 | End: 2023-07-21

## 2023-07-21 RX ORDER — VALSARTAN 80 MG/1
160 TABLET ORAL DAILY
Refills: 0 | Status: DISCONTINUED | OUTPATIENT
Start: 2023-07-21 | End: 2023-07-29

## 2023-07-21 RX ORDER — FINASTERIDE 5 MG/1
5 TABLET, FILM COATED ORAL DAILY
Refills: 0 | Status: DISCONTINUED | OUTPATIENT
Start: 2023-07-21 | End: 2023-07-29

## 2023-07-21 RX ORDER — ENOXAPARIN SODIUM 100 MG/ML
40 INJECTION SUBCUTANEOUS
Qty: 0 | Refills: 0 | DISCHARGE
Start: 2023-07-21

## 2023-07-21 RX ORDER — SIMVASTATIN 20 MG/1
10 TABLET, FILM COATED ORAL AT BEDTIME
Refills: 0 | Status: DISCONTINUED | OUTPATIENT
Start: 2023-07-21 | End: 2023-07-29

## 2023-07-21 RX ORDER — METOPROLOL TARTRATE 50 MG
100 TABLET ORAL DAILY
Refills: 0 | Status: DISCONTINUED | OUTPATIENT
Start: 2023-07-21 | End: 2023-07-29

## 2023-07-21 RX ORDER — ALLOPURINOL 300 MG
300 TABLET ORAL DAILY
Refills: 0 | Status: DISCONTINUED | OUTPATIENT
Start: 2023-07-21 | End: 2023-07-29

## 2023-07-21 RX ORDER — ENOXAPARIN SODIUM 100 MG/ML
40 INJECTION SUBCUTANEOUS
Refills: 0 | Status: DISCONTINUED | OUTPATIENT
Start: 2023-07-21 | End: 2023-07-29

## 2023-07-21 RX ORDER — ACETAMINOPHEN 500 MG
2 TABLET ORAL
Qty: 0 | Refills: 0 | DISCHARGE
Start: 2023-07-21

## 2023-07-21 RX ORDER — LEVOTHYROXINE SODIUM 125 MCG
88 TABLET ORAL DAILY
Refills: 0 | Status: DISCONTINUED | OUTPATIENT
Start: 2023-07-21 | End: 2023-07-29

## 2023-07-21 RX ORDER — LANOLIN ALCOHOL/MO/W.PET/CERES
6 CREAM (GRAM) TOPICAL AT BEDTIME
Refills: 0 | Status: DISCONTINUED | OUTPATIENT
Start: 2023-07-21 | End: 2023-07-28

## 2023-07-21 RX ORDER — SENNA PLUS 8.6 MG/1
2 TABLET ORAL
Qty: 0 | Refills: 0 | DISCHARGE
Start: 2023-07-21

## 2023-07-21 RX ORDER — SENNA PLUS 8.6 MG/1
2 TABLET ORAL AT BEDTIME
Refills: 0 | Status: DISCONTINUED | OUTPATIENT
Start: 2023-07-21 | End: 2023-07-29

## 2023-07-21 RX ORDER — ASPIRIN/CALCIUM CARB/MAGNESIUM 324 MG
1 TABLET ORAL
Qty: 0 | Refills: 0 | DISCHARGE

## 2023-07-21 RX ORDER — CHOLECALCIFEROL (VITAMIN D3) 125 MCG
2000 CAPSULE ORAL DAILY
Refills: 0 | Status: DISCONTINUED | OUTPATIENT
Start: 2023-07-21 | End: 2023-07-29

## 2023-07-21 RX ADMIN — Medication 1 APPLICATION(S): at 06:35

## 2023-07-21 RX ADMIN — Medication 88 MICROGRAM(S): at 06:35

## 2023-07-21 RX ADMIN — Medication 100 MILLIGRAM(S): at 06:36

## 2023-07-21 RX ADMIN — Medication 1000 MILLIGRAM(S): at 17:38

## 2023-07-21 RX ADMIN — VALSARTAN 160 MILLIGRAM(S): 80 TABLET ORAL at 09:31

## 2023-07-21 RX ADMIN — VALSARTAN 160 MILLIGRAM(S): 80 TABLET ORAL at 22:13

## 2023-07-21 RX ADMIN — Medication 2000 UNIT(S): at 12:29

## 2023-07-21 RX ADMIN — FINASTERIDE 5 MILLIGRAM(S): 5 TABLET, FILM COATED ORAL at 12:29

## 2023-07-21 RX ADMIN — Medication 300 MILLIGRAM(S): at 12:29

## 2023-07-21 RX ADMIN — Medication 6 MILLIGRAM(S): at 22:15

## 2023-07-21 RX ADMIN — Medication 1000 MILLIGRAM(S): at 18:38

## 2023-07-21 RX ADMIN — SIMVASTATIN 10 MILLIGRAM(S): 20 TABLET, FILM COATED ORAL at 22:13

## 2023-07-21 RX ADMIN — ENOXAPARIN SODIUM 40 MILLIGRAM(S): 100 INJECTION SUBCUTANEOUS at 22:14

## 2023-07-21 NOTE — DISCHARGE NOTE NURSING/CASE MANAGEMENT/SOCIAL WORK - NSDCFUADDAPPT_GEN_ALL_CORE_FT
Please follow up with Dr. Kirby, call the office to make/confirm appointment at [phone]    Please follow up with Dr. Art if needed from General Surgery    Please follow up with your primary care doctor

## 2023-07-21 NOTE — H&P ADULT - NSHPPHYSICALEXAM_GEN_ALL_CORE
PHYSICAL EXAM  VITALS  T(C): 36.8 (07-21-23 @ 16:18), Max: 37 (07-20-23 @ 21:54)  HR: 67 (07-21-23 @ 16:18) (67 - 85)  BP: 160/86 (07-21-23 @ 16:18) (142/67 - 168/79)  RR: 17 (07-21-23 @ 16:18) (15 - 17)  SpO2: 96% (07-21-23 @ 16:18) (94% - 97%)    Gen - NAD, Comfortable  HEENT - NCAT, EOMI, MMM  Neck - Supple, No limited ROM  Pulm - CTAB, No wheeze, No rhonchi, No crackles  Cardiovascular - RRR, S1S2  Chest - good chest expansion, good respiratory effort  Abdomen - Soft, NT/ND, +BS  Extremities - No Cyanosis, no clubbing, no edema, no calf tenderness  Neuro-     Cognitive - awake, alert, oriented to person, place, date,.  Able  to follow command     Communication - Fluent, Comprehensible     Attention: Intact,     Judgement: Good evidence of judgement     Memory: Recall 3 objects immediate, 0/3 three min later, Recent memory impaired     Cranial Nerves - CN 2-12 intact.      Motor - No focal deficits.                     LEFT    UE - ShAB 5/5, EF 5/5, EE 5/5,  5/5                    RIGHT UE - ShAB 5/5, EF 5/5, EE 5/5,   5/5                    LEFT    LE - HF 5/5, KE 5/5, DF 5/5, PF 5/5                    RIGHT LE - HF 5/5, KE 5/5, DF 5/5, PF 5/5        Sensory - Intact to LT     Reflexes - DTR Intact     Coordination - FTN intact      Tone - normal  Psychiatric - Mood stable, Affect WNL  Skin:  right cranial VPS incision with staples CORNELL, abd incision sites x 2 with steristrips, periumbilical ecchymosis. PHYSICAL EXAM  VITALS  T(C): 36.8 (07-21-23 @ 16:18), Max: 37 (07-20-23 @ 21:54)  HR: 67 (07-21-23 @ 16:18) (67 - 85)  BP: 160/86 (07-21-23 @ 16:18) (142/67 - 168/79)  RR: 17 (07-21-23 @ 16:18) (15 - 17)  SpO2: 96% (07-21-23 @ 16:18) (94% - 97%)    Gen - NAD, Comfortable  HEENT - NCAT, EOMI, MMM. right fronto-parietal coma-shaped stapled surgical incision c/d/i  Neck - Supple, No limited ROM  Pulm - CTAB, No wheeze, No rhonchi, No crackles  Cardiovascular - RRR, S1S2  Chest - good chest expansion, good respiratory effort  Abdomen - Soft, NT/ND, +BS  Extremities - No Cyanosis, no clubbing, no edema, no calf tenderness  Neuro-     Cognitive - awake, alert, oriented to person, place, date,.  Able  to follow command     Communication - Fluent, Comprehensible     Attention: Intact,     Judgement: Good evidence of judgement     Memory: Recall 3 objects immediate, 0/3 three min later, Recent memory impaired     Cranial Nerves - CN 2-12 intact.      Motor - No focal deficits.                     LEFT    UE - ShAB 5/5, EF 5/5, EE 5/5,  5/5                    RIGHT UE - ShAB 5/5, EF 5/5, EE 5/5,   5/5                    LEFT    LE - HF 5/5, KE 5/5, DF 5/5, PF 5/5                    RIGHT LE - HF 5/5, KE 5/5, DF 5/5, PF 5/5        Sensory - Intact to LT     Reflexes - DTR Intact     Coordination - FTN intact      Tone - normal  Psychiatric - Mood stable, Affect WNL  Skin:  right fronto-parietal coma-shaped stapled surgical incision c/d/i CORNELL, abd incision sites x 2 with steri-strips, periumbilical ecchymosis.

## 2023-07-21 NOTE — OCCUPATIONAL THERAPY INITIAL EVALUATION ADULT - DIAGNOSIS, OT EVAL
Pt presents with impaired BUE coordination, static and dynamic standing balance, and functional endurance impacting his ability to independently complete ADLs, functional transfers, and functional mobility.

## 2023-07-21 NOTE — PATIENT PROFILE ADULT - VISION (WITH CORRECTIVE LENSES IF THE PATIENT USUALLY WEARS THEM):
Pt wears  glasses for nearsighted due to cataract b/l. Pt does not remember the exact date of surgery./Normal vision: sees adequately in most situations; can see medication labels, newsprint

## 2023-07-21 NOTE — H&P ADULT - NSICDXPASTSURGICALHX_GEN_ALL_CORE_FT
PAST SURGICAL HISTORY:  History of basal cell carcinoma excision 2021    History of partial thyroidectomy 2012, benign nodule    History of prostate surgery aquablation    History of vasectomy age 47    
Abdominal Pain, N/V/D

## 2023-07-21 NOTE — DISCHARGE NOTE NURSING/CASE MANAGEMENT/SOCIAL WORK - NSDCPEFALRISK_GEN_ALL_CORE
For information on Fall & Injury Prevention, visit: [email or web address] OR  [email or web address] OR  https://www.cdc.gov/steadi/patient.html

## 2023-07-21 NOTE — H&P ADULT - NSHPSOCIALHISTORY_GEN_ALL_CORE
Smoking - Denied  EtOH - Denied   Drugs - Denied     Lives   PTA: Independent in ADLs and ambulation     CURRENT FUNCTIONAL STATUS  Bed Mobility:   Transfers:   Gait: Smoking - Denied  EtOH - Former, quit in 1984, smoked 2 ppd for 22yrs  Drugs - Denied     Pt lives w/ his wife in an elevator access apt building. Ambulates w/ use of SC but also owns RW if needed. Denies hx of recent falls. no home health aide. states he was going to PT 3x/wk    CURRENT FUNCTIONAL STATUS  PT 7/20  Sit-stand: CG/min A straight cane  Gait: CG/min A straight cane 75ft x 2 Smoking - Denied  EtOH - Former, quit in 1984, smoked 2 ppd for 22yrs  Drugs - Denied     Pt lives w/ his wife in an elevator access apt building. Ambulates w/ use of SC but also owns RW if needed. Denies hx of recent falls. no home health aide. states he was going to PT 3x/wk    CURRENT FUNCTIONAL STATUS  PT 7/20  Sit-stand: CG/min A straight cane  Gait: CG/min A straight cane 75ft x 2    OT 7/20  Toilet tx: CG  UBD: Min A  LBD: CG

## 2023-07-21 NOTE — DISCHARGE NOTE NURSING/CASE MANAGEMENT/SOCIAL WORK - PATIENT PORTAL LINK FT
You can access the FollowMyHealth Patient Portal offered by [facility] by registering at the following website: [email or web address]. By joining [employer]’s FollowMyHealth portal, you will also be able to view your health information using other applications (apps) compatible with our system.

## 2023-07-21 NOTE — H&P ADULT - NS ATTEND AMEND GEN_ALL_CORE FT
I independently performed the documented the history, exam, and medical decision making. I have made amendments to the documentation where necessary. I have personally seen and examined the patient. Medical records were reviewed and I have made amendments to the documentation where necessary and adjusted the history, physical examination, and plan as documented by the Nurse Practitioner and Resident Physician. Patient was seen and evaluated at bedside today. Reported no overnight events and is in no acute distress. Eager to participate on the recommended rehabilitation program. Denies any CP, SOB, DESIR, palpitations, fever, chills, body aches, cough, congestion, or any other symptoms at this time. Admission vitals, labs, and physical exam are outlined below.    Vital Signs Last 24 Hrs  T(C): 36.6 (22 Jul 2023 07:38), Max: 36.8 (21 Jul 2023 10:01)  T(F): 97.9 (22 Jul 2023 07:38), Max: 98.2 (21 Jul 2023 10:01)  HR: 65 (22 Jul 2023 07:38) (61 - 82)  BP: 155/90 (22 Jul 2023 07:38) (142/67 - 180/88)  BP(mean): 96 (21 Jul 2023 10:55) (96 - 114)  RR: 16 (22 Jul 2023 07:38) (15 - 17)  SpO2: 94% (22 Jul 2023 07:38) (94% - 97%)      LAB                        12.8   56.95 )-----------( 154      ( 22 Jul 2023 07:50 )             41.6     07-22    141  |  106  |  25<H>  ----------------------------<  104<H>  4.2   |  26  |  1.38<H>    Ca    9.3      22 Jul 2023 07:50  Phos  3.2     07-21  Mg     2.0     07-21    TPro  6.4  /  Alb  3.4  /  TBili  0.6  /  DBili  x   /  AST  16  /  ALT  6<L>  /  AlkPhos  102  07-22    LIVER FUNCTIONS - ( 22 Jul 2023 07:50 )  Alb: 3.4 g/dL / Pro: 6.4 g/dL / ALK PHOS: 102 U/L / ALT: 6 U/L / AST: 16 U/L / GGT: x             PHYSICAL EXAM  Gen - NAD, Comfortable  HEENT - NCAT, EOMI, MMM. right fronto-parietal coma-shaped stapled surgical incision c/d/i  Neck - Supple, No limited ROM  Pulm - CTAB, No wheeze, No rhonchi, No crackles  Cardiovascular - RRR, S1S2  Chest - good chest expansion, good respiratory effort  Abdomen - Soft, NT/ND, +BS  Extremities - No Cyanosis, no clubbing, no edema, no calf tenderness  Neuro-     Cognitive - awake, alert, oriented to person, place, date,.  Able  to follow command     Communication - Fluent, Comprehensible     Attention: Intact,     Judgement: Good evidence of judgement     Memory: Recall 3 objects immediate, 0/3 three min later, Recent memory impaired     Cranial Nerves - CN 2-12 intact.      Motor - No focal deficits.                     LEFT    UE - ShAB 5/5, EF 5/5, EE 5/5,  5/5                    RIGHT UE - ShAB 5/5, EF 5/5, EE 5/5,   5/5                    LEFT    LE - HF 5/5, KE 5/5, DF 5/5, PF 5/5                    RIGHT LE - HF 5/5, KE 5/5, DF 5/5, PF 5/5        Sensory - Intact to LT     Reflexes - DTR Intact     Coordination - FTN intact      Tone - normal  Psychiatric - Mood stable, Affect WNL  Skin:  right fronto-parietal coma-shaped stapled surgical incision c/d/i CORNELL, abd incision sites x 2 with steri-strips, periumbilical ecchymosis. I independently performed the documented the history, exam, and medical decision making. I have made amendments to the documentation where necessary. I have personally seen and examined the patient. Medical records were reviewed and I have made amendments to the documentation where necessary and adjusted the history, physical examination, and plan as documented by the Nurse Practitioner and Resident Physician. Patient was seen and evaluated at bedside today. Reported no overnight events and is in no acute distress. Lymphocytosis that is likely due to CLL.  Motivated to participate on the recommended rehabilitation program. Denies any CP, SOB, DESIR, palpitations, fever, chills, body aches, cough, congestion, or any other symptoms at this time. Admission vitals, labs, and physical exam are outlined below.    Vital Signs Last 24 Hrs  T(C): 36.6 (22 Jul 2023 07:38), Max: 36.8 (21 Jul 2023 10:01)  T(F): 97.9 (22 Jul 2023 07:38), Max: 98.2 (21 Jul 2023 10:01)  HR: 65 (22 Jul 2023 07:38) (61 - 82)  BP: 155/90 (22 Jul 2023 07:38) (142/67 - 180/88)  BP(mean): 96 (21 Jul 2023 10:55) (96 - 114)  RR: 16 (22 Jul 2023 07:38) (15 - 17)  SpO2: 94% (22 Jul 2023 07:38) (94% - 97%)      LAB                        12.8   56.95 )-----------( 154      ( 22 Jul 2023 07:50 )             41.6     07-22    141  |  106  |  25<H>  ----------------------------<  104<H>  4.2   |  26  |  1.38<H>    Ca    9.3      22 Jul 2023 07:50  Phos  3.2     07-21  Mg     2.0     07-21    TPro  6.4  /  Alb  3.4  /  TBili  0.6  /  DBili  x   /  AST  16  /  ALT  6<L>  /  AlkPhos  102  07-22    LIVER FUNCTIONS - ( 22 Jul 2023 07:50 )  Alb: 3.4 g/dL / Pro: 6.4 g/dL / ALK PHOS: 102 U/L / ALT: 6 U/L / AST: 16 U/L / GGT: x             PHYSICAL EXAM  Gen - NAD, Comfortable  HEENT - NCAT, EOMI, MMM. right fronto-parietal coma-shaped stapled surgical incision c/d/i  Neck - Supple, No limited ROM  Pulm - CTAB, No wheeze, No rhonchi, No crackles  Cardiovascular - RRR, S1S2  Chest - good chest expansion, good respiratory effort  Abdomen - Soft, NT/ND, +BS  Extremities - No Cyanosis, no clubbing, no edema, no calf tenderness  Neuro-     Cognitive - awake, alert, oriented to person, place, date,.  Able  to follow command     Communication - Fluent, Comprehensible     Attention: Intact,     Judgement: Good evidence of judgement     Memory: Recall 3 objects immediate, 0/3 three min later, Recent memory impaired     Cranial Nerves - CN 2-12 intact.      Motor - No focal deficits.                     LEFT    UE - ShAB 5/5, EF 5/5, EE 5/5,  5/5                    RIGHT UE - ShAB 5/5, EF 5/5, EE 5/5,   5/5                    LEFT    LE - HF 5/5, KE 5/5, DF 5/5, PF 5/5                    RIGHT LE - HF 5/5, KE 5/5, DF 5/5, PF 5/5        Sensory - Intact to LT     Reflexes - DTR Intact     Coordination - FTN intact      Tone - normal  Psychiatric - Mood stable, Affect WNL  Skin:  right fronto-parietal coma-shaped stapled surgical incision c/d/i CORNELL, abd incision sites x 2 with steri-strips, periumbilical ecchymosis.

## 2023-07-21 NOTE — CONSULT NOTE ADULT - TIME BILLING
Review of hospital course, labs, vitals, medical records.   Bedside exam and interview    Discussed plan of care with primary team   Documenting the encounter

## 2023-07-21 NOTE — OCCUPATIONAL THERAPY INITIAL EVALUATION ADULT - GENERAL OBSERVATIONS, REHAB EVAL
OT IE Completed. Pt's LAUREL Gandara cleared pt for OT. Pt received semisupine in bed, +tele, +heplock, +cranial staples C/D/I, NAD, room air, agreeable to OT. Pt tolerated session well. Pt left seated in transport chair with transport present, LAUREL Gandara aware.

## 2023-07-21 NOTE — OCCUPATIONAL THERAPY INITIAL EVALUATION ADULT - MANUAL MUSCLE TESTING RESULTS, REHAB EVAL
BUE shoulder flexion 4+/5, BUE elbow flexion/extension 5/5, BUE  strength 4-/5. BLE knee flexion/extension grossly 5/5, BLE ankle DF/PF 5/5.

## 2023-07-21 NOTE — H&P ADULT - NSHPLABSRESULTS_GEN_ALL_CORE
RECENT LABS/IMAGING                        12.5   54.61 )-----------( 153      ( 21 Jul 2023 06:34 )             40.1     07-21    141  |  110<H>  |  30<H>  ----------------------------<  123<H>  4.5   |  22  |  1.59<H>    Ca    8.7      21 Jul 2023 06:34  Phos  3.2     07-21  Mg     2.0     07-21        Urinalysis Basic - ( 21 Jul 2023 06:34 )    Color: x / Appearance: x / SG: x / pH: x  Gluc: 123 mg/dL / Ketone: x  / Bili: x / Urobili: x   Blood: x / Protein: x / Nitrite: x   Leuk Esterase: x / RBC: x / WBC x   Sq Epi: x / Non Sq Epi: x / Bacteria: x    < from: CT Head No Cont (07.19.23 @ 12:15) >    Impression:    Status post placement of the right frontal approach ventricular shunt   catheter with stable ventricular caliber. Postoperative changes including   trace right parafalcine subdural hematoma.    < end of copied text >    < from: Xray Chest 2 Views PA/Lat (06.02.23 @ 09:17) >    IMPRESSION:  No acute radiographic findings    < end of copied text >    < from: MR Head No Cont (05.11.23 @ 14:09) >    IMPRESSION:  Ventricular dilatation with prominence of bilateral sylvian fissures and   narrow callosal angle, suggesting normal pressure hydrocephalus in the   appropriate clinical setting versus central atrophy.    < end of copied text >

## 2023-07-21 NOTE — OCCUPATIONAL THERAPY INITIAL EVALUATION ADULT - ADDITIONAL COMMENTS
Pt lives with his wife in a house with 2STE. Pt reports that prior to admission, pt was mostly independent in all ADLs, requires assist from wife for cooking, cleaning, and laundry. Pt was able to drive himself to outpatient PT 3x/wk. Pt ambulates with SC inside the house, RW for longer distances. Pt has a bathtub shower with grab bars. Pt also owns a bedside commode but does not use. Pt has a higher toilet at home. Pt reports approx 3-4 falls in the past 6 months.

## 2023-07-21 NOTE — H&P ADULT - NSHPREVIEWOFSYSTEMS_GEN_ALL_CORE
REVIEW OF SYSTEMS  Constitutional: No fever, No Chills, No fatigue  HEENT: No eye pain, No visual disturbances, No difficulty hearing  Pulm: No cough,  No shortness of breath  Cardio: No chest pain, No palpitations  GI:  No abdominal pain, No nausea, No vomiting, No diarrhea, No constipation,   : No dysuria, No frequency, No hematuria, + incontinence  Neuro: No headaches,  +memory loss, + loss of strength, no numbness, No tremors  Skin: No itching, No rashes, No lesions   Endo: No temperature intolerance  MSK: No joint pain, No joint swelling, No muscle pain, No Neck pain,  No back pain  Psych:  No depression, No anxiety Constitutional: No fever, No Chills, No fatigue  HEENT: No eye pain, No visual disturbances, No difficulty hearing  Pulm: No cough,  No shortness of breath  Cardio: No chest pain, No palpitations  GI:  No abdominal pain, No nausea, No vomiting, No diarrhea, No constipation,   : No dysuria, No frequency, No hematuria, + incontinence  Neuro: No headaches,  +memory loss, + loss of strength, no numbness, No tremors  Skin: No itching, No rashes, No lesions   Endo: No temperature intolerance  MSK: No joint pain, No joint swelling, No muscle pain, No Neck pain,  No back pain  Psych:  No depression, No anxiety

## 2023-07-21 NOTE — OCCUPATIONAL THERAPY INITIAL EVALUATION ADULT - MODIFIED CLINICAL TEST OF SENSORY INTEGRATION IN BALANCE TEST
Pt performed functional mobility in bedroom to bathroom with SC and out in hallway with CGA. Pt with ~2 lateral LOB requiring CGA to recover. Pt with shuffling gait noted and kyphotic posture, pt with forward weight shifting at times requiring CGA in order to avoid falling forwards.

## 2023-07-21 NOTE — PROGRESS NOTE ADULT - SUBJECTIVE AND OBJECTIVE BOX
HPI:  Taken from neurosurgery note on 6/16/23 "77 year RH male with PMhx HLD, BPH, HLD, CLL presenting for high volume LP.  Patient was misdiagnosed with Parkinson's disease about 6/7 yrs ago. About 8  yrs ago, pt was walking on cobblestone and fell; this led to be seen by  neurology. Vadim scan done on 1/23/2023 which was normal. Pt was started and  titrating up to Sinemet 25/100 5 x a day; he has slowly decreased to TID. He  has noticed no changes with decreasing the dose.  On exam, posture is moderately stooped, with some shortening of gait stride. Pt  requires pushing up to stand.  Postural reflexes are impaired. No other Parkinsonian features.  Pt states he experiences urinary urgency and frequency. Pt gets up q 2- 4 hrs  to use the restroom. Pt had an ablation of his prostate in Nov 2022. Pt is  currently on Finasteride 5 mg and Tamsulosin 0.4 mg  MRI brain done showing dilated ventricles suggestive of normal pressure  hydrocephalus.  "    78 y/o male PMHx BPH s/p TURP 2022, CKD III, CLL, HTN, Hypothyroidism, Gout, Parkinson's misdiagnosis now completely weaned off sinemet, s/p cataract surgery bilateral and hemithyroidectomy, NPH presents for surgical intervention today after mild cognitive improvement and gait improvement after high volume LP.  He reports continued urinary frequency/incontinence, difficulty with memory and difficulty walking. He ambulates with walker or cane at home. (19 Jul 2023 06:28)    OVERNIGHT EVENTS: POD 2, neuro stable, 5mg melatonin for bedtime    Hospital course:   7/19: POD 0 s/p R VPS certas at 5.   7/20: POD#1 s/p R VPS Certas @5. FEMI overnight, off IVF, voiding, tolerating PO. Pend PT/OT. SBP elevated today in 150s-170s, 1x hydral given this afternoon.   7/21: POD 2, neuro stable, 5mg melatonin for bedtime      Vital Signs Last 24 Hrs  T(C): 37 (20 Jul 2023 21:54), Max: 37.3 (20 Jul 2023 16:51)  T(F): 98.6 (20 Jul 2023 21:54), Max: 99.2 (20 Jul 2023 16:51)  HR: 86 (20 Jul 2023 20:27) (66 - 86)  BP: 140/72 (20 Jul 2023 20:27) (135/63 - 178/91)  BP(mean): 100 (20 Jul 2023 20:27) (90 - 125)  RR: 17 (20 Jul 2023 20:27) (17 - 19)  SpO2: 94% (20 Jul 2023 20:27) (91% - 98%)    Parameters below as of 20 Jul 2023 20:27  Patient On (Oxygen Delivery Method): room air        I&O's Summary    19 Jul 2023 07:01  -  20 Jul 2023 07:00  --------------------------------------------------------  IN: 600 mL / OUT: 1600 mL / NET: -1000 mL    20 Jul 2023 07:01  -  21 Jul 2023 00:27  --------------------------------------------------------  IN: 720 mL / OUT: 200 mL / NET: 520 mL        PHYSICAL EXAM:  General: NAD, pt is comfortably sitting up in bed, A&O x3, on RA  HEENT: CN II-XII grossly intact, PERRL 3mm, EOMI b/l, face symmetric, tongue midline, neck FROM  Cardiovascular: RRR, normal S1 and S2   Respiratory: lungs CTAB, no wheezing, rhonchi, or crackles   GI: normoactive BS to auscultation, abd soft, NTND   Neuro: no aphasia, speech clear, no dysmetria, no pronator drift  strength 5/5 throughout all 4 extremities  sensation intact to light touch throughout   Extremities: distal pulses 2+ x4   Wound/incision: R frontal VPS incision w/ stapled dressing, abdominal incisions c/d/i    TUBES/LINES:  [] Weinberg  [] A-line  [] Lumbar Drain  [] Wound Drains  [] NGT   [] EVD   [] CVC  [] Other      DIET:  [] NPO  [x] Mechanical  [] Tube feeds    LABS:                        12.6   63.93 )-----------( 185      ( 20 Jul 2023 12:43 )             41.0     07-20    142  |  109<H>  |  29<H>  ----------------------------<  121<H>  4.9   |  21<L>  |  1.74<H>    Ca    9.0      20 Jul 2023 12:43  Phos  3.5     07-20  Mg     2.1     07-20      PT/INR - ( 19 Jul 2023 11:06 )   PT: 12.0 sec;   INR: 1.01          PTT - ( 19 Jul 2023 11:06 )  PTT:31.6 sec  Urinalysis Basic - ( 20 Jul 2023 12:43 )    Color: x / Appearance: x / SG: x / pH: x  Gluc: 121 mg/dL / Ketone: x  / Bili: x / Urobili: x   Blood: x / Protein: x / Nitrite: x   Leuk Esterase: x / RBC: x / WBC x   Sq Epi: x / Non Sq Epi: x / Bacteria: x          CAPILLARY BLOOD GLUCOSE          Drug Levels: [] N/A    CSF Analysis: [] N/A      Allergies    No Known Allergies    Intolerances        Home Medications:  allopurinol 300 mg oral tablet: 1 tab(s) orally once a day (19 Jul 2023 06:30)  aspirin 81 mg oral tablet: 1 tab(s) orally once a day (19 Jul 2023 06:30)  calcium carbonate 500 mg (200 mg elemental calcium) oral tablet, chewable: 1 tab(s) orally 3 times a day, As needed, Heartburn (19 Jul 2023 06:18)  finasteride 5 mg oral tablet: 1 tab(s) orally once a day (19 Jul 2023 06:30)  levothyroxine 88 mcg (0.088 mg) oral tablet: 1 tab(s) orally once a day (19 Jul 2023 06:18)  metoprolol succinate 100 mg oral tablet, extended release: 1 tab(s) orally once a day (19 Jul 2023 06:18)  simvastatin 10 mg oral tablet: 1 tab(s) orally once a day (at bedtime) (19 Jul 2023 06:18)  triamcinolone 0.025% topical cream: Apply topically to affected area 3 times a day, As Needed - for rash (19 Jul 2023 06:30)  valsartan 160 mg oral tablet: 1 orally once a day (19 Jul 2023 06:18)  Vitamin B12: 1 tab(s) orally once a day (19 Jul 2023 06:30)  Vitamin D3 50 mcg (2000 intl units) oral tablet: 1 tab(s) orally once a day (19 Jul 2023 06:30)      MEDICATIONS:  MEDICATIONS  (STANDING):  allopurinol 300 milliGRAM(s) Oral daily  cholecalciferol 2000 Unit(s) Oral daily  finasteride 5 milliGRAM(s) Oral daily  hydrocortisone 1% Cream 1 Application(s) Topical two times a day  levothyroxine 88 MICROGram(s) Oral daily  melatonin 5 milliGRAM(s) Oral at bedtime  metoprolol succinate  milliGRAM(s) Oral daily  senna 2 Tablet(s) Oral at bedtime  simvastatin 10 milliGRAM(s) Oral at bedtime  valsartan 160 milliGRAM(s) Oral daily    MEDICATIONS  (PRN):  acetaminophen     Tablet .. 1000 milliGRAM(s) Oral every 8 hours PRN Moderate Pain (4 - 6)  hydrALAZINE Injectable 10 milliGRAM(s) IV Push every 4 hours PRN SBP > 140  labetalol Injectable 10 milliGRAM(s) IV Push every 2 hours PRN Systolic blood pressure > 140 mmHg  ondansetron Injectable 4 milliGRAM(s) IV Push every 6 hours PRN Nausea and/or Vomiting      CULTURES:      RADIOLOGY & ADDITIONAL TESTS:      ASSESSMENT:  78 y/o male PMHx BPH s/p TURP 2022, CKD III, CLL, HTN, Hypothyroidism, Gout, Parkinson's misdiagnosis now completely weaned off sinemet, worked up for NPH now s/p R VPS certas at 5 (7/19).     PLAN:  Neuro  -neuro/vitals q4h  -pain control as needed  -post op CTH done    Cardio  --140    Pulm  -RA  -incentive spirometry    GI  -ADAT  -bowel regimen  -last BM 7/19     Renal  -IVL   -normonatremia goal     Heme  -f/u post op labs    Endo  -no acitve issues    ID  -post op Ancef    admit to stepdown, full code, d/w Dr. Kirby , [place]      DVT PROPHYLAXIS:  [x] Venodynes                                [x] Heparin/Lovenox      Assessment: present when checked     [] GCS   E   V   M     Heart Failure: [] Acute, [] acute on chronic, [] chronic   Heart Failure: [] Diastolic (HFpEF), [] Systolic (HRrEF), [] Combined (HFpEF and HFrEF), [] RHF, [] Pulm HTN, [] Other     [] BRAEDEN, [] ATN, [] AIN, [] other   [] CKD1, [] CKD2, [] CKD3, [] CKD4, [] CKD5, [] ESRD     Encephalopathy: [] Metabolic, [] Hepatic, [] Toxic, [] Neurological, [] Other     Abnormal Nutritional Status: [] malnutrition (see nutrition note), []underweight: BMI <19, [] morbid obesity: BMI >40, [] Cachexia     [] Sepsis   [] Hypovolemic shock, [] Cardiogenic shock, [] Hemorrhagic shock, [] Neurogenic shock   [] Acute respiratory failure   [] Cerebral edema, [] Brain compression / herniation   [] Functional quadriplegia   [] Acute blood loss anemia 
HPI:  Taken from neurosurgery note on 6/16/23 "77 year RH male with PMhx HLD, BPH, HLD, CLL presenting for high volume LP.  Patient was misdiagnosed with Parkinson's disease about 6/7 yrs ago. About 8  yrs ago, pt was walking on cobblestone and fell; this led to be seen by  neurology. Vadim scan done on 1/23/2023 which was normal. Pt was started and  titrating up to Sinemet 25/100 5 x a day; he has slowly decreased to TID. He  has noticed no changes with decreasing the dose.  On exam, posture is moderately stooped, with some shortening of gait stride. Pt  requires pushing up to stand.  Postural reflexes are impaired. No other Parkinsonian features.  Pt states he experiences urinary urgency and frequency. Pt gets up q 2- 4 hrs  to use the restroom. Pt had an ablation of his prostate in Nov 2022. Pt is  currently on Finasteride 5 mg and Tamsulosin 0.4 mg  MRI brain done showing dilated ventricles suggestive of normal pressure  hydrocephalus.  "    76 y/o male PMHx BPH s/p TURP 2022, CKD III, CLL, HTN, Hypothyroidism, Gout, Parkinson's misdiagnosis now completely weaned off sinemet, s/p cataract surgery bilateral and hemithyroidectomy, NPH presents for surgical intervention today after mild cognitive improvement and gait improvement after high volume LP.  He reports continued urinary frequency/incontinence, difficulty with memory and difficulty walking. He ambulates with walker or cane at home. (19 Jul 2023 06:28)    HOSPITAL COURSE:   7/19: POD 0 s/p R VPS certas at 5.   7/20: POD#1 s/p R VPS Certas @5. FEMI overnight, off IVF, voiding, tolerating PO. Pend PT/OT.     OVERNIGHT EVENTS:  Vital Signs Last 24 Hrs  T(C): 37.1 (19 Jul 2023 22:20), Max: 37.1 (19 Jul 2023 22:20)  T(F): 98.7 (19 Jul 2023 22:20), Max: 98.7 (19 Jul 2023 22:20)  HR: 74 (20 Jul 2023 00:45) (64 - 88)  BP: 141/66 (20 Jul 2023 00:45) (141/66 - 212/97)  BP(mean): 95 (20 Jul 2023 00:45) (95 - 139)  RR: 18 (20 Jul 2023 00:45) (14 - 19)  SpO2: 91% (20 Jul 2023 00:45) (91% - 96%)    Parameters below as of 20 Jul 2023 00:45  Patient On (Oxygen Delivery Method): room air        I&O's Summary    19 Jul 2023 07:01  -  20 Jul 2023 03:44  --------------------------------------------------------  IN: 600 mL / OUT: 1200 mL / NET: -600 mL        PHYSICAL EXAM:  General: NAD, pt is comfortably sitting up in bed, A&O x3, on RA  HEENT: CN II-XII grossly intact, PERRL 3mm, EOMI b/l, face symmetric, tongue midline, neck FROM  Cardiovascular: RRR, normal S1 and S2   Respiratory: lungs CTAB, no wheezing, rhonchi, or crackles   GI: normoactive BS to auscultation, abd soft, NTND   Neuro: no aphasia, speech clear, no dysmetria, no pronator drift  strength 5/5 throughout all 4 extremities  sensation intact to light touch throughout   Extremities: distal pulses 2+ x4   Wound/incision: R frontal VPS incision w/ stapled dressing, abdominal incisions w/ steri-strip    TUBES/LINES:  [] Weinberg  [] Lumbar Drain  [] Wound Drains  [] Others    DIET:  [] NPO  [X] Mechanical  [] Tube feeds    LABS:                        13.0   37.39 )-----------( 139      ( 19 Jul 2023 11:05 )             41.3       Phos  3.8     07-19  Mg     2.0     07-19      PT/INR - ( 19 Jul 2023 11:06 )   PT: 12.0 sec;   INR: 1.01          PTT - ( 19 Jul 2023 11:06 )  PTT:31.6 sec        CAPILLARY BLOOD GLUCOSE          Drug Levels: [] N/A    CSF Analysis: [] N/A      Allergies    No Known Allergies    Intolerances      MEDICATIONS:  Antibiotics:    Neuro:  acetaminophen     Tablet .. 1000 milliGRAM(s) Oral every 8 hours PRN  ondansetron Injectable 4 milliGRAM(s) IV Push every 6 hours PRN    Anticoagulation:    OTHER:  allopurinol 300 milliGRAM(s) Oral daily  finasteride 5 milliGRAM(s) Oral daily  hydrALAZINE Injectable 10 milliGRAM(s) IV Push every 4 hours PRN  hydrocortisone 1% Cream 1 Application(s) Topical two times a day  labetalol Injectable 10 milliGRAM(s) IV Push every 2 hours PRN  levothyroxine 88 MICROGram(s) Oral daily  metoprolol succinate  milliGRAM(s) Oral daily  senna 2 Tablet(s) Oral at bedtime  simvastatin 10 milliGRAM(s) Oral at bedtime  valsartan 160 milliGRAM(s) Oral daily    IVF:  cholecalciferol 2000 Unit(s) Oral daily    CULTURES:    RADIOLOGY & ADDITIONAL TESTS:      ASSESSMENT:  76 y/o male PMHx BPH s/p TURP 2022, CKD III, CLL, HTN, Hypothyroidism, Gout, Parkinson's misdiagnosis now completely weaned off sinemet, worked up for NPH now s/p R VPS certas at 5 (7/19).         G91.2    Family history of heart attack (Father)    Handoff    MEWS Score    HTN (hypertension)    Gout    HLD (hyperlipidemia)    BPH (Benign Prostatic Hyperplasia)    Basal cell carcinoma    Parkinson's disease    Enlarged prostate with lower urinary tract symptoms (LUTS)    History of 2019 novel coronavirus disease (COVID-19)    COVID-19 vaccine series completed    GERD (gastroesophageal reflux disease)    Proteinuria    CLL (chronic lymphocytic leukemia)    Glaucoma    Dry eyes    NPH (normal pressure hydrocephalus)    Hypothyroidism    NPH (normal pressure hydrocephalus)    NPH (normal pressure hydrocephalus)    Placement,  shunt, using frameless stereotaxy and electromagnetic navigation    No Past Surgical History    History of basal cell carcinoma excision    History of partial thyroidectomy    History of vasectomy    History of prostate surgery    [email or web address]        PLAN:  Neuro  -neuro/vitals q4h  -pain control as needed  -post op CTH done  -PT/OT     Cardio  --140    Pulm  -RA  -incentive spirometry    GI  -ADAT  -bowel regimen  -last BM 7/19     Renal  -IVL   -normonatremia goal     Heme  -f/u post op labs    Endo  -no acitve issues    ID  -post op Ancef    admit to stepdown, full code, d/w Dr. Kirby   
Subjective:  NEUROSURGERY POC:  77 male disgnosed with NPH causing ataxic gait and intermittent urinary incontinence. Now  s/p day zero  shunt placement - CERTAS at 5.   There were no intra op complications. Post operative CT scan shows catheter in good position, no significant hematoma.   Patient in PACU comfortable in no distress  . He had a large BM       T(C): 36.2 (07-19-23 @ 09:59), Max: 36.2 (07-19-23 @ 07:30)  HR: 67 (07-19-23 @ 12:40) (64 - 82)  BP: 166/85 (07-19-23 @ 12:40) (146/78 - 212/97)  RR: 18 (07-19-23 @ 12:40) (14 - 19)  SpO2: 95% (07-19-23 @ 12:40) (92% - 96%)  Wt(kg): --    Exam:  Well developed male, well groomed in no apparent distress.  A&O x 3, PERRL. EOMI  Vision : Baseline blurred vision near, clear vision far secondary to cataracts surgery few months ago.  Lung: Good air entry, clear lung sound.  Heart: S1S2 regular  Abd: Soft, non tender, +BS. laparoscopic puncture hole x 3 are drt y and clean.  Ext: No focal motor deficit. 5/5 x 4  No sensory deficit to touch.  CBC Full  -  ( 19 Jul 2023 11:05 )  WBC Count : 37.39 K/uL  RBC Count : 3.97 M/uL  Hemoglobin : 13.0 g/dL  Hematocrit : 41.3 %  Platelet Count - Automated : 139 K/uL  Mean Cell Volume : 104.0 fl  Mean Cell Hemoglobin : 32.7 pg  Mean Cell Hemoglobin Concentration : 31.5 gm/dL  Auto Neutrophil # : x  Auto Lymphocyte # : x  Auto Monocyte # : x  Auto Eosinophil # : x  Auto Basophil # : x  Auto Neutrophil % : x  Auto Lymphocyte % : x  Auto Monocyte % : x  Auto Eosinophil % : x  Auto Basophil % : x      Phos  3.8     07-19  Mg     2.0     07-19      PT/INR - ( 19 Jul 2023 11:06 )   PT: 12.0 sec;   INR: 1.01          PTT - ( 19 Jul 2023 11:06 )  PTT:31.6 sec      Wound: scalp wound is dry and clean  Abd. puncture hoes are dry and clean.     Imaging: CT scan: Findings:    Status post placement of right frontal approach ventricular shunt   catheter entering via a right frontal jamia hole. The catheter tip   terminates within the right frontal horn. Ventricles are stable in   caliber from prior exam. There is a shunt reservoir within the right   frontal scalp. The visualized portions of the distal lumbar intact. There   is right scalp swelling and cutaneous emphysema secondary to postsurgical   changes. There is pneumocephalus along the right frontal convexity   secondary postsurgical changes. There is interval development of the   small focal right parafalcine subdural hematoma measuring up to 0.4 cm in   diameter secondary postsurgical changes.    There is no midline shift.. There is no interval demarcated territorial   infarction.    Right frontal jamia hole is noted. There is no acute calvarial fracture..    The paranasal sinuses and bilateral mastoid complexes are well aerated.    Impression:    Status post placement of the right frontal approach ventricular shunt   catheter with stable ventricular caliber. Postoperative changes including   trace right parafalcine subdural hematoma.    Assessment: 77 s/p  shunt placement on 07/19/2023.  Plan:  - Transfer to telemetry - 8LA.  - Neuro check Q4hrs  - PT/OT evaluation.  - Advance diet as tolerated.  - Pain control.  KIM Kirby.

## 2023-07-21 NOTE — OCCUPATIONAL THERAPY INITIAL EVALUATION ADULT - PHYSICAL ASSIST/NONPHYSICAL ASSIST: SIT/SUPINE, REHAB EVAL
at end of session pt impulsively climbing into bed by lifting his foot directly facing the bed instead of sitting first. Decreased safety awareness noted./verbal cues/nonverbal cues (demo/gestures)/1 person assist

## 2023-07-21 NOTE — CONSULT NOTE ADULT - ASSESSMENT
78 y/o male PMHx BPH s/p TURP 2022, CKD stage 3, CLL, HTN, Hypothyroidism, Gout, Parkinson's misdiagnosis now completely weaned off sinemet, worked up for NPH now s/p R VPS certas at 5 (7/19).       #NPH  - s/p R VPS certas at 5 (7/19)  - plan per Neurosurgery team  - PT rec [facility], family considering outpatient PT    #CLL  #Leukocytosis  - WBC baseline in 40s, currently 50s-60s likely some component of reactive to procedure  - c/w outpatient heme/onc f/u    #CKD stage 3  - Cr baseline ~1.5, GFR ~40-45  - stable  - renally dose medications    #HTN  - c/w home Valsartan and Toprol    #Hypothyroid  - c/w home synthroid 88mcg    #Gout  - c/w home allopurinol 300mg, confirmed as patient's outpatient dose with stable renal function    Dispo: Rec for [facility], family may prefer home

## 2023-07-21 NOTE — PATIENT PROFILE ADULT - FUNCTIONAL ASSESSMENT - BASIC MOBILITY 6.
3-calculated by average/Not able to assess (calculate score using [facility] averaging method)

## 2023-07-21 NOTE — H&P ADULT - ASSESSMENT
Assessment/Plan:  CHA GONZALEZ is a 77y with ****.  Hospital Course complicated by ***. Patient now admitted for a multidisciplinary rehab program. 07-21-23 @ 15:03        Comprehensive Multidisciplinary Rehab Program:  - Start comprehensive rehab program of PT/OT/SLP - 3 hours a day, 5 days a week. P&O as needed       HTN  -   - Monitor BP    HLD  - cont statin    T2DM  - Lantus  - Premeal  - SSI  - Monitor fingersticks    Pain  - Tylenol PRN  - Avoid sedating medications that may interfere with cognitive recovery    Mood / Cognition  - Neuropsychology consult  - [ ] Enhanced Supervision  [ ] Constant Observation    Sleep  - Maintain quiet hours and a low stim environment.   - Monitor sleep logs (for BIU)  - Melatonin PRN     GI / Bowel  - Senna qHS  - Miralax PRN Daily  - GI ppx: ***     / Bladder  - Currently patient voids:      [ ] independent      [ ] external collection device (condom cath)      [ ] Indwelling blancas catheter      [ ] Intermittent catheterization  - Continue bladder scans Q8 hours with straight cath for >400cc.  - Toileting schedule every 4 hours    Skin / Pressure injury  - Skin assessment on admission performed [  ] :   - Monitor Incisions:    - Turn q2 hours in bed while awake, air mattress  - nursing to monitor skin qShift  - soft heel protectors  - skin barrier cream PRN    Diet/Dysphagia:  - Diet Consistency: ***  - Supplements: ***  - Aspiration Precautions  - SLP consult for swallow function evaluation and treatment  - Nutrition consult    DVT prophylaxis:   - *****  - SCDs  - Last Doppler on ***       Outpatient Follow-up:  ** Specialty and Name of physician      Code Status/Emergency Contact:      ---------------    Goals: Safe discharge to home  Estimated Length of Stay: 10-14 days  Rehab Potential: Good  Medical Prognosis: Good  Estimated Disposition: Home with home care      PRESCREEN COMPARISON:  I have reviewed the prescreen information and I have found no relevant changes between the preadmission screening and my post admission evaluation.    RATIONALE FOR INPATIENT ADMISSION: Patient demonstrates the following:  [X] Medically appropriate for rehabilitation admission  [X] Has attainable rehab goals with an appropriate initial discharge plan  [X]Has rehabilitation potential (expected to make a significant improvement within a reasonable period of time)  [X] Requires close medical management by a rehab physician, rehab nursing care, Hospitalist and comprehensive interdisciplinary team (including PT, OT and/or SLP, Prosthetics and Orthotics)     Assessment/Plan:  CHA GONZALEZ is a 77y with ****.  Hospital Course complicated by ***. Patient now admitted for a multidisciplinary rehab program. 07-21-23 @ 15:03    77M with PMH of CAD/MI S/p PCI x 2 6/2022, dementia, HTN, HLD, Anxiety, CHF w/ EF 25-30 / V Fib Arrest S/p AICD, Renal Ca S/p Lap Rt Radical, BPH s/p TURP 2022, CKD III, CLL, Hypothyroidism, Gout, Parkinson's misdiagnosis now completely weaned off sinemet, and NPH presented to [facility] on 7/19 for VPS placement. He c/o gait impairment, difficulty with memory, and urinary frequency/incontinence. MRI brain done showing dilated ventricles suggestive of normal pressure hydrocephalus. He had and elective high volume LP on 7/14 with improvement in his cognition and gait. He ambulates with walker or cane at home. He underwent VPS placement on 7/19. Remainder of hospital course uncomplicated. Patient evaluated by PT/OT/PM&R and recommended for acute inpatient rehab. Patient is medically stable for discharge to [facility] on 7/21.    Comprehensive Multidisciplinary Rehab Program:  - Start comprehensive rehab program of PT/OT/SLP - 3 hours a day, 5 days a week. P&O as needed       HTN  -   - Monitor BP    HLD  - cont statin    T2DM  - Lantus  - Premeal  - SSI  - Monitor fingersticks    Pain  - Tylenol PRN  - Avoid sedating medications that may interfere with cognitive recovery    Mood / Cognition  - Neuropsychology consult  - [ ] Enhanced Supervision  [ ] Constant Observation    Sleep  - Maintain quiet hours and a low stim environment.   - Monitor sleep logs (for BIU)  - Melatonin PRN     GI / Bowel  - Senna qHS  - Miralax PRN Daily  - GI ppx: ***     / Bladder  - Currently patient voids:      [ ] independent      [ ] external collection device (condom cath)      [ ] Indwelling blancas catheter      [ ] Intermittent catheterization  - Continue bladder scans Q8 hours with straight cath for >400cc.  - Toileting schedule every 4 hours    Skin / Pressure injury  - Skin assessment on admission performed [  ] :   - Monitor Incisions:    - Turn q2 hours in bed while awake, air mattress  - nursing to monitor skin qShift  - soft heel protectors  - skin barrier cream PRN    Diet/Dysphagia:  - Diet Consistency: ***  - Supplements: ***  - Aspiration Precautions  - SLP consult for swallow function evaluation and treatment  - Nutrition consult    DVT prophylaxis:   - *****  - SCDs  - Last Doppler on ***       Outpatient Follow-up:  ** Specialty and Name of physician      Code Status/Emergency Contact:      ---------------    Goals: Safe discharge to home  Estimated Length of Stay: 10-14 days  Rehab Potential: Good  Medical Prognosis: Good  Estimated Disposition: Home with home care      PRESCREEN COMPARISON:  I have reviewed the prescreen information and I have found no relevant changes between the preadmission screening and my post admission evaluation.    RATIONALE FOR INPATIENT ADMISSION: Patient demonstrates the following:  [X] Medically appropriate for rehabilitation admission  [X] Has attainable rehab goals with an appropriate initial discharge plan  [X]Has rehabilitation potential (expected to make a significant improvement within a reasonable period of time)  [X] Requires close medical management by a rehab physician, rehab nursing care, Hospitalist and comprehensive interdisciplinary team (including PT, OT and/or SLP, Prosthetics and Orthotics)     Assessment/Plan:  CHA GONZALEZ is a 77y with  PMH of CAD/MI S/p PCI x 2 6/2022, dementia, HTN, HLD, Anxiety, CHF w/ EF 25-30 / V Fib Arrest S/p AICD, Renal Ca S/p Lap Rt Radical, BPH s/p TURP 2022, CKD III, CLL, Hypothyroidism, Gout, Parkinson's misdiagnosis now completely weaned off sinemet, and NPH presented to [facility] on 7/19 for gait impairment, urinary incontinence, and cognitive impairments, found to have NPH, s/p VPS placement 7/19. Patient now admitted for a multidisciplinary rehab program. 07-21-23 @ 15:03    77M with PMH of CAD/MI S/p PCI x 2 6/2022, dementia, HTN, HLD, Anxiety, CHF w/ EF 25-30 / V Fib Arrest S/p AICD, Renal Ca S/p Lap Rt Radical, BPH s/p TURP 2022, CKD III, CLL, Hypothyroidism, Gout, Parkinson's misdiagnosis now completely weaned off sinemet, and NPH presented to [facility] on 7/19 for VPS placement. He c/o gait impairment, difficulty with memory, and urinary frequency/incontinence. MRI brain done showing dilated ventricles suggestive of normal pressure hydrocephalus. He had and elective high volume LP on 7/14 with improvement in his cognition and gait. He ambulates with walker or cane at home. He underwent VPS placement on 7/19. Remainder of hospital course uncomplicated. Patient evaluated by PT/OT/PM&R and recommended for acute inpatient rehab. Patient is medically stable for discharge to [facility] on 7/21.    Comprehensive Multidisciplinary Rehab Program:  - Start comprehensive rehab program of PT/OT/SLP - 3 hours a day, 5 days a week. P&O as needed       HTN  -   - Monitor BP    HLD  - cont statin    T2DM  - Lantus  - Premeal  - SSI  - Monitor fingersticks    Pain  - Tylenol PRN  - Avoid sedating medications that may interfere with cognitive recovery    Mood / Cognition  - Neuropsychology consult  - [ ] Enhanced Supervision  [ ] Constant Observation    Sleep  - Maintain quiet hours and a low stim environment.   - Monitor sleep logs (for BIU)  - Melatonin PRN     GI / Bowel  - Senna qHS  - Miralax PRN Daily  - GI ppx: ***     / Bladder  - Currently patient voids:      [ ] independent      [ ] external collection device (condom cath)      [ ] Indwelling blancas catheter      [ ] Intermittent catheterization  - Continue bladder scans Q8 hours with straight cath for >400cc.  - Toileting schedule every 4 hours    Skin / Pressure injury  - Skin assessment on admission performed [  ] :   - Monitor Incisions:    - Turn q2 hours in bed while awake, air mattress  - nursing to monitor skin qShift  - soft heel protectors  - skin barrier cream PRN    Diet/Dysphagia:  - Diet Consistency: ***  - Supplements: ***  - Aspiration Precautions  - SLP consult for swallow function evaluation and treatment  - Nutrition consult    DVT prophylaxis:   - *****  - SCDs  - Last Doppler on ***       Outpatient Follow-up:  ** Specialty and Name of physician      Code Status/Emergency Contact:      ---------------    Goals: Safe discharge to home  Estimated Length of Stay: 10-14 days  Rehab Potential: Good  Medical Prognosis: Good  Estimated Disposition: Home with home care      PRESCREEN COMPARISON:  I have reviewed the prescreen information and I have found no relevant changes between the preadmission screening and my post admission evaluation.    RATIONALE FOR INPATIENT ADMISSION: Patient demonstrates the following:  [X] Medically appropriate for rehabilitation admission  [X] Has attainable rehab goals with an appropriate initial discharge plan  [X]Has rehabilitation potential (expected to make a significant improvement within a reasonable period of time)  [X] Requires close medical management by a rehab physician, rehab nursing care, Hospitalist and comprehensive interdisciplinary team (including PT, OT and/or SLP, Prosthetics and Orthotics)     Assessment/Plan:  CHA GONZALEZ is a 77y with  PMH of CAD/MI S/p PCI x 2 6/2022, dementia, HTN, HLD, Anxiety, CHF w/ EF 25-30 / V Fib Arrest S/p AICD, Renal Ca S/p Lap Rt Radical, BPH s/p TURP 2022, CKD III, CLL, Hypothyroidism, Gout, Parkinson's misdiagnosis now completely weaned off sinemet, and NPH presented to [facility] on 7/19 for gait impairment, urinary incontinence, and cognitive impairments, found to have NPH, s/p VPS placement 7/19. Patient now admitted for a multidisciplinary rehab program. 07-21-23 @ 15:03    77M with PMH of CAD/MI S/p PCI x 2 6/2022, dementia, HTN, HLD, Anxiety, CHF w/ EF 25-30 / V Fib Arrest S/p AICD, Renal Ca S/p Lap Rt Radical, BPH s/p TURP 2022, CKD III, CLL, Hypothyroidism, Gout, Parkinson's misdiagnosis now completely weaned off sinemet, and NPH presented to [facility] on 7/19 for VPS placement. He c/o gait impairment, difficulty with memory, and urinary frequency/incontinence. MRI brain done showing dilated ventricles suggestive of normal pressure hydrocephalus. He had and elective high volume LP on 7/14 with improvement in his cognition and gait. He ambulates with walker or cane at home. He underwent VPS placement on 7/19. Remainder of hospital course uncomplicated. Patient evaluated by PT/OT/PM&R and recommended for acute inpatient rehab. Patient is medically stable for discharge to [facility] on 7/21.    NPH s/p VPS placement  - Post op CTH 7/19 stable  - Fall/aspiration precautions  - Start comprehensive rehab program of PT/OT/SLP - 3 hours a day, 5 days a week. P&O as needed     CAD   - Hx of V fib arrest s/p AICD placement  - Toprol 100mg qd    CHF  - EF 25-30% (unknown date)  - Off diuretics  - Caution with aggressive hydration    HTN  - Toprol 100mg qd  - Valsartan 160mg qd  - Monitor BP    HLD  - cont statin    Pain  - Tylenol PRN    Sleep  - Melatonin PRN     GI / Bowel  - Senna qHS  - Miralax PRN Daily     / Bladder  - Currently patient voids independently. (+) incontinence    Skin / Pressure injury  - Skin assessment on admission performed [  ] :   - Monitor Incisions:    - Turn q2 hours in bed while awake, air mattress  - nursing to monitor skin qShift  - soft heel protectors  - skin barrier cream PRN    Diet/Dysphagia:  - Diet Consistency: Regular    DVT prophylaxis:   - Lovenox 40mg     Outpatient Follow-up:  Clayton Kirby  Neurosurgery  [address], [address] [place] [address]  Phone: [phone]  Fax: [phone]  Follow Up Time:     Luz Art   Surgery  [address], [address]  [place] [address]  Phone: [phone]  Fax: [phone]  Follow Up Time:    ---------------    Goals: Safe discharge to home  Estimated Length of Stay: 10-14 days  Rehab Potential: Good  Medical Prognosis: Good  Estimated Disposition: Home with home care      PRESCREEN COMPARISON:  I have reviewed the prescreen information and I have found no relevant changes between the preadmission screening and my post admission evaluation.    RATIONALE FOR INPATIENT ADMISSION: Patient demonstrates the following:  [X] Medically appropriate for rehabilitation admission  [X] Has attainable rehab goals with an appropriate initial discharge plan  [X]Has rehabilitation potential (expected to make a significant improvement within a reasonable period of time)  [X] Requires close medical management by a rehab physician, rehab nursing care, Hospitalist and comprehensive interdisciplinary team (including PT, OT and/or SLP, Prosthetics and Orthotics)     Assessment/Plan:  CHA GONZALEZ is a 77y with  PMH of CAD/MI S/p PCI x 2 6/2022, dementia, HTN, HLD, Anxiety, CHF w/ EF 25-30 / V Fib Arrest S/p AICD, Renal Ca S/p Lap Rt Radical, BPH s/p TURP 2022, CKD III, CLL, Hypothyroidism, Gout, Parkinson's misdiagnosis now completely weaned off sinemet, and NPH presented to [facility] on 7/19 for gait impairment, urinary incontinence, and cognitive impairments, found to have NPH, s/p VPS placement 7/19. Patient now admitted for a multidisciplinary rehab program. 07-21-23 @ 15:03    77M with PMH of CAD/MI S/p PCI x 2 6/2022, dementia, HTN, HLD, Anxiety, CHF w/ EF 25-30 / V Fib Arrest S/p AICD, Renal Ca S/p Lap Rt Radical, BPH s/p TURP 2022, CKD III, CLL, Hypothyroidism, Gout, Parkinson's misdiagnosis now completely weaned off sinemet, and NPH presented to [facility] on 7/19 for VPS placement. He c/o gait impairment, difficulty with memory, and urinary frequency/incontinence. MRI brain done showing dilated ventricles suggestive of normal pressure hydrocephalus. He had and elective high volume LP on 7/14 with improvement in his cognition and gait. He ambulates with walker or cane at home. He underwent VPS placement on 7/19. Remainder of hospital course uncomplicated. Patient evaluated by PT/OT/PM&R and recommended for acute inpatient rehab. Patient is medically stable for discharge to [facility] on 7/21.    NPH s/p VPS placement  - Post op CTH 7/19 stable  - Fall/aspiration precautions  - Start comprehensive rehab program of PT/OT/SLP - 3 hours a day, 5 days a week. P&O as needed     CAD   - Hx of V fib arrest s/p AICD placement  - Toprol 100mg qd    CHF  - EF 25-30% (unknown date)  - Off diuretics  - Caution with aggressive hydration    HTN  - Toprol 100mg qd  - Valsartan 160mg qd  - Monitor BP    HLD  - cont statin    Pain  - Tylenol PRN    Sleep  - Melatonin 6mg nightly PRN     GI / Bowel  - Senna qHS  - Miralax PRN Daily     / Bladder  - Currently patient voids independently. (+) incontinence    Skin / Pressure injury  - Skin assessment on admission performed : right cranial VPS incision with staples CORNELL, abd incision sites x 2 with steristrips, periumbilical ecchymosis.  - Turn q2 hours in bed while awake, air mattress  - nursing to monitor skin q Shift  - soft heel protectors  - skin barrier cream PRN    Diet/Dysphagia:  - Diet Consistency: Regular    DVT prophylaxis:   - Lovenox 40mg     Outpatient Follow-up:  Clayton Kirby  Neurosurgery  [address], [address] [place] [id number]  Phone: [phone]  Fax: [phone]    Luz Art   Surgery  [address], [address]  [place] [address]  Phone: [phone]  Fax: [phone]  ---------------    Goals: Safe discharge to home  Estimated Length of Stay: 10-14 days  Rehab Potential: Good  Medical Prognosis: Good  Estimated Disposition: Home with home care      PRESCREEN COMPARISON:  I have reviewed the prescreen information and I have found no relevant changes between the preadmission screening and my post admission evaluation.    RATIONALE FOR INPATIENT ADMISSION: Patient demonstrates the following:  [X] Medically appropriate for rehabilitation admission  [X] Has attainable rehab goals with an appropriate initial discharge plan  [X]Has rehabilitation potential (expected to make a significant improvement within a reasonable period of time)  [X] Requires close medical management by a rehab physician, rehab nursing care, Hospitalist and comprehensive interdisciplinary team (including PT, OT and/or SLP, Prosthetics and Orthotics)     Assessment/Plan:  Case of a 77 year old right handed male patient with past medical history of CAD/MI S/P PCI x 2 6/2022, dementia, HTN, HLD, Anxiety, CHF w/ EF 25-30 / V Fib Arrest S/P AICD, Renal Ca S/P Lap Rt Radical, BPH s/p TURP 2022, CKD III, CLL, Hypothyroidism, Gout, and Parkinson's misdiagnosis now completely weaned off sinemet, and NPH who is admitted for Acute Inpatient Rehabilitation with a multidisciplinary rehab program at [facility] with functional impairments in ADLs and mobility secondary to normal pressure hydrocephalus surgically treated with VPS placement on 7/19/23.      NPH s/p VPS placement  - Post op CTH 7/19 stable  - memory deficits  - Fall/aspiration precautions  - Start comprehensive rehab program of PT/OT/SLP - 3 hours a day, 5 days a week. P&O as needed     CAD   - Hx of V fib arrest s/p AICD placement  - Toprol 100mg qd    CHF  - EF 25-30% (unknown date)  - Off diuretics  - Caution with aggressive hydration    HTN  - Toprol 100mg qd  - Valsartan 160mg qd  - Monitor BP    HLD  - cont statin    Pain  - Tylenol PRN    Sleep  - Melatonin 6mg nightly PRN     GI / Bowel  - Senna qHS  - Miralax PRN Daily     / Bladder  - Currently patient voids independently. (+) incontinence    Skin / Pressure injury  - Skin assessment on admission performed : right cranial VPS incision with staples CORNELL, abd incision sites x 2 with steristrips, periumbilical ecchymosis.  - Turn q2 hours in bed while awake, air mattress  - nursing to monitor skin q Shift  - soft heel protectors  - skin barrier cream PRN    Diet/Dysphagia:  - Diet Consistency: Regular    DVT prophylaxis:   - Lovenox 40mg     Outpatient Follow-up:  Clayton Kirby  Neurosurgery  [address], [address] [place] [id number]  Phone: [phone]  Fax: [phone]    Luz Art   Surgery  [address], [address]  [place] [address]  Phone: [phone]  Fax: [phone]  ---------------    Goals: Safe discharge to home  Estimated Length of Stay: 10-14 days  Rehab Potential: Good  Medical Prognosis: Good  Estimated Disposition: Home with home care      PRESCREEN COMPARISON:  I have reviewed the prescreen information and I have found no relevant changes between the preadmission screening and my post admission evaluation.    RATIONALE FOR INPATIENT ADMISSION: Patient demonstrates the following:  [X] Medically appropriate for rehabilitation admission  [X] Has attainable rehab goals with an appropriate initial discharge plan  [X]Has rehabilitation potential (expected to make a significant improvement within a reasonable period of time)  [X] Requires close medical management by a rehab physician, rehab nursing care, Hospitalist and comprehensive interdisciplinary team (including PT, OT and/or SLP, Prosthetics and Orthotics)

## 2023-07-21 NOTE — CONSULT NOTE ADULT - SUBJECTIVE AND OBJECTIVE BOX
Patient is a 77y old  Male who presents with a chief complaint of NPH (21 Jul 2023 00:27)      HPI:  Taken from neurosurgery note on 6/16/23 "77 year RH male with PMhx HLD, BPH, HLD, CLL presenting for high volume LP.  Patient was misdiagnosed with Parkinson's disease about 6/7 yrs ago. About 8  yrs ago, pt was walking on cobblestone and fell; this led to be seen by  neurology. Vadim scan done on 1/23/2023 which was normal. Pt was started and  titrating up to Sinemet 25/100 5 x a day; he has slowly decreased to TID. He  has noticed no changes with decreasing the dose.  On exam, posture is moderately stooped, with some shortening of gait stride. Pt  requires pushing up to stand.  Postural reflexes are impaired. No other Parkinsonian features.  Pt states he experiences urinary urgency and frequency. Pt gets up q 2- 4 hrs  to use the restroom. Pt had an ablation of his prostate in Nov 2022. Pt is  currently on Finasteride 5 mg and Tamsulosin 0.4 mg  MRI brain done showing dilated ventricles suggestive of normal pressure  hydrocephalus.  "    78 y/o male PMHx BPH s/p TURP 2022, CKD III, CLL, HTN, Hypothyroidism, Gout, Parkinson's misdiagnosis now completely weaned off sinemet, s/p cataract surgery bilateral and hemithyroidectomy, NPH presents for surgical intervention today after mild cognitive improvement and gait improvement after high volume LP.  He reports continued urinary frequency/incontinence, difficulty with memory and difficulty walking. He ambulates with walker or cane at home. (19 Jul 2023 06:28)      Patient seen and examined at bedside, wife present.  Reports poor sleep due to neighbor.  Otherwise patient feels well, denies fever, chills, headache, CP, SOB, N/V, diarrhea or constipation.  Follows with Dr Sparks for his CLL, has not required treatment just serial monitoring    REVIEW OF SYSTEMS: see HPI    PAST MEDICAL & SURGICAL HISTORY:  HTN (hypertension)      Gout  no attack > 5 years      HLD (hyperlipidemia)      Basal cell carcinoma  head      Enlarged prostate with lower urinary tract symptoms (LUTS)      History of 2019 novel coronavirus disease (COVID-19)  April 2020, fatigue, fever and confusion. no hospitalization      GERD (gastroesophageal reflux disease)      CLL (chronic lymphocytic leukemia)      Glaucoma  no eye drops      NPH (normal pressure hydrocephalus)      Hypothyroidism      History of basal cell carcinoma excision  2021      History of partial thyroidectomy  2012, benign nodule      History of vasectomy  age 47      History of prostate surgery  aquablation          FAMILY HISTORY:  MI in father    SOCIAL HISTORY:  Tobacco use: Nonsmoker  EtOH use: None  Recreational drug use: None    MEDICATIONS:  MEDICATIONS  (STANDING):  allopurinol 300 milliGRAM(s) Oral daily  cholecalciferol 2000 Unit(s) Oral daily  enoxaparin Injectable 40 milliGRAM(s) SubCutaneous <User Schedule>  finasteride 5 milliGRAM(s) Oral daily  hydrocortisone 1% Cream 1 Application(s) Topical two times a day  levothyroxine 88 MICROGram(s) Oral daily  melatonin 5 milliGRAM(s) Oral at bedtime  metoprolol succinate  milliGRAM(s) Oral daily  senna 2 Tablet(s) Oral at bedtime  simvastatin 10 milliGRAM(s) Oral at bedtime  valsartan 160 milliGRAM(s) Oral daily    MEDICATIONS  (PRN):  acetaminophen     Tablet .. 1000 milliGRAM(s) Oral every 8 hours PRN Moderate Pain (4 - 6)  hydrALAZINE Injectable 10 milliGRAM(s) IV Push every 4 hours PRN SBP > 140  labetalol Injectable 10 milliGRAM(s) IV Push every 2 hours PRN Systolic blood pressure > 140 mmHg  ondansetron Injectable 4 milliGRAM(s) IV Push every 6 hours PRN Nausea and/or Vomiting      ALLERGIES:  Allergies    No Known Allergies    Intolerances        VITAL SIGNS:  Vital Signs Last 24 Hrs  T(C): 36.7 (21 Jul 2023 11:42), Max: 37.3 (20 Jul 2023 16:51)  T(F): 98 (21 Jul 2023 11:42), Max: 99.2 (20 Jul 2023 16:51)  HR: 71 (21 Jul 2023 11:42) (71 - 86)  BP: 166/84 (21 Jul 2023 11:42) (140/72 - 178/91)  BP(mean): 114 (21 Jul 2023 09:43) (100 - 125)  RR: 15 (21 Jul 2023 11:42) (15 - 18)  SpO2: 94% (21 Jul 2023 11:42) (94% - 98%)    Parameters below as of 21 Jul 2023 11:42  Patient On (Oxygen Delivery Method): room air        07-20-23 @ 07:01  -  07-21-23 @ 07:00  --------------------------------------------------------  IN:    Oral Fluid: 970 mL  Total IN: 970 mL    OUT:    Voided (mL): 500 mL  Total OUT: 500 mL    Total NET: 470 mL          PHYSICAL EXAM:  Constitutional: resting comfortably in bed; NAD  Head: EVD site c/d/i  Eyes: PERRL, EOMI, anicteric sclera  ENT: no nasal discharge;; MMM  Neck: supple  Respiratory: CTA B/L; no W/R/R, no retractions  Cardiac: +S1/S2; RRR; no M/R/G  Gastrointestinal: abdomen soft, NT/ND; no rebound or guarding; +BSx4  Extremities: WWP, no clubbing or cyanosis; no peripheral edema  Vascular: 2+ radial pulses B/L  Dermatologic: skin warm, dry and intact; no rashes, wounds, or scars  Neurologic: AAOx3; no focal deficits  Psychiatric: affect and characteristics of appearance, verbalizations, behaviors are appropriate    LABS:                        12.5   54.61 )-----------( 153      ( 21 Jul 2023 06:34 )             40.1     07-21    141  |  110<H>  |  30<H>  ----------------------------<  123<H>  4.5   |  22  |  1.59<H>    Ca    8.7      21 Jul 2023 06:34  Phos  3.2     07-21  Mg     2.0     07-21        Urinalysis Basic - ( 21 Jul 2023 06:34 )    Color: x / Appearance: x / SG: x / pH: x  Gluc: 123 mg/dL / Ketone: x  / Bili: x / Urobili: x   Blood: x / Protein: x / Nitrite: x   Leuk Esterase: x / RBC: x / WBC x   Sq Epi: x / Non Sq Epi: x / Bacteria: x          CAPILLARY BLOOD GLUCOSE              RADIOLOGY & ADDITIONAL TESTS: Reviewed.

## 2023-07-21 NOTE — H&P ADULT - HISTORY OF PRESENT ILLNESS
77M with PMH of CAD/MI S/p PCI x 2 6/2022, dementia, HTN, HLD, Anxiety, CHF w/ EF 25-30 / V Fib Arrest S/p AICD, Renal Ca S/p Lap Rt Radical, BPH s/p TURP 2022, CKD III, CLL, Hypothyroidism, Gout, Parkinson's misdiagnosis now completely weaned off sinemet, and NPH presented to [facility] on 7/19 for VPS placement. He c/o gait impairment, difficulty with memory, and urinary frequency/incontinence. MRI brain done showing dilated ventricles suggestive of normal pressure hydrocephalus. He had and elective high volume LP on 7/14 with improvement in his cognition and gait. He ambulates with walker or cane at home. He underwent VPS placement on 7/19. Remainder of hospital course uncomplicated. Patient evaluated by PT/OT/PM&R and recommended for acute inpatient rehab. Patient is medically stable for discharge to [facility] on 7/21. Case of a 77 year old right handed male patient with past medical history of CAD/MI S/P PCI x 2 6/2022, dementia, HTN, HLD, Anxiety, CHF w/ EF 25-30 / V Fib Arrest S/P AICD, Renal Ca S/P Lap Rt Radical, BPH s/p TURP 2022, CKD III, CLL, Hypothyroidism, Gout, and Parkinson's misdiagnosis now completely weaned off sinemet, and NPH who presented to [facility] on 7/19 for VPS placement. He complained of gait impairment, difficulty with memory, and urinary frequency/incontinence. MRI brain reported dilated ventricles suggestive of normal pressure hydrocephalus. He had and elective high volume LP on 7/14 with improvement in his cognition and gait. Baseline consisted of home ambulation with walker or cane. He underwent VPS placement on 7/19. Remainder of hospital course uncomplicated. Patient evaluated by PT/OT/PM&R and recommended for acute inpatient rehab. Patient is medically stable for discharge to [facility] on 7/21.

## 2023-07-21 NOTE — OCCUPATIONAL THERAPY INITIAL EVALUATION ADULT - PERTINENT HX OF CURRENT PROBLEM, REHAB EVAL
78 y/o male PMHx BPH s/p TURP 2022, CKD III, CLL, HTN, Hypothyroidism, Gout, Parkinson's misdiagnosis now completely weaned off sinemet, worked up for NPH.

## 2023-07-22 PROBLEM — E03.9 HYPOTHYROIDISM, UNSPECIFIED: Chronic | Status: ACTIVE | Noted: 2023-07-18

## 2023-07-22 LAB
ALBUMIN SERPL ELPH-MCNC: 3.4 G/DL — SIGNIFICANT CHANGE UP (ref 3.3–5)
ALP SERPL-CCNC: 102 U/L — SIGNIFICANT CHANGE UP (ref 40–120)
ALT FLD-CCNC: 6 U/L — LOW (ref 10–45)
ANION GAP SERPL CALC-SCNC: 9 MMOL/L — SIGNIFICANT CHANGE UP (ref 5–17)
AST SERPL-CCNC: 16 U/L — SIGNIFICANT CHANGE UP (ref 10–40)
BASOPHILS # BLD AUTO: 0.04 K/UL — SIGNIFICANT CHANGE UP (ref 0–0.2)
BASOPHILS NFR BLD AUTO: 0.1 % — SIGNIFICANT CHANGE UP (ref 0–2)
BILIRUB SERPL-MCNC: 0.6 MG/DL — SIGNIFICANT CHANGE UP (ref 0.2–1.2)
BUN SERPL-MCNC: 25 MG/DL — HIGH (ref 7–23)
CALCIUM SERPL-MCNC: 9.3 MG/DL — SIGNIFICANT CHANGE UP (ref 8.4–10.5)
CHLORIDE SERPL-SCNC: 106 MMOL/L — SIGNIFICANT CHANGE UP (ref 96–108)
CO2 SERPL-SCNC: 26 MMOL/L — SIGNIFICANT CHANGE UP (ref 22–31)
CREAT SERPL-MCNC: 1.38 MG/DL — HIGH (ref 0.5–1.3)
EGFR: 53 ML/MIN/1.73M2 — LOW
EOSINOPHIL # BLD AUTO: 0.1 K/UL — SIGNIFICANT CHANGE UP (ref 0–0.5)
EOSINOPHIL NFR BLD AUTO: 0.2 % — SIGNIFICANT CHANGE UP (ref 0–6)
GLUCOSE SERPL-MCNC: 104 MG/DL — HIGH (ref 70–99)
HCT VFR BLD CALC: 41.6 % — SIGNIFICANT CHANGE UP (ref 39–50)
HGB BLD-MCNC: 12.8 G/DL — LOW (ref 13–17)
IMM GRANULOCYTES NFR BLD AUTO: 0.4 % — SIGNIFICANT CHANGE UP (ref 0–0.9)
LYMPHOCYTES # BLD AUTO: 49.32 K/UL — HIGH (ref 1–3.3)
LYMPHOCYTES # BLD AUTO: 86.6 % — HIGH (ref 13–44)
MCHC RBC-ENTMCNC: 30.8 GM/DL — LOW (ref 32–36)
MCHC RBC-ENTMCNC: 32.1 PG — SIGNIFICANT CHANGE UP (ref 27–34)
MCV RBC AUTO: 104.3 FL — HIGH (ref 80–100)
MONOCYTES # BLD AUTO: 0.89 K/UL — SIGNIFICANT CHANGE UP (ref 0–0.9)
MONOCYTES NFR BLD AUTO: 1.6 % — LOW (ref 2–14)
NEUTROPHILS # BLD AUTO: 6.36 K/UL — SIGNIFICANT CHANGE UP (ref 1.8–7.4)
NEUTROPHILS NFR BLD AUTO: 11.1 % — LOW (ref 43–77)
NRBC # BLD: 0 /100 WBCS — SIGNIFICANT CHANGE UP (ref 0–0)
PLATELET # BLD AUTO: 154 K/UL — SIGNIFICANT CHANGE UP (ref 150–400)
POTASSIUM SERPL-MCNC: 4.2 MMOL/L — SIGNIFICANT CHANGE UP (ref 3.5–5.3)
POTASSIUM SERPL-SCNC: 4.2 MMOL/L — SIGNIFICANT CHANGE UP (ref 3.5–5.3)
PROT SERPL-MCNC: 6.4 G/DL — SIGNIFICANT CHANGE UP (ref 6–8.3)
RBC # BLD: 3.99 M/UL — LOW (ref 4.2–5.8)
RBC # FLD: 14.3 % — SIGNIFICANT CHANGE UP (ref 10.3–14.5)
SARS-COV-2 RNA SPEC QL NAA+PROBE: SIGNIFICANT CHANGE UP
SODIUM SERPL-SCNC: 141 MMOL/L — SIGNIFICANT CHANGE UP (ref 135–145)
WBC # BLD: 56.95 K/UL — CRITICAL HIGH (ref 3.8–10.5)
WBC # FLD AUTO: 56.95 K/UL — CRITICAL HIGH (ref 3.8–10.5)

## 2023-07-22 PROCEDURE — 99223 1ST HOSP IP/OBS HIGH 75: CPT

## 2023-07-22 RX ADMIN — Medication 300 MILLIGRAM(S): at 12:03

## 2023-07-22 RX ADMIN — FINASTERIDE 5 MILLIGRAM(S): 5 TABLET, FILM COATED ORAL at 12:03

## 2023-07-22 RX ADMIN — Medication 2000 UNIT(S): at 12:03

## 2023-07-22 RX ADMIN — Medication 100 MILLIGRAM(S): at 06:57

## 2023-07-22 RX ADMIN — Medication 6 MILLIGRAM(S): at 22:02

## 2023-07-22 RX ADMIN — Medication 88 MICROGRAM(S): at 06:58

## 2023-07-22 RX ADMIN — ENOXAPARIN SODIUM 40 MILLIGRAM(S): 100 INJECTION SUBCUTANEOUS at 22:02

## 2023-07-22 RX ADMIN — VALSARTAN 160 MILLIGRAM(S): 80 TABLET ORAL at 06:57

## 2023-07-22 RX ADMIN — SIMVASTATIN 10 MILLIGRAM(S): 20 TABLET, FILM COATED ORAL at 22:02

## 2023-07-22 NOTE — CHART NOTE - NSCHARTNOTEFT_GEN_A_CORE
REHABILITATION DIAGNOSIS/IMPAIRMENT GROUP CODE:  NPH s/p VPS placement      COMORBIDITIES/COMPLICATING CONDITIONS IMPACTING REHABILITATION:  HEALTH ISSUES - PROBLEM Dx:  - Post op CTH 7/19 stable  - memory deficits        PAST MEDICAL & SURGICAL HISTORY:  HTN (hypertension)      Gout  no attack > 5 years      HLD (hyperlipidemia)      Basal cell carcinoma  head      Enlarged prostate with lower urinary tract symptoms (LUTS)      History of 2019 novel coronavirus disease (COVID-19)  April 2020, fatigue, fever and confusion. no hospitalization      GERD (gastroesophageal reflux disease)      CLL (chronic lymphocytic leukemia)      Glaucoma  no eye drops      NPH (normal pressure hydrocephalus)      Hypothyroidism      History of basal cell carcinoma excision  2021      History of partial thyroidectomy  2012, benign nodule      History of vasectomy  age 47      History of prostate surgery  aquablation          Based upon consideration of the patient's impairments, functional status, complicating conditions and any other contributing factors and after information garnered from the assessments of all therapy disciplines involved in treating the patient and other pertinent clinicians:    INTERDISCIPLINARY REHABILITATION INTERVENTIONS:    [ X  ] Transfer Training  [X   ] Bed Mobility  [ X  ] Therapeutic Exercise  [ X  ] Balance/Coordination Exercises  [ X  ] Locomotion retraining  [ X  ] Stairs  [ X  ] Functional Transfer Training  [ X  ] Bowel/Bladder program  [  X ] Pain Management  [ X  ] Skin/Wound Care  [X   ] Visual/Perceptual Training  [X   ] Therapeutic Recreation Activities  [  X ] Neuromuscular Re-education  [  X ] Activities of Daily Living  [ X  ] Speech Exercise  [   ] Swallowing Exercises  [   ] Vital Stim  [   ] Dietary Supplements  [   ] Calorie Count  [X   ] Cognitive Exercises  [  X ] Congnitive/Linguistic Treatment  [   ] Behavior Program  [  X ] Neuropsych Therapy  [ X  ] Patient/Family Counseling  [ X  ] Family Training  [ X  ] Community Re-entry  [   ] Orthotic Evaluation  [   ] Prosthetic Eval/Training    MEDICAL PROGNOSIS:  good     REHAB POTENTIAL:  good     EXPECTED DAILY THERAPY:         PT: 1hr/day       OT: 1hr/day       ST: 1hr/day       P&O: 0    EXPECTED INTENSITY OF PROGRAM:  3 hrs/day    EXPECTED FREQUENCY OF PROGRAM:  5 days/week    ESTIMATED LOS:  10-14 days    ESTIMATED DISPOSITION:  home    INTERDISCIPLINARY FUNCTIONAL OUTCOMES?GOALS:         Gait/Mobility: mod-independent       Transfers: mod-independent       ADLs: mod-independent       Functional Transfers: mod-independent       Medication Management: mod-independent       Communication: independent       Cognitive:               Bladder: continent       Bowel: continent

## 2023-07-22 NOTE — CONSULT NOTE ADULT - SUBJECTIVE AND OBJECTIVE BOX
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 on 7/19 for VPS placement. He complained of gait impairment, difficulty with memory, and urinary frequency/incontinence. MRI brain reported dilated ventricles suggestive of normal pressure hydrocephalus. He had and elective high volume LP on 7/14 with improvement in his cognition and gait. Baseline consisted of home ambulation with walker or cane. He underwent VPS placement on 7/19. Remainder of hospital course uncomplicated. Patient evaluated by PT/OT/PM&R and recommended for acute inpatient rehab. Patient is medically stable for discharge to [facility] on 7/21. (21 Jul 2023 15:01)    PAST MEDICAL & SURGICAL HISTORY:  HTN (hypertension)  Gout, no attack > 5 years  HLD (hyperlipidemia)  Basal cell carcinoma, head  Enlarged prostate with lower urinary tract symptoms (LUTS)  History of 2019 novel coronavirus disease (COVID-19); April 2020, fatigue, fever and confusion. no hospitalization  GERD (gastroesophageal reflux disease)  CLL (chronic lymphocytic leukemia)  Glaucoma  NPH (normal pressure hydrocephalus)  Hypothyroidism  History of basal cell carcinoma excision 2021  History of partial thyroidectomy 2012, benign nodule  History of vasectomy age 47  History of prostate surgery aquablation    SOCIAL HISTORY:  Smoking - Denied  EtOH - Former, quit in 1984, smoked 2 ppd for 22 years  Drugs - Denied     FAMILY HISTORY:  MI in Father    ALLERGIES:  No Known Allergies    MEDICATIONS  (STANDING):  allopurinol 300 milliGRAM(s) Oral daily  cholecalciferol 2000 Unit(s) Oral daily  enoxaparin Injectable 40 milliGRAM(s) SubCutaneous <User Schedule>  finasteride 5 milliGRAM(s) Oral daily  levothyroxine 88 MICROGram(s) Oral daily  melatonin 6 milliGRAM(s) Oral at bedtime  metoprolol succinate  milliGRAM(s) Oral daily  senna 2 Tablet(s) Oral at bedtime  simvastatin 10 milliGRAM(s) Oral at bedtime  valsartan 160 milliGRAM(s) Oral daily    MEDICATIONS  (PRN):  acetaminophen     Tablet .. 975 milliGRAM(s) Oral every 8 hours PRN Mild Pain (1 - 3), Moderate Pain (4 - 6), Severe Pain (7 - 10)    Review of Systems: Refer to HPI for pertinent positives and negatives. All other ROS reviewed and negative except as otherwise stated above.    Vital Signs Last 24 Hrs  T(F): 97.9 (22 Jul 2023 07:38), Max: 98.2 (21 Jul 2023 16:18)  HR: 65 (22 Jul 2023 07:38) (61 - 73)  BP: 155/90 (22 Jul 2023 07:38) (142/67 - 180/88)  RR: 16 (22 Jul 2023 07:38) (15 - 17)  SpO2: 94% (22 Jul 2023 07:38) (94% - 96%)    I&O's Summary    21 Jul 2023 07:01  -  22 Jul 2023 07:00  --------------------------------------------------------  IN: 0 mL / OUT: 1 mL / NET: -1 mL      PHYSICAL EXAM:  GENERAL: NAD, well-groomed  HEAD: Anterior scalp surgical site c/d/i  EYES: EOMI, PERRL, conjunctiva and sclera clear  ENMT: Moist mucous membranes  NECK: Supple, no JVD  CHEST/LUNG: Clear to auscultation bilaterally, non-labored breathing, good air entry  HEART: RRR; S1/S2, no murmur  ABDOMEN: Soft, nontender, nondistended; bowel sounds present  EXTREMITIES: No cyanosis, no edema  PSYCH: Normal mood and affect  NERVOUS SYSTEM:  A/O x3, Good concentration; no focal deficits, moves all extremities    LABS:                        12.8   56.95 )-----------( 154      ( 22 Jul 2023 07:50 )             41.6     07-22    141  |  106  |  25  ----------------------------<  104  4.2   |  26  |  1.38    Ca    9.3      22 Jul 2023 07:50  Phos  3.2     07-21  Mg     2.0     07-21    TPro  6.4  /  Alb  3.4  /  TBili  0.6  /  DBili  x   /  AST  16  /  ALT  6   /  AlkPhos  102  07-22      PT/INR - ( 19 Jul 2023 11:06 )   PT: 12.0 sec;   INR: 1.01     PTT - ( 19 Jul 2023 11:06 )  PTT:31.6 sec      Urinalysis Basic - ( 22 Jul 2023 07:50 )    Color: x / Appearance: x / SG: x / pH: x  Gluc: 104 mg/dL / Ketone: x  / Bili: x / Urobili: x   Blood: x / Protein: x / Nitrite: x   Leuk Esterase: x / RBC: x / WBC x   Sq Epi: x / Non Sq Epi: x / Bacteria: x        COVID-19 PCR: [place] (07-21-23 @ 23:07)    RADIOLOGY & ADDITIONAL TESTS:    Previous Imaging Personally Reviewed:  YES  Previous Consultant(s) Notes Reviewed:  YES  Care Discussed with Consultants/Other Providers:  YES Patient is a 77y old  Male who presents with a chief complaint of 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 He complained of gait impairment, difficulty with memory, and urinary frequency/incontinence. MRI brain reported dilated ventricles suggestive of normal pressure hydrocephalus. He had and elective high volume LP on 7/14 with improvement in his cognition and gait. Baseline consisted of home ambulation with walker or cane. He underwent VPS placement on 7/19. Remainder of hospital course uncomplicated. Patient evaluated by PT/OT/PM&R and recommended for acute inpatient rehab. Patient is medically stable for discharge to [facility] on 7/21. (21 Jul 2023 15:01)    Patient seen and examined at bedside for hospitalist consultation. Patient relays concerns of feeling trapped at night due to bed sensor/alarm. No other acute concerns. Otherwise he is in good spirits.    PAST MEDICAL & SURGICAL HISTORY:  HTN (hypertension)  Gout, no attack > 5 years  HLD (hyperlipidemia)  Basal cell carcinoma, head  Enlarged prostate with lower urinary tract symptoms (LUTS)  History of 2019 novel coronavirus disease (COVID-19); April 2020, fatigue, fever and confusion. no hospitalization  GERD (gastroesophageal reflux disease)  CLL (chronic lymphocytic leukemia)  Glaucoma  NPH (normal pressure hydrocephalus)  Hypothyroidism  History of basal cell carcinoma excision 2021  History of partial thyroidectomy 2012, benign nodule  History of vasectomy age 47  History of prostate surgery aquablation    SOCIAL HISTORY:  Smoking - Denied  EtOH - Former, quit in 1984, smoked 2 ppd for 22 years  Drugs - Denied     FAMILY HISTORY:  MI in Father    ALLERGIES:  No Known Allergies    MEDICATIONS  (STANDING):  allopurinol 300 milliGRAM(s) Oral daily  cholecalciferol 2000 Unit(s) Oral daily  enoxaparin Injectable 40 milliGRAM(s) SubCutaneous <User Schedule>  finasteride 5 milliGRAM(s) Oral daily  levothyroxine 88 MICROGram(s) Oral daily  melatonin 6 milliGRAM(s) Oral at bedtime  metoprolol succinate  milliGRAM(s) Oral daily  senna 2 Tablet(s) Oral at bedtime  simvastatin 10 milliGRAM(s) Oral at bedtime  valsartan 160 milliGRAM(s) Oral daily    MEDICATIONS  (PRN):  acetaminophen     Tablet .. 975 milliGRAM(s) Oral every 8 hours PRN Mild Pain (1 - 3), Moderate Pain (4 - 6), Severe Pain (7 - 10)    Review of Systems: Refer to HPI for pertinent positives and negatives. All other ROS reviewed and negative except as otherwise stated above.    Vital Signs Last 24 Hrs  T(F): 97.9 (22 Jul 2023 07:38), Max: 98.2 (21 Jul 2023 16:18)  HR: 65 (22 Jul 2023 07:38) (61 - 73)  BP: 155/90 (22 Jul 2023 07:38) (142/67 - 180/88)  RR: 16 (22 Jul 2023 07:38) (15 - 17)  SpO2: 94% (22 Jul 2023 07:38) (94% - 96%)    I&O's Summary    21 Jul 2023 07:01  -  22 Jul 2023 07:00  --------------------------------------------------------  IN: 0 mL / OUT: 1 mL / NET: -1 mL      PHYSICAL EXAM:  GENERAL: NAD, well-groomed  HEAD: Anterior scalp surgical site c/d/i  EYES: EOMI, PERRL, conjunctiva and sclera clear  ENMT: Moist mucous membranes  NECK: Supple, no JVD  CHEST/LUNG: Clear to auscultation bilaterally, non-labored breathing, good air entry  HEART: RRR; S1/S2, no murmur  ABDOMEN: Soft, nontender, nondistended; bowel sounds present  EXTREMITIES: No cyanosis, no edema  PSYCH: Normal mood and affect  NERVOUS SYSTEM:  A/O x3, Good concentration; no focal deficits, moves all extremities    LABS:                        12.8   56.95 )-----------( 154      ( 22 Jul 2023 07:50 )             41.6     07-22    141  |  106  |  25  ----------------------------<  104  4.2   |  26  |  1.38    Ca    9.3      22 Jul 2023 07:50  Phos  3.2     07-21  Mg     2.0     07-21    TPro  6.4  /  Alb  3.4  /  TBili  0.6  /  DBili  x   /  AST  16  /  ALT  6   /  AlkPhos  102  07-22      PT/INR - ( 19 Jul 2023 11:06 )   PT: 12.0 sec;   INR: 1.01     PTT - ( 19 Jul 2023 11:06 )  PTT:31.6 sec      Urinalysis Basic - ( 22 Jul 2023 07:50 )    Color: x / Appearance: x / SG: x / pH: x  Gluc: 104 mg/dL / Ketone: x  / Bili: x / Urobili: x   Blood: x / Protein: x / Nitrite: x   Leuk Esterase: x / RBC: x / WBC x   Sq Epi: x / Non Sq Epi: x / Bacteria: x        COVID-19 PCR: [facility] (07-21-23 @ 23:07)    RADIOLOGY & ADDITIONAL TESTS:    Previous Imaging Personally Reviewed:  YES  Previous Consultant(s) Notes Reviewed:  YES  Care Discussed with Consultants/Other Providers:  YES

## 2023-07-22 NOTE — CONSULT NOTE ADULT - TIME BILLING
Reviewing chart notes and data, face to face time examining and counseling the patient, communicating with PM&R Dr. Robert and nursing staff on rounds

## 2023-07-23 LAB
HCT VFR BLD CALC: 43.7 % — SIGNIFICANT CHANGE UP (ref 39–50)
HGB BLD-MCNC: 13.4 G/DL — SIGNIFICANT CHANGE UP (ref 13–17)
MCHC RBC-ENTMCNC: 30.7 GM/DL — LOW (ref 32–36)
MCHC RBC-ENTMCNC: 32.3 PG — SIGNIFICANT CHANGE UP (ref 27–34)
MCV RBC AUTO: 105.3 FL — HIGH (ref 80–100)
NRBC # BLD: 0 /100 WBCS — SIGNIFICANT CHANGE UP (ref 0–0)
PLATELET # BLD AUTO: 183 K/UL — SIGNIFICANT CHANGE UP (ref 150–400)
RBC # BLD: 4.15 M/UL — LOW (ref 4.2–5.8)
RBC # FLD: 14 % — SIGNIFICANT CHANGE UP (ref 10.3–14.5)
WBC # BLD: 58.41 K/UL — CRITICAL HIGH (ref 3.8–10.5)
WBC # FLD AUTO: 58.41 K/UL — CRITICAL HIGH (ref 3.8–10.5)

## 2023-07-23 PROCEDURE — 99232 SBSQ HOSP IP/OBS MODERATE 35: CPT

## 2023-07-23 RX ADMIN — Medication 88 MICROGRAM(S): at 06:29

## 2023-07-23 RX ADMIN — Medication 100 MILLIGRAM(S): at 06:28

## 2023-07-23 RX ADMIN — FINASTERIDE 5 MILLIGRAM(S): 5 TABLET, FILM COATED ORAL at 11:04

## 2023-07-23 RX ADMIN — Medication 2000 UNIT(S): at 11:04

## 2023-07-23 RX ADMIN — Medication 300 MILLIGRAM(S): at 11:03

## 2023-07-23 RX ADMIN — Medication 6 MILLIGRAM(S): at 21:51

## 2023-07-23 RX ADMIN — VALSARTAN 160 MILLIGRAM(S): 80 TABLET ORAL at 06:28

## 2023-07-23 RX ADMIN — ENOXAPARIN SODIUM 40 MILLIGRAM(S): 100 INJECTION SUBCUTANEOUS at 21:51

## 2023-07-23 RX ADMIN — SIMVASTATIN 10 MILLIGRAM(S): 20 TABLET, FILM COATED ORAL at 21:51

## 2023-07-23 NOTE — PROGRESS NOTE ADULT - ASSESSMENT
Assessment/Plan:  Case of a 77 year old right handed male patient with past medical history of CAD/MI S/P PCI x 2 6/2022, dementia, HTN, HLD, Anxiety, CHF w/ EF 25-30 / V Fib Arrest S/P AICD, Renal Ca S/P Lap Rt Radical, BPH s/p TURP 2022, CKD III, CLL, Hypothyroidism, Gout, and Parkinson's misdiagnosis now completely weaned off sinemet, and NPH who is admitted for Acute Inpatient Rehabilitation with a multidisciplinary rehab program at [facility] with functional impairments in ADLs and mobility secondary to normal pressure hydrocephalus surgically treated with VPS placement on 7/19/23.    NPH s/p VPS placement  - Post op CTH 7/19 stable  - memory deficits  - Fall/aspiration precautions  - Start comprehensive rehab program of PT/OT/SLP - 3 hours a day, 5 days a week. P&O as needed     Lymphocytosis   - Likely due to CLL  - WBC more than 50 since admission  - Typically with low leukostasis risk    CAD   - Hx of V fib arrest s/p AICD placement  - Toprol 100mg qd    CHF  - EF 25-30% (unknown date)  - Off diuretics  - Caution with aggressive hydration    HTN  - Toprol 100mg qd  - Valsartan 160mg qd  - Monitor BP    HLD  - cont statin    Pain  - Tylenol PRN    Sleep  - Melatonin 6mg nightly PRN     GI / Bowel  - Senna qHS  - Miralax PRN Daily     / Bladder  - Currently patient voids independently. (+) incontinence    Skin / Pressure injury  - Skin assessment on admission performed : right cranial VPS incision with staples CORNELL, abd incision sites x 2 with steristrips, periumbilical ecchymosis.  - Turn q2 hours in bed while awake, air mattress  - nursing to monitor skin q Shift  - soft heel protectors  - skin barrier cream PRN    Diet/Dysphagia:  - Diet Consistency: Regular    DVT prophylaxis:   - Lovenox 40mg     Outpatient Follow-up:  Clayton Kirby  Neurosurgery  [address], [address] [place] [id number]  Phone: [phone]  Fax: [phone]    Luz Art   Surgery  [address], [address]  [place] [address]  Phone: [phone]  Fax: [phone]  ---------------

## 2023-07-23 NOTE — PROGRESS NOTE ADULT - SUBJECTIVE AND OBJECTIVE BOX
HPI:  Case of a 77 year old right handed male patient with past medical history of CAD/MI S/P PCI x 2 6/2022, dementia, HTN, HLD, Anxiety, CHF w/ EF 25-30 / V Fib Arrest S/P AICD, Renal Ca S/P Lap Rt Radical, BPH s/p TURP 2022, CKD III, CLL, Hypothyroidism, Gout, and Parkinson's misdiagnosis now completely weaned off sinemet, and NPH who presented to [facility] on 7/19 for VPS placement. He complained of gait impairment, difficulty with memory, and urinary frequency/incontinence. MRI brain reported dilated ventricles suggestive of normal pressure hydrocephalus. He had and elective high volume LP on 7/14 with improvement in his cognition and gait. Baseline consisted of home ambulation with walker or cane. He underwent VPS placement on 7/19. Remainder of hospital course uncomplicated. Patient evaluated by PT/OT/PM&R and recommended for acute inpatient rehab. Patient is medically stable for discharge to [facility] on 7/21. (21 Jul 2023 15:01)      ___________________________________________________________________________    SUBJECTIVE/ROS  Patient was seen and evaluated at bedside today.  Reported no overnight events and is in no acute distress.  Eager to participate on the recommended rehabilitation program.  Denies any CP, SOB, DESIR, palpitations, fever, chills, body aches, cough, congestion, or any other symptoms at this time.   ___________________________________________________________________________    VITALS  T(C): 36.8 (07-22-23 @ 21:00), Max: 36.8 (07-22-23 @ 21:00)  HR: 58 (07-23-23 @ 06:27) (58 - 74)  BP: 134/83 (07-23-23 @ 06:27) (134/83 - 155/83)  RR: 16 (07-22-23 @ 21:00) (16 - 16)  SpO2: 96% (07-22-23 @ 21:00) (96% - 96%)  ___________________________________________________________________________    LABS                          12.8   56.95 )-----------( 154      ( 22 Jul 2023 07:50 )             41.6       07-22    141  |  106  |  25<H>  ----------------------------<  104<H>  4.2   |  26  |  1.38<H>    Ca    9.3      22 Jul 2023 07:50    TPro  6.4  /  Alb  3.4  /  TBili  0.6  /  DBili  x   /  AST  16  /  ALT  6<L>  /  AlkPhos  102  07-22    ___________________________________________________________________________    MEDICATIONS  (STANDING):  allopurinol 300 milliGRAM(s) Oral daily  cholecalciferol 2000 Unit(s) Oral daily  enoxaparin Injectable 40 milliGRAM(s) SubCutaneous <User Schedule>  finasteride 5 milliGRAM(s) Oral daily  levothyroxine 88 MICROGram(s) Oral daily  melatonin 6 milliGRAM(s) Oral at bedtime  metoprolol succinate  milliGRAM(s) Oral daily  senna 2 Tablet(s) Oral at bedtime  simvastatin 10 milliGRAM(s) Oral at bedtime  valsartan 160 milliGRAM(s) Oral daily    MEDICATIONS  (PRN):  acetaminophen     Tablet .. 975 milliGRAM(s) Oral every 8 hours PRN Mild Pain (1 - 3), Moderate Pain (4 - 6), Severe Pain (7 - 10)    ___________________________________________________________________________    PHYSICAL EXAM:    Gen - NAD, Comfortable  HEENT - NCAT, EOMI, MMM. right fronto-parietal coma-shaped stapled surgical incision c/d/i  Neck - Supple, No limited ROM  Pulm - CTAB, No wheeze, No rhonchi, No crackles  Cardiovascular - RRR, S1S2  Chest - good chest expansion, good respiratory effort  Abdomen - Soft, NT/ND, +BS  Extremities - No Cyanosis, no clubbing, no edema, no calf tenderness  Neuro-     Cognitive - awake, alert, oriented to person, place, date,.  Able  to follow command     Communication - Fluent, Comprehensible     Attention: Intact,     Judgement: Good evidence of judgement     Memory: Recall 3 objects immediate, 0/3 three min later, Recent memory impaired     Cranial Nerves - CN 2-12 intact.      Motor - No focal deficits.                     LEFT    UE - ShAB 5/5, EF 5/5, EE 5/5,  5/5                    RIGHT UE - ShAB 5/5, EF 5/5, EE 5/5,   5/5                    LEFT    LE - HF 5/5, KE 5/5, DF 5/5, PF 5/5                    RIGHT LE - HF 5/5, KE 5/5, DF 5/5, PF 5/5        Sensory - Intact to LT     Reflexes - DTR Intact     Coordination - FTN intact      Tone - normal  Psychiatric - Mood stable, Affect WNL  Skin:  right fronto-parietal coma-shaped stapled surgical incision c/d/i CORNELL, abd incision sites x 2 with steri-strips, periumbilical ecchymosis.  ___________________________________________________________________________ HPI:  Case of a 77 year old right handed male patient with past medical history of CAD/MI S/P PCI x 2 6/2022, dementia, HTN, HLD, Anxiety, CHF w/ EF 25-30 / V Fib Arrest S/P AICD, Renal Ca S/P Lap Rt Radical, BPH s/p TURP 2022, CKD III, CLL, Hypothyroidism, Gout, and Parkinson's misdiagnosis now completely weaned off sinemet, and NPH who presented to [facility] on 7/19 for VPS placement. He complained of gait impairment, difficulty with memory, and urinary frequency/incontinence. MRI brain reported dilated ventricles suggestive of normal pressure hydrocephalus. He had and elective high volume LP on 7/14 with improvement in his cognition and gait. Baseline consisted of home ambulation with walker or cane. He underwent VPS placement on 7/19. Remainder of hospital course uncomplicated. Patient evaluated by PT/OT/PM&R and recommended for acute inpatient rehab. Patient is medically stable for discharge to [facility] on 7/21. (21 Jul 2023 15:01)      ___________________________________________________________________________    SUBJECTIVE/ROS  Patient was seen and evaluated at bedside today.  Reported no overnight events and is in no acute distress.  Patient was not able to recall multiple items discussed on initial evaluation.  Has a perception that to be cared appropriately he should discussions with "management and board o directors"  Was given compassionate reassurance. Will consult Neuropsychology tomorrow.   Lymphocytosis since admission that is likely due to CLL.  WBC more than 50 since admission.  A lengthy discussion took place with patient and his wife.  All questions were answered and they expressed understanding and agreement.   Eager to participate on the recommended rehabilitation program.  Denies any CP, SOB, DESIR, palpitations, fever, chills, body aches, cough, congestion, or any other symptoms at this time.   ___________________________________________________________________________    VITALS  T(C): 36.8 (07-22-23 @ 21:00), Max: 36.8 (07-22-23 @ 21:00)  HR: 58 (07-23-23 @ 06:27) (58 - 74)  BP: 134/83 (07-23-23 @ 06:27) (134/83 - 155/83)  RR: 16 (07-22-23 @ 21:00) (16 - 16)  SpO2: 96% (07-22-23 @ 21:00) (96% - 96%)  ___________________________________________________________________________    LAB                        13.4   58.41 )-----------( 183      ( 23 Jul 2023 12:15 )             43.7                           12.8   56.95 )-----------( 154      ( 22 Jul 2023 07:50 )             41.6       07-22    141  |  106  |  25<H>  ----------------------------<  104<H>  4.2   |  26  |  1.38<H>    Ca    9.3      22 Jul 2023 07:50    TPro  6.4  /  Alb  3.4  /  TBili  0.6  /  DBili  x   /  AST  16  /  ALT  6<L>  /  AlkPhos  102  07-22    ___________________________________________________________________________    MEDICATIONS  (STANDING):  allopurinol 300 milliGRAM(s) Oral daily  cholecalciferol 2000 Unit(s) Oral daily  enoxaparin Injectable 40 milliGRAM(s) SubCutaneous <User Schedule>  finasteride 5 milliGRAM(s) Oral daily  levothyroxine 88 MICROGram(s) Oral daily  melatonin 6 milliGRAM(s) Oral at bedtime  metoprolol succinate  milliGRAM(s) Oral daily  senna 2 Tablet(s) Oral at bedtime  simvastatin 10 milliGRAM(s) Oral at bedtime  valsartan 160 milliGRAM(s) Oral daily    MEDICATIONS  (PRN):  acetaminophen     Tablet .. 975 milliGRAM(s) Oral every 8 hours PRN Mild Pain (1 - 3), Moderate Pain (4 - 6), Severe Pain (7 - 10)    ___________________________________________________________________________    PHYSICAL EXAM:    Gen - NAD, Comfortable  HEENT - NCAT, EOMI, MMM. right fronto-parietal coma-shaped stapled surgical incision c/d/i  Neck - Supple, No limited ROM  Pulm - CTAB, No wheeze, No rhonchi, No crackles  Cardiovascular - RRR, S1S2  Chest - good chest expansion, good respiratory effort  Abdomen - Soft, NT/ND, +BS  Extremities - No Cyanosis, no clubbing, no edema, no calf tenderness  Neuro-     Cognitive - awake, alert, oriented to person, place, date,.  Able  to follow command     Communication - Fluent, Comprehensible     Attention: Intact,     Judgement: Impared judgement     Memory: Recall 3 objects immediate, 0/3 three min later, Recent memory impaired     Cranial Nerves - CN 2-12 intact.      Motor - No focal deficits.                     LEFT    UE - ShAB 5/5, EF 5/5, EE 5/5,  5/5                    RIGHT UE - ShAB 5/5, EF 5/5, EE 5/5,   5/5                    LEFT    LE - HF 5/5, KE 5/5, DF 5/5, PF 5/5                    RIGHT LE - HF 5/5, KE 5/5, DF 5/5, PF 5/5        Sensory - Intact to LT     Reflexes - DTR Intact     Coordination - FTN intact      Tone - normal  Psychiatric - Mood stable, Affect WNL  Skin:  right fronto-parietal coma-shaped stapled surgical incision c/d/i CORNELL, abd incision sites x 2 with steri-strips, periumbilical ecchymosis.  ___________________________________________________________________________

## 2023-07-24 ENCOUNTER — NON-APPOINTMENT (OUTPATIENT)
Age: 78
End: 2023-07-24

## 2023-07-24 DIAGNOSIS — Z79.82 LONG TERM (CURRENT) USE OF ASPIRIN: ICD-10-CM

## 2023-07-24 DIAGNOSIS — K21.9 GASTRO-ESOPHAGEAL REFLUX DISEASE WITHOUT ESOPHAGITIS: ICD-10-CM

## 2023-07-24 DIAGNOSIS — Z79.890 HORMONE REPLACEMENT THERAPY: ICD-10-CM

## 2023-07-24 DIAGNOSIS — H40.9 UNSPECIFIED GLAUCOMA: ICD-10-CM

## 2023-07-24 DIAGNOSIS — N18.31 CHRONIC KIDNEY DISEASE, STAGE 3A: ICD-10-CM

## 2023-07-24 DIAGNOSIS — R27.0 ATAXIA, UNSPECIFIED: ICD-10-CM

## 2023-07-24 DIAGNOSIS — C91.11 CHRONIC LYMPHOCYTIC LEUKEMIA OF B-CELL TYPE IN REMISSION: ICD-10-CM

## 2023-07-24 DIAGNOSIS — Z98.52 VASECTOMY STATUS: ICD-10-CM

## 2023-07-24 DIAGNOSIS — Z85.828 PERSONAL HISTORY OF OTHER MALIGNANT NEOPLASM OF SKIN: ICD-10-CM

## 2023-07-24 DIAGNOSIS — R39.15 URGENCY OF URINATION: ICD-10-CM

## 2023-07-24 DIAGNOSIS — G91.2 (IDIOPATHIC) NORMAL PRESSURE HYDROCEPHALUS: ICD-10-CM

## 2023-07-24 DIAGNOSIS — I12.9 HYPERTENSIVE CHRONIC KIDNEY DISEASE WITH STAGE 1 THROUGH STAGE 4 CHRONIC KIDNEY DISEASE, OR UNSPECIFIED CHRONIC KIDNEY DISEASE: ICD-10-CM

## 2023-07-24 DIAGNOSIS — M10.9 GOUT, UNSPECIFIED: ICD-10-CM

## 2023-07-24 DIAGNOSIS — Z79.899 OTHER LONG TERM (CURRENT) DRUG THERAPY: ICD-10-CM

## 2023-07-24 DIAGNOSIS — E78.5 HYPERLIPIDEMIA, UNSPECIFIED: ICD-10-CM

## 2023-07-24 DIAGNOSIS — N40.1 BENIGN PROSTATIC HYPERPLASIA WITH LOWER URINARY TRACT SYMPTOMS: ICD-10-CM

## 2023-07-24 DIAGNOSIS — E89.0 POSTPROCEDURAL HYPOTHYROIDISM: ICD-10-CM

## 2023-07-24 LAB
ANION GAP SERPL CALC-SCNC: 9 MMOL/L — SIGNIFICANT CHANGE UP (ref 5–17)
BUN SERPL-MCNC: 32 MG/DL — HIGH (ref 7–23)
CALCIUM SERPL-MCNC: 9.1 MG/DL — SIGNIFICANT CHANGE UP (ref 8.4–10.5)
CHLORIDE SERPL-SCNC: 107 MMOL/L — SIGNIFICANT CHANGE UP (ref 96–108)
CO2 SERPL-SCNC: 24 MMOL/L — SIGNIFICANT CHANGE UP (ref 22–31)
CREAT SERPL-MCNC: 1.44 MG/DL — HIGH (ref 0.5–1.3)
EGFR: 50 ML/MIN/1.73M2 — LOW
GLUCOSE SERPL-MCNC: 107 MG/DL — HIGH (ref 70–99)
HCT VFR BLD CALC: 39.8 % — SIGNIFICANT CHANGE UP (ref 39–50)
HGB BLD-MCNC: 12.5 G/DL — LOW (ref 13–17)
MCHC RBC-ENTMCNC: 31.4 GM/DL — LOW (ref 32–36)
MCHC RBC-ENTMCNC: 32.6 PG — SIGNIFICANT CHANGE UP (ref 27–34)
MCV RBC AUTO: 103.6 FL — HIGH (ref 80–100)
NRBC # BLD: 0 /100 WBCS — SIGNIFICANT CHANGE UP (ref 0–0)
PLATELET # BLD AUTO: 163 K/UL — SIGNIFICANT CHANGE UP (ref 150–400)
POTASSIUM SERPL-MCNC: 4.1 MMOL/L — SIGNIFICANT CHANGE UP (ref 3.5–5.3)
POTASSIUM SERPL-SCNC: 4.1 MMOL/L — SIGNIFICANT CHANGE UP (ref 3.5–5.3)
RBC # BLD: 3.84 M/UL — LOW (ref 4.2–5.8)
RBC # FLD: 14 % — SIGNIFICANT CHANGE UP (ref 10.3–14.5)
SODIUM SERPL-SCNC: 140 MMOL/L — SIGNIFICANT CHANGE UP (ref 135–145)
WBC # BLD: 46.94 K/UL — CRITICAL HIGH (ref 3.8–10.5)
WBC # FLD AUTO: 46.94 K/UL — CRITICAL HIGH (ref 3.8–10.5)

## 2023-07-24 PROCEDURE — 99232 SBSQ HOSP IP/OBS MODERATE 35: CPT

## 2023-07-24 PROCEDURE — 96121 NUBHVL XM PHY/QHP EA ADDL HR: CPT

## 2023-07-24 PROCEDURE — 96116 NUBHVL XM PHYS/QHP 1ST HR: CPT

## 2023-07-24 RX ADMIN — VALSARTAN 160 MILLIGRAM(S): 80 TABLET ORAL at 05:51

## 2023-07-24 RX ADMIN — Medication 100 MILLIGRAM(S): at 05:51

## 2023-07-24 RX ADMIN — Medication 88 MICROGRAM(S): at 05:51

## 2023-07-24 RX ADMIN — SENNA PLUS 2 TABLET(S): 8.6 TABLET ORAL at 21:59

## 2023-07-24 RX ADMIN — ENOXAPARIN SODIUM 40 MILLIGRAM(S): 100 INJECTION SUBCUTANEOUS at 22:00

## 2023-07-24 RX ADMIN — SIMVASTATIN 10 MILLIGRAM(S): 20 TABLET, FILM COATED ORAL at 21:59

## 2023-07-24 RX ADMIN — FINASTERIDE 5 MILLIGRAM(S): 5 TABLET, FILM COATED ORAL at 11:50

## 2023-07-24 RX ADMIN — Medication 300 MILLIGRAM(S): at 11:51

## 2023-07-24 RX ADMIN — Medication 2000 UNIT(S): at 11:50

## 2023-07-24 NOTE — DIETITIAN INITIAL EVALUATION ADULT - PERTINENT MEDS FT
MEDICATIONS  (STANDING):  allopurinol 300 milliGRAM(s) Oral daily  cholecalciferol 2000 Unit(s) Oral daily  enoxaparin Injectable 40 milliGRAM(s) SubCutaneous <User Schedule>  finasteride 5 milliGRAM(s) Oral daily  levothyroxine 88 MICROGram(s) Oral daily  melatonin 6 milliGRAM(s) Oral at bedtime  metoprolol succinate  milliGRAM(s) Oral daily  senna 2 Tablet(s) Oral at bedtime  simvastatin 10 milliGRAM(s) Oral at bedtime  valsartan 160 milliGRAM(s) Oral daily    MEDICATIONS  (PRN):  acetaminophen     Tablet .. 975 milliGRAM(s) Oral every 8 hours PRN Mild Pain (1 - 3), Moderate Pain (4 - 6), Severe Pain (7 - 10)

## 2023-07-24 NOTE — CONSULT NOTE ADULT - SUBJECTIVE AND OBJECTIVE BOX
Patient seen alone at bedside for 60-minute, initial neuropsychology consultation. Neuropsychologist is introduced as a member of the rehabilitation team and the purpose of the session is explained. Patient agrees to participate.    Per patient, he recently underwent placement of a  shunt at [facility]. He indicates being recommended for acute rehabilitation services, though he does not understand the purpose/goals. He states, "maybe they want to keep an eye on me in the days after I had this [pointing to shunt] so I don't do something stupid and hurt myself."     Medical records are reviewed. Per records: Case of a 77-year-old, right-handed, male patient with past medical history of CAD/MI S/P PCI x 2 6/2022, dementia, HTN, HLD, Anxiety, CHF w/ EF 25-30 / V Fib Arrest S/P AICD, Renal Ca S/P Lap Rt Radical, BPH s/p TURP 2022, CKD III, CLL, Hypothyroidism, Gout, and Parkinson's misdiagnosis now completely weaned off sinemet, and NPH who presented to [facility] on 7/19 for VPS placement. He complained of gait impairment, difficulty with memory, and urinary frequency/incontinence. MRI brain reported dilated ventricles suggestive of normal pressure hydrocephalus. He had and elective high volume LP on 7/14 with improvement in his cognition and gait. Baseline consisted of home ambulation with walker or cane. He underwent VPS placement on 7/19. Remainder of hospital course uncomplicated. Patient evaluated by PT/OT/PM&R and recommended for acute inpatient rehab. Patient is medically stable for discharge to [facility] on 7/21.

## 2023-07-24 NOTE — PROGRESS NOTE ADULT - ASSESSMENT
77 year old male with past medical history of CAD/MI S/P PCI x 2 6/2022, dementia, HTN, HLD, Anxiety, CHF w/ EF 25-30% / V Fib Arrest S/P AICD, Renal Ca S/P Lap Rt Radical, BPH s/p TURP 2022, CKD III, CLL, Hypothyroidism, Gout, and Parkinson's misdiagnosis now completely weaned off sinemet, and NPH is admitted for Acute Inpatient Rehabilitation with functional impairments in ADLs and mobility secondary to normal pressure hydrocephalus surgically treated with VPS placement on 7/19/23.    #NPH s/p VPS placement  #Memory deficits  - Postop CTH 7/19 stable  - Fall/aspiration precautions  - Rehab, pain and bowel regimen per primary team    #CAD   - Hx of V fib arrest s/p AICD placement  - Toprol 100 mg daily    #HFrEF  - EF 25-30% (unknown date)  - Continue BB, ARB  - Off diuretics  - Monitor volume status, caution with aggressive hydration    #HTN  - Toprol 100 mg daily  - Valsartan 160 mg daily  - Monitor BP    #HLD  - continue statin    #DVT prophylaxis  - Lovenox 40 mg daily

## 2023-07-24 NOTE — CONSULT NOTE ADULT - ASSESSMENT
Patient is seated in wheelchair at bedside. His wife is present. Patient is alert engages in session. He indicates since shunt, his walking has improved, he notices no change in his cognitive (memory) functioning, and he is still using a diaper to address changes in contingence. He expresses frustration with being in acute rehabilitation and indicates "nobody gives him straight answers about why he is here." Patient's wife indicates her goals are for patient to improve his walking and balance, and for her to feel more confident regarding his safety when she leaves the house.     Patient reports longstanding cognitive issues stemming from childhood. He notes being forgetful and, at times, demonstrating poor recognition of people he knows well. He recalls being self-aware of his cognitive difficulties at a young age and made a point to write notes in a way that helped him recall information. He indicates striving to have near perfect attendance in school, in effort to facilitate recall of as much information as possible, knowing he was still likely to forget. Patient's cognitive issues were never evaluated and/or formally diagnosed and did not hold him back from attaining academic and occupational achievement.     Cognitive screening/mental status with the Modified Mini-Mental is mildly impaired (3MS = 80/100). He is oriented to person and place. He knows the month, year, day of week, but not the date. Basic auditory attention span and more complex attention span are intact. He registers 3 words after 1 learning trial. After a brief delay, he recalls 0/3 words spontaneously, and after a longer delay, he recalls 0/3 words spontaneously. He benefits from recognition prompts. He correctly names objects presented to him. Verbal/semantic fluency is reduced. He follows commands presented visually and aurally. Repetition is intact. He responds appropriately to situational judgment and reasoning questions. Abstract reasoning is intact for interpreting similatities and proverbs. Writing a sentence to dictation is intact and legible.  His drawing of a clock is intact, though he requires repetition of instructions.     Mood appears anxious and frustrated. He reports a longstanding history of anxiety and "being antsy." He never sought treatment, stating he "just dealt with it." On self-report measures of recent mood, his responses indicate the presence of moderate anxiety (GAS-10 = 11/30) and minimal depression (GDS-Short = 5/15), including irritability, restlessness, fatigue, worry, boredom, and feelings of helplessness. He denies SI/HI/I/P. He denies AH/VH. He indicates using ETOH excessively in the past, not presently. He stopped smoking many years ago. He denies use substances.    Psychosocial history: He earned a bachelor's degree in engineering from [employer]. He worked as an  for 40 years, in various settings. He states having "no tolerance for stupidity," which resulted in him frequently switching jobs over the course of his career. He retired 10 years ago. He resides with his wife. He has 4 children and 5 grandchildren.     Impression: Patient with adjustment difficulty to his current health concerns and present situation (being in acute rehabilitation). On cognitive screening, he exhibits mild impairment marked by a rapid forgetting pattern and reduced verbal fluency. Mood is anxious and frustrated. Insight/awareness appears reduced regarding clinical utility/purpose of acute rehabilitation in his particular situation.     Overall presentation is multifactorial, given his recent and remote health history, possible longstanding cognitive difficulties, and mood/personality factors.     Psychoeducation is provided regarding the rehabilitation process, safety awareness, and cognitive and emotional functioning. Support and encouragement are provided.     Plan: Coordination of care with clinical team. Neuropsychology remains available, as needed, until discharge.     
77 year old male with past medical history of CAD/MI S/P PCI x 2 6/2022, dementia, HTN, HLD, Anxiety, CHF w/ EF 25-30% / V Fib Arrest S/P AICD, Renal Ca S/P Lap Rt Radical, BPH s/p TURP 2022, CKD III, CLL, Hypothyroidism, Gout, and Parkinson's misdiagnosis now completely weaned off sinemet, and NPH is admitted for Acute Inpatient Rehabilitation with functional impairments in ADLs and mobility secondary to normal pressure hydrocephalus surgically treated with VPS placement on 7/19/23.    #NPH s/p VPS placement  #Memory deficits  - Postop CTH 7/19 stable  - Fall/aspiration precautions  - Rehab, pain and bowel regimen per primary team    #CAD   - Hx of V fib arrest s/p AICD placement  - Toprol 100 mg daily    #HFrEF  - EF 25-30% (unknown date)  - Continue BB, ARB  - Off diuretics  - Monitor volume status, caution with aggressive hydration    #HTN  - Toprol 100 mg daily  - Valsartan 160 mg daily  - Monitor BP    #HLD  - continue statin    #DVT prophylaxis  - Lovenox 40 mg daily

## 2023-07-24 NOTE — PROGRESS NOTE ADULT - SUBJECTIVE AND OBJECTIVE BOX
HPI:  Case of a 77 year old right handed male patient with past medical history of CAD/MI S/P PCI x 2 6/2022, dementia, HTN, HLD, Anxiety, CHF w/ EF 25-30 / V Fib Arrest S/P AICD, Renal Ca S/P Lap Rt Radical, BPH s/p TURP 2022, CKD III, CLL, Hypothyroidism, Gout, and Parkinson's misdiagnosis now completely weaned off sinemet, and NPH who presented to [facility] on 7/19 for VPS placement. He complained of gait impairment, difficulty with memory, and urinary frequency/incontinence. MRI brain reported dilated ventricles suggestive of normal pressure hydrocephalus. He had and elective high volume LP on 7/14 with improvement in his cognition and gait. Baseline consisted of home ambulation with walker or cane. He underwent VPS placement on 7/19. Remainder of hospital course uncomplicated. Patient evaluated by PT/OT/PM&R and recommended for acute inpatient rehab. Patient is medically stable for discharge to [facility] on 7/21. (21 Jul 2023 15:01)      ___________________________________________________________________________    SUBJECTIVE/ROS  Patient was seen and evaluated at bedside today.  Reported no overnight events and is in no acute distress.  Patient was not able to recall multiple items discussed on initial evaluation.  Has a perception that to be cared appropriately he should discussions with "management and board of directors"  Neuropsychology consulted.   Lymphocytosis since admission that is likely due to CLL.  Eager to participate on the recommended rehabilitation program.  Denies any CP, SOB, DESIR, palpitations, fever, chills, body aches, cough, congestion, or any other symptoms at this time.   ___________________________________________________________________________    Vital Signs Last 24 Hrs  T(C): 36.6 (24 Jul 2023 07:56), Max: 37.1 (23 Jul 2023 20:07)  T(F): 97.8 (24 Jul 2023 07:56), Max: 98.8 (23 Jul 2023 20:07)  HR: 67 (24 Jul 2023 07:56) (64 - 81)  BP: 153/87 (24 Jul 2023 07:56) (153/87 - 166/94)  BP(mean): --  RR: 16 (24 Jul 2023 07:56) (16 - 17)  SpO2: 95% (24 Jul 2023 07:56) (95% - 95%)    Parameters below as of 24 Jul 2023 07:56  Patient On (Oxygen Delivery Method): room air  ___________________________________________________________________________    LAB               12.5   46.94 )-----------( 163      ( 24 Jul 2023 06:04 )             39.8                         13.4   58.41 )-----------( 183      ( 23 Jul 2023 12:15 )             43.7                           12.8   56.95 )-----------( 154      ( 22 Jul 2023 07:50 )             41.6       07-24    140  |  107  |  32<H>  ----------------------------<  107<H>  4.1   |  24  |  1.44<H>    Ca    9.1      24 Jul 2023 06:04  ___________________________________________________________________________    MEDICATIONS  (STANDING):  allopurinol 300 milliGRAM(s) Oral daily  cholecalciferol 2000 Unit(s) Oral daily  enoxaparin Injectable 40 milliGRAM(s) SubCutaneous <User Schedule>  finasteride 5 milliGRAM(s) Oral daily  levothyroxine 88 MICROGram(s) Oral daily  melatonin 6 milliGRAM(s) Oral at bedtime  metoprolol succinate  milliGRAM(s) Oral daily  senna 2 Tablet(s) Oral at bedtime  simvastatin 10 milliGRAM(s) Oral at bedtime  valsartan 160 milliGRAM(s) Oral daily    MEDICATIONS  (PRN):  acetaminophen     Tablet .. 975 milliGRAM(s) Oral every 8 hours PRN Mild Pain (1 - 3), Moderate Pain (4 - 6), Severe Pain (7 - 10)  ___________________________________________________________________________    PHYSICAL EXAM:    Gen - NAD, Comfortable  HEENT - NCAT, EOMI, MMM. right fronto-parietal coma-shaped stapled surgical incision c/d/i  Neck - Supple, No limited ROM  Pulm - CTAB, No wheeze, No rhonchi, No crackles  Cardiovascular - RRR, S1S2  Chest - good chest expansion, good respiratory effort  Abdomen - Soft, NT/ND  Extremities - no edema, no calf tenderness  Neuro-     Cognitive - awake, alert, oriented to person, place, date,.  Able  to follow command     Communication - Fluent, Comprehensible     Attention: Intact,     Judgement: Impaired judgement     Memory: Recall 3 objects immediate, 0/3 three min later, Recent memory impaired     Cranial Nerves - CN 2-12 intact.      Motor - No focal deficits.                     LEFT    UE - ShAB 5/5, EF 5/5, EE 5/5,  5/5                    RIGHT UE - ShAB 5/5, EF 5/5, EE 5/5,   5/5                    LEFT    LE - HF 5/5, KE 5/5, DF 5/5, PF 5/5                    RIGHT LE - HF 5/5, KE 5/5, DF 5/5, PF 5/5        Sensory - Intact to LT     Reflexes - DTR Intact     Coordination - FTN intact      Tone - normal  Psychiatric - Mood stable, Affect WNL  Skin:  right fronto-parietal coma-shaped stapled surgical incision c/d/i CORNELL, abd incision sites x 2 with steri-strips, periumbilical ecchymosis.  ___________________________________________________________________________ HPI:  Case of a 77 year old right handed male patient with past medical history of CAD/MI S/P PCI x 2 6/2022, dementia, HTN, HLD, Anxiety, CHF w/ EF 25-30 / V Fib Arrest S/P AICD, Renal Ca S/P Lap Rt Radical, BPH s/p TURP 2022, CKD III, CLL, Hypothyroidism, Gout, and Parkinson's misdiagnosis now completely weaned off sinemet, and NPH who presented to [facility] on 7/19 for VPS placement. He complained of gait impairment, difficulty with memory, and urinary frequency/incontinence. MRI brain reported dilated ventricles suggestive of normal pressure hydrocephalus. He had and elective high volume LP on 7/14 with improvement in his cognition and gait. Baseline consisted of home ambulation with walker or cane. He underwent VPS placement on 7/19. Remainder of hospital course uncomplicated. Patient evaluated by PT/OT/PM&R and recommended for acute inpatient rehab. Patient is medically stable for discharge to [facility] on 7/21. (21 Jul 2023 15:01)      ___________________________________________________________________________    SUBJECTIVE/ROS  Patient was seen and evaluated at bedside today.  Reported no overnight events and is in no acute distress.  Evaluated by Neuropsychology and assessment appreciated.  Lymphocytosis since admission that is likely due to CLL.  Case to be reevaluated at Interdisciplinary Team meeting tomorrow.  Eager to participate on the recommended rehabilitation program.  Denies any CP, SOB, DESIR, palpitations, fever, chills, body aches, cough, congestion, or any other symptoms at this time.   ___________________________________________________________________________    Vital Signs Last 24 Hrs  T(C): 36.6 (24 Jul 2023 07:56), Max: 37.1 (23 Jul 2023 20:07)  T(F): 97.8 (24 Jul 2023 07:56), Max: 98.8 (23 Jul 2023 20:07)  HR: 67 (24 Jul 2023 07:56) (64 - 81)  BP: 153/87 (24 Jul 2023 07:56) (153/87 - 166/94)  RR: 16 (24 Jul 2023 07:56) (16 - 17)  SpO2: 95% (24 Jul 2023 07:56) (95% - 95%)    ___________________________________________________________________________    LAB               12.5   46.94 )-----------( 163      ( 24 Jul 2023 06:04 )             39.8                         13.4   58.41 )-----------( 183      ( 23 Jul 2023 12:15 )             43.7                           12.8   56.95 )-----------( 154      ( 22 Jul 2023 07:50 )             41.6       07-24    140  |  107  |  32<H>  ----------------------------<  107<H>  4.1   |  24  |  1.44<H>    Ca    9.1      24 Jul 2023 06:04  ___________________________________________________________________________    MEDICATIONS  (STANDING):  allopurinol 300 milliGRAM(s) Oral daily  cholecalciferol 2000 Unit(s) Oral daily  enoxaparin Injectable 40 milliGRAM(s) SubCutaneous <User Schedule>  finasteride 5 milliGRAM(s) Oral daily  levothyroxine 88 MICROGram(s) Oral daily  melatonin 6 milliGRAM(s) Oral at bedtime  metoprolol succinate  milliGRAM(s) Oral daily  senna 2 Tablet(s) Oral at bedtime  simvastatin 10 milliGRAM(s) Oral at bedtime  valsartan 160 milliGRAM(s) Oral daily    MEDICATIONS  (PRN):  acetaminophen     Tablet .. 975 milliGRAM(s) Oral every 8 hours PRN Mild Pain (1 - 3), Moderate Pain (4 - 6), Severe Pain (7 - 10)  ___________________________________________________________________________    PHYSICAL EXAM:    Gen - NAD, Comfortable  HEENT - NCAT, EOMI, MMM. right fronto-parietal coma-shaped stapled surgical incision c/d/i  Neck - Supple, No limited ROM  Pulm - CTAB, No wheeze, No rhonchi, No crackles  Cardiovascular - RRR, S1S2  Chest - good chest expansion, good respiratory effort  Abdomen - Soft, NT/ND  Extremities - no edema, no calf tenderness  Neuro-     Cognitive - awake, alert, oriented to person, place, date,.  Able  to follow command     Communication - Fluent, Comprehensible     Attention: Intact,     Judgement: Impaired judgement     Memory: Recall 3 objects immediate, 0/3 three min later, Recent memory impaired     Cranial Nerves - CN 2-12 intact.      Motor - No focal deficits.                     LEFT    UE - ShAB 5/5, EF 5/5, EE 5/5,  5/5                    RIGHT UE - ShAB 5/5, EF 5/5, EE 5/5,   5/5                    LEFT    LE - HF 5/5, KE 5/5, DF 5/5, PF 5/5                    RIGHT LE - HF 5/5, KE 5/5, DF 5/5, PF 5/5        Sensory - Intact to LT     Reflexes - DTR Intact     Coordination - FTN intact      Tone - normal  Psychiatric - Mood stable, Affect WNL  Skin:  right fronto-parietal coma-shaped stapled surgical incision c/d/i CORNELL, abd incision sites x 2 with steri-strips, periumbilical ecchymosis.  ___________________________________________________________________________

## 2023-07-24 NOTE — PROGRESS NOTE ADULT - ASSESSMENT
Assessment/Plan:  Case of a 77 year old right handed male patient with past medical history of CAD/MI S/P PCI x 2 6/2022, dementia, HTN, HLD, Anxiety, CHF w/ EF 25-30 / V Fib Arrest S/P AICD, Renal Ca S/P Lap Rt Radical, BPH s/p TURP 2022, CKD III, CLL, Hypothyroidism, Gout, and Parkinson's misdiagnosis now completely weaned off sinemet, and NPH who is admitted for Acute Inpatient Rehabilitation with a multidisciplinary rehab program at [facility] with functional impairments in ADLs and mobility secondary to normal pressure hydrocephalus surgically treated with VPS placement on 7/19/23.    NPH s/p VPS placement  - Post op CTH 7/19 stable  - memory deficits  - Fall/aspiration precautions  - Start comprehensive rehab program of PT/OT/SLP - 3 hours a day, 5 days a week. P&O as needed     Lymphocytosis   - Likely due to CLL  - Typically with low leukostasis risk    CAD   - Hx of V fib arrest s/p AICD placement  - Toprol 100mg qd    CHF  - EF 25-30% (unknown date)  - Off diuretics  - Caution with aggressive hydration    HTN  - Toprol 100mg qd  - Valsartan 160mg qd  - Monitor BP    HLD  - cont simvastatin 10 mg QHS    Pain  - Tylenol PRN    Sleep  - Melatonin 6mg nightly PRN     GI / Bowel  - Senna qHS     / Bladder  - Currently patient voids independently. (+) incontinence    Skin / Pressure injury  - Skin assessment on admission performed : right cranial VPS incision with staples CORNELL, abd incision sites x 2 with steristrips, periumbilical ecchymosis.  - Turn q2 hours in bed while awake, air mattress  - nursing to monitor skin q Shift  - soft heel protectors  - skin barrier cream PRN    Diet/Dysphagia:  - Diet Consistency: Regular    DVT prophylaxis:   - Lovenox 40mg     Outpatient Follow-up:  Clayton Kirby  Neurosurgery  [address], [address] [place] [id number]  Phone: [phone]  Fax: [phone]    Luz Art   Surgery  [address], [address]  [place] [address]  Phone: [phone]  Fax: [phone]  ---------------

## 2023-07-24 NOTE — DIETITIAN INITIAL EVALUATION ADULT - OTHER INFO
Initial Nutrition Assessment   77yr Old Male   Denies Food Allergy/Intolerance  Tolerates Diet Well - No Chewing/Swallowing Complications (Per Patient)  Consumed % of Meals (as Per Documentation)  Surgical Incision on Headx2 & Abdomen x2 and No Pressure Ulcers (as Per Nursing Flow Sheets)  No Edema Noted (as Per Nursing Flow Sheets)  No Recent Nausea/Vomiting/Diarrhea/Constipation (as Per Patient)

## 2023-07-24 NOTE — CONSULT NOTE ADULT - REASON FOR ADMISSION
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

## 2023-07-24 NOTE — DIETITIAN INITIAL EVALUATION ADULT - PERTINENT LABORATORY DATA
07-24    140  |  107  |  32<H>  ----------------------------<  107<H>  4.1   |  24  |  1.44<H>    Ca    9.1      24 Jul 2023 06:04

## 2023-07-24 NOTE — PROGRESS NOTE ADULT - SUBJECTIVE AND OBJECTIVE BOX
Patient is a 77y old  Male who presents with a chief complaint of Non-Traumatic Brain Injury due to NPH S/P VPS placement (23 Jul 2023 11:56)    Patient seen and examined at bedside. No acute overnight events.     ALLERGIES:  No Known Allergies    MEDICATIONS  (STANDING):  allopurinol 300 milliGRAM(s) Oral daily  cholecalciferol 2000 Unit(s) Oral daily  enoxaparin Injectable 40 milliGRAM(s) SubCutaneous <User Schedule>  finasteride 5 milliGRAM(s) Oral daily  levothyroxine 88 MICROGram(s) Oral daily  melatonin 6 milliGRAM(s) Oral at bedtime  metoprolol succinate  milliGRAM(s) Oral daily  senna 2 Tablet(s) Oral at bedtime  simvastatin 10 milliGRAM(s) Oral at bedtime  valsartan 160 milliGRAM(s) Oral daily    MEDICATIONS  (PRN):  acetaminophen     Tablet .. 975 milliGRAM(s) Oral every 8 hours PRN Mild Pain (1 - 3), Moderate Pain (4 - 6), Severe Pain (7 - 10)    Vital Signs Last 24 Hrs  T(F): 97.8 (24 Jul 2023 07:56), Max: 98.8 (23 Jul 2023 20:07)  HR: 67 (24 Jul 2023 07:56) (64 - 81)  BP: 153/87 (24 Jul 2023 07:56) (153/87 - 166/94)  RR: 16 (24 Jul 2023 07:56) (16 - 17)  SpO2: 95% (24 Jul 2023 07:56) (95% - 95%)    I&O's Summary    23 Jul 2023 07:01  -  24 Jul 2023 07:00  --------------------------------------------------------  IN: 0 mL / OUT: 4 mL / NET: -4 mL      PHYSICAL EXAM:  General: NAD, awake, alert  Head: Anterior scalp surgical site c/d/i  ENT: Moist mucous membranes  Neck: Supple, no JVD  Lungs: Clear to auscultation bilaterally, no wheezes  Cardio: S1 S2, regular rate and rhythm  Abdomen: Soft, nontender, nondistended, bowel sounds present  Extremities: No calf tenderness, no pitting edema    LABS:                        12.5   46.94 )-----------( 163      ( 24 Jul 2023 06:04 )             39.8       07-24    140  |  107  |  32  ----------------------------<  107  4.1   |  24  |  1.44    Ca    9.1      24 Jul 2023 06:04    TPro  6.4  /  Alb  3.4  /  TBili  0.6  /  DBili  x   /  AST  16  /  ALT  6   /  AlkPhos  102  07-22            Urinalysis Basic - ( 24 Jul 2023 06:04 )    Color: x / Appearance: x / SG: x / pH: x  Gluc: 107 mg/dL / Ketone: x  / Bili: x / Urobili: x   Blood: x / Protein: x / Nitrite: x   Leuk Esterase: x / RBC: x / WBC x   Sq Epi: x / Non Sq Epi: x / Bacteria: x        COVID-19 PCR: John (07-21-23 @ 23:07)      RADIOLOGY & ADDITIONAL TESTS:     Care Discussed with Consultants/Other Providers: PM&R

## 2023-07-24 NOTE — DIETITIAN INITIAL EVALUATION ADULT - FACTORS AFF FOOD INTAKE
States Good PO Intake/Appetite over Last Week Denies Changes in Meal Consumption Recently (Per Patient)/none

## 2023-07-24 NOTE — DIETITIAN INITIAL EVALUATION ADULT - NS FNS DIET ORDER
Regular Diet (IDDSI Level 7) w/ Thin Liquids (IDDSI Level 0)   Education Provided on Proper Nutrition

## 2023-07-24 NOTE — DIETITIAN INITIAL EVALUATION ADULT - ORAL INTAKE PTA/DIET HISTORY
Patient Does Not Follow Diet @Home  Consumes 3 Meals a Day   Wife Usually Cooks For Patient   Does Take Vitamin D Supplements @Home

## 2023-07-25 PROCEDURE — 99232 SBSQ HOSP IP/OBS MODERATE 35: CPT

## 2023-07-25 RX ADMIN — VALSARTAN 160 MILLIGRAM(S): 80 TABLET ORAL at 05:36

## 2023-07-25 RX ADMIN — Medication 2000 UNIT(S): at 12:03

## 2023-07-25 RX ADMIN — FINASTERIDE 5 MILLIGRAM(S): 5 TABLET, FILM COATED ORAL at 12:03

## 2023-07-25 RX ADMIN — Medication 88 MICROGRAM(S): at 06:34

## 2023-07-25 RX ADMIN — Medication 300 MILLIGRAM(S): at 12:03

## 2023-07-25 RX ADMIN — ENOXAPARIN SODIUM 40 MILLIGRAM(S): 100 INJECTION SUBCUTANEOUS at 22:04

## 2023-07-25 RX ADMIN — Medication 100 MILLIGRAM(S): at 06:34

## 2023-07-25 RX ADMIN — SIMVASTATIN 10 MILLIGRAM(S): 20 TABLET, FILM COATED ORAL at 22:04

## 2023-07-25 NOTE — PROGRESS NOTE ADULT - SUBJECTIVE AND OBJECTIVE BOX
HPI:  Case of a 77 year old right handed male patient with past medical history of CAD/MI S/P PCI x 2 6/2022, dementia, HTN, HLD, Anxiety, CHF w/ EF 25-30 / V Fib Arrest S/P AICD, Renal Ca S/P Lap Rt Radical, BPH s/p TURP 2022, CKD III, CLL, Hypothyroidism, Gout, and Parkinson's misdiagnosis now completely weaned off sinemet, and NPH who presented to [facility] on 7/19 for VPS placement. He complained of gait impairment, difficulty with memory, and urinary frequency/incontinence. MRI brain reported dilated ventricles suggestive of normal pressure hydrocephalus. He had and elective high volume LP on 7/14 with improvement in his cognition and gait. Baseline consisted of home ambulation with walker or cane. He underwent VPS placement on 7/19. Remainder of hospital course uncomplicated. Patient evaluated by PT/OT/PM&R and recommended for acute inpatient rehab. Patient is medically stable for discharge to [facility] on 7/21. (21 Jul 2023 15:01)      ___________________________________________________________________________    SUBJECTIVE/ROS  Patient was seen and evaluated at bedside today.  Reported no overnight events and is in no acute distress.  Evaluated by Neuropsychology and assessment appreciated.  Lymphocytosis since admission that is likely due to CLL.  Case to be reevaluated at Interdisciplinary Team meeting tomorrow.  Eager to participate on the recommended rehabilitation program.  Denies any CP, SOB, DESIR, palpitations, fever, chills, body aches, cough, congestion, or any other symptoms at this time.   ___________________________________________________________________________    Vital Signs Last 24 Hrs  T(C): 36.5 (25 Jul 2023 08:01), Max: 36.6 (24 Jul 2023 22:38)  T(F): 97.7 (25 Jul 2023 08:01), Max: 97.9 (24 Jul 2023 22:38)  HR: 65 (25 Jul 2023 08:01) (62 - 66)  BP: 163/89 (25 Jul 2023 08:01) (154/85 - 167/94)  BP(mean): --  RR: 16 (25 Jul 2023 08:01) (16 - 16)  SpO2: 96% (25 Jul 2023 08:01) (95% - 96%)    Parameters below as of 25 Jul 2023 08:01  Patient On (Oxygen Delivery Method): room air    ___________________________________________________________________________    LAB               12.5   46.94 )-----------( 163      ( 24 Jul 2023 06:04 )             39.8                         13.4   58.41 )-----------( 183      ( 23 Jul 2023 12:15 )             43.7                           12.8   56.95 )-----------( 154      ( 22 Jul 2023 07:50 )             41.6       07-24    140  |  107  |  32<H>  ----------------------------<  107<H>  4.1   |  24  |  1.44<H>    Ca    9.1      24 Jul 2023 06:04  ___________________________________________________________________________    MEDICATIONS  (STANDING):  allopurinol 300 milliGRAM(s) Oral daily  cholecalciferol 2000 Unit(s) Oral daily  enoxaparin Injectable 40 milliGRAM(s) SubCutaneous <User Schedule>  finasteride 5 milliGRAM(s) Oral daily  levothyroxine 88 MICROGram(s) Oral daily  melatonin 6 milliGRAM(s) Oral at bedtime  metoprolol succinate  milliGRAM(s) Oral daily  senna 2 Tablet(s) Oral at bedtime  simvastatin 10 milliGRAM(s) Oral at bedtime  valsartan 160 milliGRAM(s) Oral daily    MEDICATIONS  (PRN):  acetaminophen     Tablet .. 975 milliGRAM(s) Oral every 8 hours PRN Mild Pain (1 - 3), Moderate Pain (4 - 6), Severe Pain (7 - 10)  ___________________________________________________________________________    PHYSICAL EXAM:    Gen - NAD, Comfortable  HEENT - NCAT, EOMI, MMM. right fronto-parietal coma-shaped stapled surgical incision c/d/i  Neck - Supple, No limited ROM  Pulm - CTAB, No wheeze, No rhonchi, No crackles  Cardiovascular - RRR, S1S2  Chest - good chest expansion, good respiratory effort  Abdomen - Soft, NT/ND  Extremities - no edema, no calf tenderness  Neuro-     Cognitive - awake, alert, oriented to person, place, date,.  Able  to follow command     Communication - Fluent, Comprehensible     Attention: Intact,     Judgement: Impaired judgement     Memory: Recall 3 objects immediate, 0/3 three min later, Recent memory impaired     Cranial Nerves - CN 2-12 intact.      Motor - No focal deficits.                     LEFT    UE - ShAB 5/5, EF 5/5, EE 5/5,  5/5                    RIGHT UE - ShAB 5/5, EF 5/5, EE 5/5,   5/5                    LEFT    LE - HF 5/5, KE 5/5, DF 5/5, PF 5/5                    RIGHT LE - HF 5/5, KE 5/5, DF 5/5, PF 5/5        Sensory - Intact to LT     Reflexes - DTR Intact     Coordination - FTN intact      Tone - normal  Psychiatric - Mood stable, Affect WNL  Skin:  right fronto-parietal coma-shaped stapled surgical incision c/d/i CORNELL, abd incision sites x 2 with steri-strips, periumbilical ecchymosis.  ___________________________________________________________________________ HPI:  Case of a 77 year old right handed male patient with past medical history of CAD/MI S/P PCI x 2 6/2022, dementia, HTN, HLD, Anxiety, CHF w/ EF 25-30 / V Fib Arrest S/P AICD, Renal Ca S/P Lap Rt Radical, BPH s/p TURP 2022, CKD III, CLL, Hypothyroidism, Gout, and Parkinson's misdiagnosis now completely weaned off sinemet, and NPH who presented to [facility] on 7/19 for VPS placement. He complained of gait impairment, difficulty with memory, and urinary frequency/incontinence. MRI brain reported dilated ventricles suggestive of normal pressure hydrocephalus. He had and elective high volume LP on 7/14 with improvement in his cognition and gait. Baseline consisted of home ambulation with walker or cane. He underwent VPS placement on 7/19. Remainder of hospital course uncomplicated. Patient evaluated by PT/OT/PM&R and recommended for acute inpatient rehab. Patient is medically stable for discharge to [facility] on 7/21. (21 Jul 2023 15:01)      ___________________________________________________________________________    SUBJECTIVE/ROS  Patient was seen and evaluated at bedside today.  Reported no overnight events and is in no acute distress.  Evaluated by Neuropsychology and assessment appreciated.  Lymphocytosis since admission that is likely due to CLL.  Case to be reevaluated at Interdisciplinary Team meeting today.  Eager to participate on the recommended rehabilitation program.  Denies any CP, SOB, DESIR, palpitations, fever, chills, body aches, cough, congestion, or any other symptoms at this time.   ___________________________________________________________________________    Vital Signs Last 24 Hrs  T(C): 36.5 (25 Jul 2023 08:01), Max: 36.6 (24 Jul 2023 22:38)  T(F): 97.7 (25 Jul 2023 08:01), Max: 97.9 (24 Jul 2023 22:38)  HR: 65 (25 Jul 2023 08:01) (62 - 66)  BP: 163/89 (25 Jul 2023 08:01) (154/85 - 167/94)  BP(mean): --  RR: 16 (25 Jul 2023 08:01) (16 - 16)  SpO2: 96% (25 Jul 2023 08:01) (95% - 96%)    Parameters below as of 25 Jul 2023 08:01  Patient On (Oxygen Delivery Method): room air    ___________________________________________________________________________    LAB               12.5   46.94 )-----------( 163      ( 24 Jul 2023 06:04 )             39.8                         13.4   58.41 )-----------( 183      ( 23 Jul 2023 12:15 )             43.7                           12.8   56.95 )-----------( 154      ( 22 Jul 2023 07:50 )             41.6       07-24    140  |  107  |  32<H>  ----------------------------<  107<H>  4.1   |  24  |  1.44<H>    Ca    9.1      24 Jul 2023 06:04  ___________________________________________________________________________    MEDICATIONS  (STANDING):  allopurinol 300 milliGRAM(s) Oral daily  cholecalciferol 2000 Unit(s) Oral daily  enoxaparin Injectable 40 milliGRAM(s) SubCutaneous <User Schedule>  finasteride 5 milliGRAM(s) Oral daily  levothyroxine 88 MICROGram(s) Oral daily  melatonin 6 milliGRAM(s) Oral at bedtime  metoprolol succinate  milliGRAM(s) Oral daily  senna 2 Tablet(s) Oral at bedtime  simvastatin 10 milliGRAM(s) Oral at bedtime  valsartan 160 milliGRAM(s) Oral daily    MEDICATIONS  (PRN):  acetaminophen     Tablet .. 975 milliGRAM(s) Oral every 8 hours PRN Mild Pain (1 - 3), Moderate Pain (4 - 6), Severe Pain (7 - 10)    ___________________________________________________________________________    PHYSICAL EXAM:    Gen - NAD, Comfortable  HEENT - NCAT, EOMI, MMM. right fronto-parietal coma-shaped stapled surgical incision c/d/i  Neck - Supple, No limited ROM  Pulm - good respiratory effort, good chest expansion  Cardiovascular - warm and well perfused  Abdomen - Soft, NT/ND  Extremities - no edema, no calf tenderness  Neuro-     Cognitive - awake, alert, oriented to person, place, date,.  Able  to follow command     Communication - Fluent, Comprehensible     Attention: Intact,     Judgement: Impaired judgement      Motor - No focal deficits.                     LEFT    UE - ShAB 5/5, EF 5/5, EE 5/5,  5/5                    RIGHT UE - ShAB 5/5, EF 5/5, EE 5/5,   5/5                    LEFT    LE - HF 5/5, KE 5/5, DF 5/5, PF 5/5                    RIGHT LE - HF 5/5, KE 5/5, DF 5/5, PF 5/5        Sensory - Intact to LT  Psychiatric - Mood stable, Affect WNL  Skin:  right fronto-parietal coma-shaped stapled surgical incision c/d/i CORNELL, abd incision sites x 2 with steri-strips, periumbilical ecchymosis, no surrounding erythema  ___________________________________________________________________________ HPI:  Case of a 77 year old right handed male patient with past medical history of CAD/MI S/P PCI x 2 6/2022, dementia, HTN, HLD, Anxiety, CHF w/ EF 25-30 / V Fib Arrest S/P AICD, Renal Ca S/P Lap Rt Radical, BPH s/p TURP 2022, CKD III, CLL, Hypothyroidism, Gout, and Parkinson's misdiagnosis now completely weaned off sinemet, and NPH who presented to [facility] on 7/19 for VPS placement. He complained of gait impairment, difficulty with memory, and urinary frequency/incontinence. MRI brain reported dilated ventricles suggestive of normal pressure hydrocephalus. He had and elective high volume LP on 7/14 with improvement in his cognition and gait. Baseline consisted of home ambulation with walker or cane. He underwent VPS placement on 7/19. Remainder of hospital course uncomplicated. Patient evaluated by PT/OT/PM&R and recommended for acute inpatient rehab. Patient is medically stable for discharge to [facility] on 7/21. (21 Jul 2023 15:01)    TDD: 7/29 Home  ___________________________________________________________________________    SUBJECTIVE/ROS  Patient was seen and evaluated at bedside today.  Reported no overnight events and is in no acute distress.  Tolerating rehab and cooperative today.  Case was discussed at Interdisciplinary Team meeting today.  A tentative discharge date is outlined above.  Patient is eager to continue participation on the recommended rehabilitation program.  Denies any CP, SOB, DESIR, palpitations, fever, chills, body aches, cough, congestion, or any other symptoms at this time.   ___________________________________________________________________________    Vital Signs Last 24 Hrs  T(C): 36.5 (25 Jul 2023 08:01), Max: 36.6 (24 Jul 2023 22:38)  T(F): 97.7 (25 Jul 2023 08:01), Max: 97.9 (24 Jul 2023 22:38)  HR: 65 (25 Jul 2023 08:01) (62 - 66)  BP: 163/89 (25 Jul 2023 08:01) (154/85 - 167/94)  RR: 16 (25 Jul 2023 08:01) (16 - 16)  SpO2: 96% (25 Jul 2023 08:01) (95% - 96%)    ___________________________________________________________________________    LAB               12.5   46.94 )-----------( 163      ( 24 Jul 2023 06:04 )             39.8       07-24    140  |  107  |  32<H>  ----------------------------<  107<H>  4.1   |  24  |  1.44<H>    Ca    9.1      24 Jul 2023 06:04  ___________________________________________________________________________    MEDICATIONS  (STANDING):  allopurinol 300 milliGRAM(s) Oral daily  cholecalciferol 2000 Unit(s) Oral daily  enoxaparin Injectable 40 milliGRAM(s) SubCutaneous <User Schedule>  finasteride 5 milliGRAM(s) Oral daily  levothyroxine 88 MICROGram(s) Oral daily  melatonin 6 milliGRAM(s) Oral at bedtime  metoprolol succinate  milliGRAM(s) Oral daily  senna 2 Tablet(s) Oral at bedtime  simvastatin 10 milliGRAM(s) Oral at bedtime  valsartan 160 milliGRAM(s) Oral daily    MEDICATIONS  (PRN):  acetaminophen     Tablet .. 975 milliGRAM(s) Oral every 8 hours PRN Mild Pain (1 - 3), Moderate Pain (4 - 6), Severe Pain (7 - 10)    ___________________________________________________________________________    PHYSICAL EXAM:    Gen - NAD, Comfortable  HEENT - NCAT, EOMI, MMM. right fronto-parietal coma-shaped stapled surgical incision c/d/i  Neck - Supple, No limited ROM  Pulm - good respiratory effort, good chest expansion  Cardiovascular - warm and well perfused  Abdomen - Soft, NT/ND  Extremities - no edema, no calf tenderness  Neuro-     Cognitive - awake, alert, oriented to person, place, date,.  Able  to follow command     Communication - Fluent, Comprehensible     Attention: Intact,     Judgement: Impaired judgement      Motor - No focal deficits.                     LEFT    UE - ShAB 5/5, EF 5/5, EE 5/5,  5/5                    RIGHT UE - ShAB 5/5, EF 5/5, EE 5/5,   5/5                    LEFT    LE - HF 5/5, KE 5/5, DF 5/5, PF 5/5                    RIGHT LE - HF 5/5, KE 5/5, DF 5/5, PF 5/5        Sensory - Intact to LT  Psychiatric - Mood stable, Affect WNL  Skin:  right fronto-parietal coma-shaped stapled surgical incision c/d/i CORNELL, abd incision sites x 2 with steri-strips, periumbilical ecchymosis, no surrounding erythema  ___________________________________________________________________________

## 2023-07-25 NOTE — PROGRESS NOTE ADULT - ASSESSMENT
Assessment/Plan:  Case of a 77 year old right handed male patient with past medical history of CAD/MI S/P PCI x 2 6/2022, dementia, HTN, HLD, Anxiety, CHF w/ EF 25-30 / V Fib Arrest S/P AICD, Renal Ca S/P Lap Rt Radical, BPH s/p TURP 2022, CKD III, CLL, Hypothyroidism, Gout, and Parkinson's misdiagnosis now completely weaned off sinemet, and NPH who is admitted for Acute Inpatient Rehabilitation with a multidisciplinary rehab program at [facility] with functional impairments in ADLs and mobility secondary to normal pressure hydrocephalus surgically treated with VPS placement on 7/19/23.    NPH s/p VPS placement  - Post op CTH 7/19 stable  - memory deficits  - Fall/aspiration precautions  - Start comprehensive rehab program of PT/OT/SLP - 3 hours a day, 5 days a week. P&O as needed     Lymphocytosis   - Likely due to CLL  - Typically with low leukostasis risk    CAD   - Hx of V fib arrest s/p AICD placement  - Toprol 100mg qd    CHF  - EF 25-30% (unknown date)  - Off diuretics  - Caution with aggressive hydration    HTN  - Toprol 100mg qd  - Valsartan 160mg qd  - Monitor BP    HLD  - cont simvastatin 10 mg QHS    Pain  - Tylenol PRN    Sleep  - Melatonin 6mg nightly PRN     GI / Bowel  - Senna qHS     / Bladder  - Currently patient voids independently. (+) incontinence    Skin / Pressure injury  - Skin assessment on admission performed : right cranial VPS incision with staples CORNELL, abd incision sites x 2 with steristrips, periumbilical ecchymosis.  - Turn q2 hours in bed while awake, air mattress  - nursing to monitor skin q Shift  - soft heel protectors  - skin barrier cream PRN    Diet/Dysphagia:  - Diet Consistency: Regular    DVT prophylaxis:   - Lovenox 40mg     Outpatient Follow-up:  Clayton Kirby  Neurosurgery  [address], [address] [place] [address]  Phone: [phone]  Fax: [phone]    Luz Art   Surgery  [address], [address]  [place] [address]  Phone: [phone]  Fax: [phone]  ---------------

## 2023-07-26 PROCEDURE — 99232 SBSQ HOSP IP/OBS MODERATE 35: CPT

## 2023-07-26 RX ADMIN — Medication 88 MICROGRAM(S): at 08:22

## 2023-07-26 RX ADMIN — Medication 2000 UNIT(S): at 11:11

## 2023-07-26 RX ADMIN — SIMVASTATIN 10 MILLIGRAM(S): 20 TABLET, FILM COATED ORAL at 21:29

## 2023-07-26 RX ADMIN — VALSARTAN 160 MILLIGRAM(S): 80 TABLET ORAL at 08:22

## 2023-07-26 RX ADMIN — SENNA PLUS 2 TABLET(S): 8.6 TABLET ORAL at 21:28

## 2023-07-26 RX ADMIN — FINASTERIDE 5 MILLIGRAM(S): 5 TABLET, FILM COATED ORAL at 11:10

## 2023-07-26 RX ADMIN — Medication 100 MILLIGRAM(S): at 08:22

## 2023-07-26 RX ADMIN — Medication 300 MILLIGRAM(S): at 11:10

## 2023-07-26 RX ADMIN — ENOXAPARIN SODIUM 40 MILLIGRAM(S): 100 INJECTION SUBCUTANEOUS at 21:29

## 2023-07-26 NOTE — PROGRESS NOTE ADULT - ASSESSMENT
Assessment/Plan:  Case of a 77 year old right handed male patient with past medical history of CAD/MI S/P PCI x 2 6/2022, dementia, HTN, HLD, Anxiety, CHF w/ EF 25-30 / V Fib Arrest S/P AICD, Renal Ca S/P Lap Rt Radical, BPH s/p TURP 2022, CKD III, CLL, Hypothyroidism, Gout, and Parkinson's misdiagnosis now completely weaned off sinemet, and NPH who is admitted for Acute Inpatient Rehabilitation with a multidisciplinary rehab program at [facility] with functional impairments in ADLs and mobility secondary to normal pressure hydrocephalus surgically treated with VPS placement on 7/19/23.    NPH s/p VPS placement  - Post op CTH 7/19 stable  - memory deficits  - Fall/aspiration precautions  - Start comprehensive rehab program of PT/OT/SLP - 3 hours a day, 5 days a week. P&O as needed     Lymphocytosis   - Likely due to CLL  - Typically with low leukostasis risk    CAD   - Hx of V fib arrest s/p AICD placement  - Toprol 100mg qd    CHF  - EF 25-30% (unknown date)  - Off diuretics  - Caution with aggressive hydration    HTN  - Toprol 100mg qd  - Valsartan 160mg qd  - Monitor BP    HLD  - cont simvastatin 10 mg QHS    Pain  - Tylenol PRN    Sleep  - Melatonin 6mg nightly PRN     GI / Bowel  - Senna qHS     / Bladder  - Currently patient voids independently. (+) incontinence    Skin / Pressure injury  - Skin assessment on admission performed : right cranial VPS incision with staples CORNELL, abd incision sites x 2 with steristrips, periumbilical ecchymosis.  - Turn q2 hours in bed while awake, air mattress  - nursing to monitor skin q Shift  - soft heel protectors  - skin barrier cream PRN    Diet/Dysphagia:  - Diet Consistency: Regular    DVT prophylaxis:   - Lovenox 40mg     Outpatient Follow-up:  Clayton Kirby  Neurosurgery  [address], [address] [place] [address]  Phone: [phone]  Fax: [phone]    Luz Art   Surgery  [address], [address]  [place] [address]  Phone: [phone]  Fax: [phone]  ---------------

## 2023-07-26 NOTE — PROGRESS NOTE ADULT - SUBJECTIVE AND OBJECTIVE BOX
HPI:  Case of a 77 year old right handed male patient with past medical history of CAD/MI S/P PCI x 2 6/2022, dementia, HTN, HLD, Anxiety, CHF w/ EF 25-30 / V Fib Arrest S/P AICD, Renal Ca S/P Lap Rt Radical, BPH s/p TURP 2022, CKD III, CLL, Hypothyroidism, Gout, and Parkinson's misdiagnosis now completely weaned off sinemet, and NPH who presented to [facility] on 7/19 for VPS placement. He complained of gait impairment, difficulty with memory, and urinary frequency/incontinence. MRI brain reported dilated ventricles suggestive of normal pressure hydrocephalus. He had and elective high volume LP on 7/14 with improvement in his cognition and gait. Baseline consisted of home ambulation with walker or cane. He underwent VPS placement on 7/19. Remainder of hospital course uncomplicated. Patient evaluated by PT/OT/PM&R and recommended for acute inpatient rehab. Patient is medically stable for discharge to [facility] on 7/21. (21 Jul 2023 15:01)    TDD: 7/29 Home  ___________________________________________________________________________    SUBJECTIVE/ROS  Patient was seen and evaluated at bedside today.  Reported no overnight events and is in no acute distress.  Tolerating rehab and remains cooperative.  Case was discussed at Interdisciplinary Team meeting yesterday.  A tentative discharge date is outlined above.  Expressed wish for medications to be sent to a local pharmacy.  Patient is eager to continue participation on the recommended rehabilitation program.  Denies any CP, SOB, DESIR, palpitations, fever, chills, body aches, cough, congestion, or any other symptoms at this time.   ___________________________________________________________________________    Vital Signs Last 24 Hrs  T(C): 36.8 (26 Jul 2023 08:21), Max: 36.8 (26 Jul 2023 08:21)  T(F): 98.3 (26 Jul 2023 08:21), Max: 98.3 (26 Jul 2023 08:21)  HR: 69 (26 Jul 2023 10:42) (69 - 75)  BP: 148/80 (26 Jul 2023 10:42) (139/72 - 180/92)  RR: 16 (26 Jul 2023 08:21) (16 - 16)  SpO2: 96% (26 Jul 2023 08:21) (95% - 96%)    ___________________________________________________________________________    LAB               12.5   46.94 )-----------( 163      ( 24 Jul 2023 06:04 )             39.8       07-24    140  |  107  |  32<H>  ----------------------------<  107<H>  4.1   |  24  |  1.44<H>    Ca    9.1      24 Jul 2023 06:04  ___________________________________________________________________________    MEDICATIONS  (STANDING):  allopurinol 300 milliGRAM(s) Oral daily  cholecalciferol 2000 Unit(s) Oral daily  enoxaparin Injectable 40 milliGRAM(s) SubCutaneous <User Schedule>  finasteride 5 milliGRAM(s) Oral daily  levothyroxine 88 MICROGram(s) Oral daily  melatonin 6 milliGRAM(s) Oral at bedtime  metoprolol succinate  milliGRAM(s) Oral daily  senna 2 Tablet(s) Oral at bedtime  simvastatin 10 milliGRAM(s) Oral at bedtime  valsartan 160 milliGRAM(s) Oral daily    MEDICATIONS  (PRN):  acetaminophen     Tablet .. 975 milliGRAM(s) Oral every 8 hours PRN Mild Pain (1 - 3), Moderate Pain (4 - 6), Severe Pain (7 - 10)    ___________________________________________________________________________    PHYSICAL EXAM:    Gen - NAD, Comfortable  HEENT - NCAT, EOMI, MMM. right fronto-parietal coma-shaped stapled surgical incision c/d/i  Neck - Supple, No limited ROM  Pulm - good respiratory effort, good chest expansion  Cardiovascular - warm and well perfused  Abdomen - Soft, NT/ND  Extremities - no edema, no calf tenderness  Neuro-     Cognitive - awake, alert, oriented to person, place, date,.  Able  to follow command     Communication - Fluent, Comprehensible     Attention: Intact,     Judgement: Impaired judgement      Motor - No focal deficits.                     LEFT    UE - ShAB 5/5, EF 5/5, EE 5/5,  5/5                    RIGHT UE - ShAB 5/5, EF 5/5, EE 5/5,   5/5                    LEFT    LE - HF 5/5, KE 5/5, DF 5/5, PF 5/5                    RIGHT LE - HF 5/5, KE 5/5, DF 5/5, PF 5/5        Sensory - Intact to LT  Psychiatric - Mood stable, Affect WNL  Skin:  right fronto-parietal coma-shaped stapled surgical incision c/d/i CORNELL, abd incision sites x 2 with steri-strips, periumbilical ecchymosis, no surrounding erythema  ___________________________________________________________________________

## 2023-07-27 ENCOUNTER — TRANSCRIPTION ENCOUNTER (OUTPATIENT)
Age: 78
End: 2023-07-27

## 2023-07-27 ENCOUNTER — RX RENEWAL (OUTPATIENT)
Age: 78
End: 2023-07-27

## 2023-07-27 LAB
ANION GAP SERPL CALC-SCNC: 8 MMOL/L — SIGNIFICANT CHANGE UP (ref 5–17)
BUN SERPL-MCNC: 32 MG/DL — HIGH (ref 7–23)
CALCIUM SERPL-MCNC: 9.1 MG/DL — SIGNIFICANT CHANGE UP (ref 8.4–10.5)
CHLORIDE SERPL-SCNC: 107 MMOL/L — SIGNIFICANT CHANGE UP (ref 96–108)
CO2 SERPL-SCNC: 27 MMOL/L — SIGNIFICANT CHANGE UP (ref 22–31)
CREAT SERPL-MCNC: 1.54 MG/DL — HIGH (ref 0.5–1.3)
EGFR: 46 ML/MIN/1.73M2 — LOW
GLUCOSE SERPL-MCNC: 101 MG/DL — HIGH (ref 70–99)
HCT VFR BLD CALC: 41.7 % — SIGNIFICANT CHANGE UP (ref 39–50)
HGB BLD-MCNC: 12.7 G/DL — LOW (ref 13–17)
MCHC RBC-ENTMCNC: 30.5 GM/DL — LOW (ref 32–36)
MCHC RBC-ENTMCNC: 31.9 PG — SIGNIFICANT CHANGE UP (ref 27–34)
MCV RBC AUTO: 104.8 FL — HIGH (ref 80–100)
PLATELET # BLD AUTO: 170 K/UL — SIGNIFICANT CHANGE UP (ref 150–400)
POTASSIUM SERPL-MCNC: 4.5 MMOL/L — SIGNIFICANT CHANGE UP (ref 3.5–5.3)
POTASSIUM SERPL-SCNC: 4.5 MMOL/L — SIGNIFICANT CHANGE UP (ref 3.5–5.3)
RBC # BLD: 3.98 M/UL — LOW (ref 4.2–5.8)
RBC # FLD: 14.1 % — SIGNIFICANT CHANGE UP (ref 10.3–14.5)
SODIUM SERPL-SCNC: 142 MMOL/L — SIGNIFICANT CHANGE UP (ref 135–145)
WBC # BLD: 51.83 K/UL — CRITICAL HIGH (ref 3.8–10.5)
WBC # FLD AUTO: 51.83 K/UL — CRITICAL HIGH (ref 3.8–10.5)

## 2023-07-27 PROCEDURE — 99232 SBSQ HOSP IP/OBS MODERATE 35: CPT

## 2023-07-27 RX ORDER — LEVOTHYROXINE SODIUM 125 MCG
1 TABLET ORAL
Qty: 30 | Refills: 0
Start: 2023-07-27 | End: 2023-08-25

## 2023-07-27 RX ORDER — VALSARTAN 80 MG/1
1 TABLET ORAL
Refills: 0 | DISCHARGE

## 2023-07-27 RX ORDER — VALSARTAN 80 MG/1
1 TABLET ORAL
Qty: 30 | Refills: 0
Start: 2023-07-27 | End: 2023-08-25

## 2023-07-27 RX ORDER — SENNA PLUS 8.6 MG/1
2 TABLET ORAL
Qty: 0 | Refills: 0 | DISCHARGE
Start: 2023-07-27

## 2023-07-27 RX ORDER — ALLOPURINOL 300 MG
1 TABLET ORAL
Qty: 30 | Refills: 0
Start: 2023-07-27 | End: 2023-08-25

## 2023-07-27 RX ORDER — CHOLECALCIFEROL (VITAMIN D3) 125 MCG
1 CAPSULE ORAL
Qty: 30 | Refills: 0
Start: 2023-07-27 | End: 2023-08-25

## 2023-07-27 RX ORDER — METOPROLOL TARTRATE 50 MG
1 TABLET ORAL
Qty: 30 | Refills: 0
Start: 2023-07-27 | End: 2023-08-25

## 2023-07-27 RX ORDER — VALSARTAN 80 MG/1
1 TABLET ORAL
Qty: 0 | Refills: 0
Start: 2023-07-27

## 2023-07-27 RX ORDER — SIMVASTATIN 20 MG/1
1 TABLET, FILM COATED ORAL
Qty: 30 | Refills: 0
Start: 2023-07-27 | End: 2023-08-25

## 2023-07-27 RX ORDER — LANOLIN ALCOHOL/MO/W.PET/CERES
2 CREAM (GRAM) TOPICAL
Qty: 60 | Refills: 0
Start: 2023-07-27 | End: 2023-08-25

## 2023-07-27 RX ORDER — ACETAMINOPHEN 500 MG
3 TABLET ORAL
Qty: 0 | Refills: 0 | DISCHARGE
Start: 2023-07-27

## 2023-07-27 RX ORDER — FINASTERIDE 5 MG/1
1 TABLET, FILM COATED ORAL
Qty: 30 | Refills: 0
Start: 2023-07-27 | End: 2023-08-25

## 2023-07-27 RX ORDER — CHOLECALCIFEROL (VITAMIN D3) 125 MCG
1 CAPSULE ORAL
Qty: 0 | Refills: 0 | DISCHARGE

## 2023-07-27 RX ORDER — SIMVASTATIN 20 MG/1
1 TABLET, FILM COATED ORAL
Qty: 0 | Refills: 0
Start: 2023-07-27

## 2023-07-27 RX ADMIN — SENNA PLUS 2 TABLET(S): 8.6 TABLET ORAL at 21:40

## 2023-07-27 RX ADMIN — Medication 100 MILLIGRAM(S): at 06:04

## 2023-07-27 RX ADMIN — FINASTERIDE 5 MILLIGRAM(S): 5 TABLET, FILM COATED ORAL at 11:23

## 2023-07-27 RX ADMIN — Medication 6 MILLIGRAM(S): at 21:40

## 2023-07-27 RX ADMIN — VALSARTAN 160 MILLIGRAM(S): 80 TABLET ORAL at 06:03

## 2023-07-27 RX ADMIN — Medication 300 MILLIGRAM(S): at 11:25

## 2023-07-27 RX ADMIN — SIMVASTATIN 10 MILLIGRAM(S): 20 TABLET, FILM COATED ORAL at 21:40

## 2023-07-27 RX ADMIN — Medication 2000 UNIT(S): at 11:24

## 2023-07-27 RX ADMIN — ENOXAPARIN SODIUM 40 MILLIGRAM(S): 100 INJECTION SUBCUTANEOUS at 21:39

## 2023-07-27 RX ADMIN — Medication 88 MICROGRAM(S): at 06:04

## 2023-07-27 NOTE — DISCHARGE NOTE PROVIDER - NSDCCPCAREPLAN_GEN_ALL_CORE_FT
PRINCIPAL DISCHARGE DIAGNOSIS  Diagnosis: Normal pressure hydrocephalus  Assessment and Plan of Treatment: Normal pressure hydrocephalus is a brain disorder in which excess cerebrospinal fluid accumulates in the brain's ventricles, which are fluid-filled chambers. Normal pressure hydrocephalus is called "normal pressure" because despite the excess fluid, CSF pressure as measured during a spinal tap is often normal. In your case, surgical placement of a ventriculoperitoneal shunt was required to treat your symptom. Follow-up required with your neurosurgeon.

## 2023-07-27 NOTE — DISCHARGE NOTE PROVIDER - NSDCFUSCHEDAPPT_GEN_ALL_CORE_FT
Reba Mullins  [facility]  NEUROSURG [id number]  Scheduled Appointment: 08/07/2023

## 2023-07-27 NOTE — DISCHARGE NOTE PROVIDER - NSDCMRMEDTOKEN_GEN_ALL_CORE_FT
acetaminophen 500 mg oral tablet: 2 tab(s) orally every 8 hours As needed Moderate Pain (4 - 6)  allopurinol 300 mg oral tablet: 1 tab(s) orally once a day  calcium carbonate 500 mg (200 mg elemental calcium) oral tablet, chewable: 1 tab(s) orally 3 times a day, As needed, Heartburn  enoxaparin: 40 milligram(s) subcutaneous once a day for DVT prevention while at rehab  finasteride 5 mg oral tablet: 1 tab(s) orally once a day  levothyroxine 88 mcg (0.088 mg) oral tablet: 1 tab(s) orally once a day  melatonin 5 mg oral tablet: 1 tab(s) orally once a day (at bedtime) as needed for  insomnia  metoprolol succinate 100 mg oral tablet, extended release: 1 tab(s) orally once a day  senna leaf extract oral tablet: 2 tab(s) orally once a day (at bedtime)  simvastatin 10 mg oral tablet: 1 tab(s) orally once a day (at bedtime)  triamcinolone 0.025% topical cream: Apply topically to affected area 3 times a day, As Needed - for rash  valsartan 160 mg oral tablet: 1 orally once a day  Vitamin B12: 1 tab(s) orally once a day  Vitamin D3 50 mcg (2000 intl units) oral tablet: 1 tab(s) orally once a day   acetaminophen 325 mg oral tablet: 3 tab(s) orally every 8 hours As needed Mild Pain (1 - 3), Moderate Pain (4 - 6), Severe Pain (7 - 10)  allopurinol 300 mg oral tablet: 1 tab(s) orally once a day  cholecalciferol oral tablet: 1 tab(s) orally once a day 2000 unit(s) orally once a day  finasteride 5 mg oral tablet: 1 tab(s) orally once a day  levothyroxine 88 mcg (0.088 mg) oral tablet: 1 tab(s) orally once a day  melatonin 3 mg oral tablet: 2 tab(s) orally once a day (at bedtime)  metoprolol succinate 100 mg oral tablet, extended release: 1 tab(s) orally once a day  senna leaf extract oral tablet: 2 tab(s) orally once a day (at bedtime)  simvastatin 10 mg oral tablet: 1 tab(s) orally once a day (at bedtime)  valsartan 160 mg oral tablet: 1 tab(s) orally once a day   acetaminophen 325 mg oral tablet: 3 tab(s) orally every 8 hours As needed Mild Pain (1 - 3), Moderate Pain (4 - 6), Severe Pain (7 - 10)  allopurinol 300 mg oral tablet: 1 tab(s) orally once a day  cholecalciferol oral tablet: 1 tab(s) orally once a day 2000 unit(s) orally once a day  finasteride 5 mg oral tablet: 1 tab(s) orally once a day  levothyroxine 88 mcg (0.088 mg) oral tablet: 1 tab(s) orally once a day  melatonin 3 mg oral tablet: 3 tab(s) orally once a day (at bedtime)  melatonin 3 mg oral tablet: 2 tab(s) orally once a day (at bedtime)  metoprolol succinate 100 mg oral tablet, extended release: 1 tab(s) orally once a day  senna leaf extract oral tablet: 2 tab(s) orally once a day (at bedtime)  simvastatin 10 mg oral tablet: 1 tab(s) orally once a day (at bedtime)  valsartan 160 mg oral tablet: 1 tab(s) orally once a day

## 2023-07-27 NOTE — DISCHARGE NOTE PROVIDER - CARE PROVIDERS DIRECT ADDRESSES
[email or web address],DirectAddress_Unknown,DirectAddress_Unknown

## 2023-07-27 NOTE — DISCHARGE NOTE PROVIDER - PROVIDER TOKENS
PROVIDER:[TOKEN:[[id number]]],PROVIDER:[TOKEN:[[id number]]],PROVIDER:[TOKEN:[[id number]]]

## 2023-07-27 NOTE — DISCHARGE NOTE PROVIDER - HOSPITAL COURSE
Case of a 77 year old right handed male patient with past medical history of CAD/MI S/P PCI x 2 6/2022, dementia, HTN, HLD, Anxiety, CHF w/ EF 25-30 / V Fib Arrest S/P AICD, Renal Ca S/P Lap Rt Radical, BPH s/p TURP 2022, CKD III, CLL, Hypothyroidism, Gout, and Parkinson's misdiagnosis now completely weaned off sinemet, and NPH who presented to [facility] on 7/19 for VPS placement. He complained of gait impairment, difficulty with memory, and urinary frequency/incontinence. MRI brain reported dilated ventricles suggestive of normal pressure hydrocephalus. He had and elective high volume LP on 7/14 with improvement in his cognition and gait. Baseline consisted of home ambulation with walker or cane. He underwent VPS placement on 7/19. Remainder of hospital course uncomplicated. Patient evaluated by PT/OT/PM&R and recommended for acute inpatient rehab. Patient is medically stable for discharge to [facility] on 7/21.     Patient participated in daily therapies and made good functional gains.  Rehab course notable for    Patient tolerated course of inpatient PT/OT/SLP rehab with significant functional improvements. Patient seen and examined on day of discharge.  Medications, medication side effects, and discharge instructions were reviewed with the patient, who expressed understanding of all information.  Patient was medically and functionally optimized and cleared for discharge.     Case of a 77 year old right handed male patient with past medical history of CAD/MI S/P PCI x 2 6/2022, dementia, HTN, HLD, Anxiety, CHF w/ EF 25-30 / V Fib Arrest S/P AICD, Renal Ca S/P Lap Rt Radical, BPH s/p TURP 2022, CKD III, CLL, Hypothyroidism, Gout, and Parkinson's misdiagnosis now completely weaned off sinemet, and NPH who presented to [facility] on 7/19 for VPS placement. He complained of gait impairment, difficulty with memory, and urinary frequency/incontinence. MRI brain reported dilated ventricles suggestive of normal pressure hydrocephalus. He had and elective high volume LP on 7/14 with improvement in his cognition and gait. Baseline consisted of home ambulation with walker or cane. He underwent VPS placement on 7/19. Remainder of hospital course uncomplicated. Patient evaluated by PT/OT/PM&R and recommended for acute inpatient rehab. Patient is medically stable for discharge to [facility] on 7/21.     Patient participated in daily therapies and made good functional gains.  Rehab course was uncomplicated.     Patient tolerated course of inpatient PT/OT/SLP rehab with significant functional improvements. Patient seen and examined on day of discharge.  Medications, medication side effects, and discharge instructions were reviewed with the patient, who expressed understanding of all information.  Patient was medically and functionally optimized and cleared for discharge.

## 2023-07-27 NOTE — PROGRESS NOTE ADULT - SUBJECTIVE AND OBJECTIVE BOX
HPI:  Case of a 77 year old right handed male patient with past medical history of CAD/MI S/P PCI x 2 6/2022, dementia, HTN, HLD, Anxiety, CHF w/ EF 25-30 / V Fib Arrest S/P AICD, Renal Ca S/P Lap Rt Radical, BPH s/p TURP 2022, CKD III, CLL, Hypothyroidism, Gout, and Parkinson's misdiagnosis now completely weaned off sinemet, and NPH who presented to [facility] on 7/19 for VPS placement. He complained of gait impairment, difficulty with memory, and urinary frequency/incontinence. MRI brain reported dilated ventricles suggestive of normal pressure hydrocephalus. He had and elective high volume LP on 7/14 with improvement in his cognition and gait. Baseline consisted of home ambulation with walker or cane. He underwent VPS placement on 7/19. Remainder of hospital course uncomplicated. Patient evaluated by PT/OT/PM&R and recommended for acute inpatient rehab. Patient is medically stable for discharge to [facility] on 7/21. (21 Jul 2023 15:01)    TDD: 7/29 Home  ___________________________________________________________________________    SUBJECTIVE/ROS  Patient was seen and evaluated at bedside today.  Reported no overnight events and is in no acute distress.  Tolerating rehab and remains cooperative.  Leukocytosis at baseline per CLL diagnosis.  A lengthy discussion took place with patient and his wife.  All questions were answered and they expressed understanding and agreement.   Counseling provided to not drive until cleared by Neurosurgeon as outpatient.  Patient is eager to continue participation on the recommended rehabilitation program.  Denies any CP, SOB, DESIR, palpitations, fever, chills, body aches, cough, congestion, or any other symptoms at this time.   ___________________________________________________________________________    Vital Signs Last 24 Hrs  T(C): 36.6 (27 Jul 2023 07:39), Max: 36.9 (26 Jul 2023 19:57)  T(F): 97.9 (27 Jul 2023 07:39), Max: 98.4 (26 Jul 2023 19:57)  HR: 58 (27 Jul 2023 07:39) (58 - 69)  BP: 143/82 (27 Jul 2023 07:39) (143/82 - 164/98)  RR: 16 (27 Jul 2023 07:39) (16 - 16)  SpO2: 95% (27 Jul 2023 07:39) (95% - 97%)    ___________________________________________________________________________    LAB                        12.7   51.83 )-----------( 170      ( 27 Jul 2023 06:55 )             41.7                  12.5   46.94 )-----------( 163      ( 24 Jul 2023 06:04 )             39.8       07-27    142  |  107  |  32<H>  ----------------------------<  101<H>  4.5   |  27  |  1.54<H>    Ca    9.1      27 Jul 2023 06:55      07-24    140  |  107  |  32<H>  ----------------------------<  107<H>  4.1   |  24  |  1.44<H>    Ca    9.1      24 Jul 2023 06:04  ___________________________________________________________________________    MEDICATIONS  (STANDING):  allopurinol 300 milliGRAM(s) Oral daily  cholecalciferol 2000 Unit(s) Oral daily  enoxaparin Injectable 40 milliGRAM(s) SubCutaneous <User Schedule>  finasteride 5 milliGRAM(s) Oral daily  levothyroxine 88 MICROGram(s) Oral daily  melatonin 6 milliGRAM(s) Oral at bedtime  metoprolol succinate  milliGRAM(s) Oral daily  senna 2 Tablet(s) Oral at bedtime  simvastatin 10 milliGRAM(s) Oral at bedtime  valsartan 160 milliGRAM(s) Oral daily    MEDICATIONS  (PRN):  acetaminophen     Tablet .. 975 milliGRAM(s) Oral every 8 hours PRN Mild Pain (1 - 3), Moderate Pain (4 - 6), Severe Pain (7 - 10)    ___________________________________________________________________________    PHYSICAL EXAM:    Gen - NAD, Comfortable  HEENT - NCAT, EOMI, MMM. right fronto-parietal coma-shaped stapled surgical incision c/d/i  Neck - Supple, No limited ROM  Pulm - good respiratory effort, good chest expansion  Cardiovascular - warm and well perfused  Abdomen - Soft, NT/ND  Extremities - no edema, no calf tenderness  Neuro-     Cognitive - awake, alert, oriented to person, place, date,.  Able  to follow command     Communication - Fluent, Comprehensible     Attention: Intact,     Judgement: Impaired judgement      Motor - No focal deficits.                     LEFT    UE - ShAB 5/5, EF 5/5, EE 5/5,  5/5                    RIGHT UE - ShAB 5/5, EF 5/5, EE 5/5,   5/5                    LEFT    LE - HF 5/5, KE 5/5, DF 5/5, PF 5/5                    RIGHT LE - HF 5/5, KE 5/5, DF 5/5, PF 5/5        Sensory - Intact to LT  Psychiatric - Mood stable, Affect WNL  Skin:  right fronto-parietal coma-shaped stapled surgical incision c/d/i CORNELL, abd incision sites x 2 with steri-strips, resolving periumbilical ecchymosis, no surrounding erythema  ___________________________________________________________________________

## 2023-07-27 NOTE — DISCHARGE NOTE PROVIDER - CARE PROVIDER_API CALL
Clayton Kirby  Neurosurgery  [address], [address] [place] [address]  Phone: [phone]  Fax: [phone]  Follow Up Time:     Luz Art   Surgery  [address], [address]  [place] [address]  Phone: [phone]  Fax: [phone]  Follow Up Time:     Pratik Gunderson  Physical/Rehab Medicine  [address] [place] [address]  Phone: [phone]  Fax: [phone]  Follow Up Time:

## 2023-07-27 NOTE — DISCHARGE NOTE PROVIDER - ATTENDING DISCHARGE PHYSICAL EXAMINATION:
Gen - NAD, Comfortable  HEENT - NCAT, EOMI, MMM. right fronto-parietal coma-shaped stapled surgical incision c/d/i  Neck - Supple, No limited ROM  Pulm - good respiratory effort, good chest expansion  Cardiovascular - warm and well perfused  Abdomen - Soft, NT/ND  Extremities - no edema, no calf tenderness  Neuro-     Cognitive - awake, alert, oriented to person, place, date,.  Able  to follow command     Communication - Fluent, Comprehensible     Attention: Intact,     Judgement: Impaired judgement      Motor - No focal deficits.                     LEFT    UE - ShAB 5/5, EF 5/5, EE 5/5,  5/5                    RIGHT UE - ShAB 5/5, EF 5/5, EE 5/5,   5/5                    LEFT    LE - HF 5/5, KE 5/5, DF 5/5, PF 5/5                    RIGHT LE - HF 5/5, KE 5/5, DF 5/5, PF 5/5        Sensory - Intact to LT  Psychiatric - Mood stable, Affect WNL  Skin:  right fronto-parietal coma-shaped stapled surgical incision c/d/i CORNELL, abd incision sites x 2 with steri-strips, resolving periumbilical ecchymosis, no surrounding erythema

## 2023-07-27 NOTE — PROGRESS NOTE ADULT - ASSESSMENT
Assessment/Plan:  Case of a 77 year old right handed male patient with past medical history of CAD/MI S/P PCI x 2 6/2022, dementia, HTN, HLD, Anxiety, CHF w/ EF 25-30 / V Fib Arrest S/P AICD, Renal Ca S/P Lap Rt Radical, BPH s/p TURP 2022, CKD III, CLL, Hypothyroidism, Gout, and Parkinson's misdiagnosis now completely weaned off sinemet, and NPH who is admitted for Acute Inpatient Rehabilitation with a multidisciplinary rehab program at [facility] with functional impairments in ADLs and mobility secondary to normal pressure hydrocephalus surgically treated with VPS placement on 7/19/23.    NPH s/p VPS placement  - Post op CTH 7/19 stable  - memory deficits  - Fall/aspiration precautions  - Start comprehensive rehab program of PT/OT/SLP - 3 hours a day, 5 days a week. P&O as needed     Lymphocytosis   - Likely due to CLL  - Typically with low leukostasis risk    CAD   - Hx of V fib arrest s/p AICD placement  - Toprol 100mg qd    CHF  - EF 25-30% (unknown date)  - Off diuretics  - Caution with aggressive hydration    HTN  - Toprol 100mg qd  - Valsartan 160mg qd  - Monitor BP    HLD  - cont simvastatin 10 mg QHS    Pain  - Tylenol PRN    Sleep  - Melatonin 6mg nightly PRN     GI / Bowel  - Senna qHS     / Bladder  - Currently patient voids independently. (+) incontinence    Skin / Pressure injury  - Skin assessment on admission performed : right cranial VPS incision with staples CORNELL, abd incision sites x 2 with steristrips, periumbilical ecchymosis.  - Turn q2 hours in bed while awake, air mattress  - nursing to monitor skin q Shift  - soft heel protectors  - skin barrier cream PRN    Diet/Dysphagia:  - Diet Consistency: Regular    DVT prophylaxis:   - Lovenox 40mg     Outpatient Follow-up:  Clayton Kirby  Neurosurgery  [address], [address] [place] [address]  Phone: [phone]  Fax: [phone]    Luz Art   Surgery  [address], [address]  [place] [address]  Phone: [phone]  Fax: [phone]  ---------------

## 2023-07-27 NOTE — DISCHARGE NOTE PROVIDER - NSDCHHNEEDSERVICE_GEN_ALL_CORE
11-Feb-2021 22:30
Medication teaching and assessment/Observation and assessment/Rehabilitation services/Teaching and training

## 2023-07-28 ENCOUNTER — TRANSCRIPTION ENCOUNTER (OUTPATIENT)
Age: 78
End: 2023-07-28

## 2023-07-28 RX ORDER — LANOLIN ALCOHOL/MO/W.PET/CERES
9 CREAM (GRAM) TOPICAL AT BEDTIME
Refills: 0 | Status: DISCONTINUED | OUTPATIENT
Start: 2023-07-28 | End: 2023-07-29

## 2023-07-28 RX ORDER — LANOLIN ALCOHOL/MO/W.PET/CERES
3 CREAM (GRAM) TOPICAL
Qty: 0 | Refills: 0 | DISCHARGE
Start: 2023-07-28

## 2023-07-28 RX ADMIN — Medication 9 MILLIGRAM(S): at 21:29

## 2023-07-28 RX ADMIN — Medication 100 MILLIGRAM(S): at 06:00

## 2023-07-28 RX ADMIN — SENNA PLUS 2 TABLET(S): 8.6 TABLET ORAL at 21:29

## 2023-07-28 RX ADMIN — Medication 2000 UNIT(S): at 11:42

## 2023-07-28 RX ADMIN — VALSARTAN 160 MILLIGRAM(S): 80 TABLET ORAL at 06:00

## 2023-07-28 RX ADMIN — Medication 300 MILLIGRAM(S): at 11:42

## 2023-07-28 RX ADMIN — Medication 88 MICROGRAM(S): at 06:00

## 2023-07-28 RX ADMIN — FINASTERIDE 5 MILLIGRAM(S): 5 TABLET, FILM COATED ORAL at 11:42

## 2023-07-28 RX ADMIN — ENOXAPARIN SODIUM 40 MILLIGRAM(S): 100 INJECTION SUBCUTANEOUS at 21:28

## 2023-07-28 RX ADMIN — SIMVASTATIN 10 MILLIGRAM(S): 20 TABLET, FILM COATED ORAL at 21:29

## 2023-07-28 NOTE — DISCHARGE NOTE NURSING/CASE MANAGEMENT/SOCIAL WORK - NSDCFUADDAPPT_GEN_ALL_CORE_FT
Please schedule a f/u visit with your PCP 1-2 weeks of discharge:    MD: Dr. Stefania Berry  Phone: [phone]  Address: [address] [place] [address] [address], [place] [id number]

## 2023-07-28 NOTE — PROGRESS NOTE ADULT - SUBJECTIVE AND OBJECTIVE BOX
HPI:  Case of a 77 year old right handed male patient with past medical history of CAD/MI S/P PCI x 2 6/2022, dementia, HTN, HLD, Anxiety, CHF w/ EF 25-30 / V Fib Arrest S/P AICD, Renal Ca S/P Lap Rt Radical, BPH s/p TURP 2022, CKD III, CLL, Hypothyroidism, Gout, and Parkinson's misdiagnosis now completely weaned off sinemet, and NPH who presented to [facility] on 7/19 for VPS placement. He complained of gait impairment, difficulty with memory, and urinary frequency/incontinence. MRI brain reported dilated ventricles suggestive of normal pressure hydrocephalus. He had and elective high volume LP on 7/14 with improvement in his cognition and gait. Baseline consisted of home ambulation with walker or cane. He underwent VPS placement on 7/19. Remainder of hospital course uncomplicated. Patient evaluated by PT/OT/PM&R and recommended for acute inpatient rehab. Patient is medically stable for discharge to [facility] on 7/21. (21 Jul 2023 15:01)    TDD: 7/29 Home  ___________________________________________________________________________    SUBJECTIVE/ROS  Patient was seen and evaluated at bedside today.  Reported no overnight events and is in no acute distress.  Tolerating rehab and remains cooperative.  Leukocytosis at baseline per CLL diagnosis.  A lengthy discussion took place with patient and his wife.  All questions were answered and they expressed understanding and agreement.   Counseling provided to not drive until cleared by Neurosurgeon as outpatient.  Patient is eager to continue participation on the recommended rehabilitation program.  Denies any CP, SOB, DESIR, palpitations, fever, chills, body aches, cough, congestion, or any other symptoms at this time.   ___________________________________________________________________________    Vital Signs Last 24 Hrs  T(C): 36.6 (27 Jul 2023 07:39), Max: 36.9 (26 Jul 2023 19:57)  T(F): 97.9 (27 Jul 2023 07:39), Max: 98.4 (26 Jul 2023 19:57)  HR: 58 (27 Jul 2023 07:39) (58 - 69)  BP: 143/82 (27 Jul 2023 07:39) (143/82 - 164/98)  RR: 16 (27 Jul 2023 07:39) (16 - 16)  SpO2: 95% (27 Jul 2023 07:39) (95% - 97%)    ___________________________________________________________________________    LAB                        12.7   51.83 )-----------( 170      ( 27 Jul 2023 06:55 )             41.7                  12.5   46.94 )-----------( 163      ( 24 Jul 2023 06:04 )             39.8       07-27    142  |  107  |  32<H>  ----------------------------<  101<H>  4.5   |  27  |  1.54<H>    Ca    9.1      27 Jul 2023 06:55      07-24    140  |  107  |  32<H>  ----------------------------<  107<H>  4.1   |  24  |  1.44<H>    Ca    9.1      24 Jul 2023 06:04  ___________________________________________________________________________    MEDICATIONS  (STANDING):  allopurinol 300 milliGRAM(s) Oral daily  cholecalciferol 2000 Unit(s) Oral daily  enoxaparin Injectable 40 milliGRAM(s) SubCutaneous <User Schedule>  finasteride 5 milliGRAM(s) Oral daily  levothyroxine 88 MICROGram(s) Oral daily  melatonin 6 milliGRAM(s) Oral at bedtime  metoprolol succinate  milliGRAM(s) Oral daily  senna 2 Tablet(s) Oral at bedtime  simvastatin 10 milliGRAM(s) Oral at bedtime  valsartan 160 milliGRAM(s) Oral daily    MEDICATIONS  (PRN):  acetaminophen     Tablet .. 975 milliGRAM(s) Oral every 8 hours PRN Mild Pain (1 - 3), Moderate Pain (4 - 6), Severe Pain (7 - 10)    ___________________________________________________________________________    PHYSICAL EXAM:    Gen - NAD, Comfortable  HEENT - NCAT, EOMI, MMM. right fronto-parietal coma-shaped stapled surgical incision c/d/i  Neck - Supple, No limited ROM  Pulm - good respiratory effort, good chest expansion  Cardiovascular - warm and well perfused  Abdomen - Soft, NT/ND  Extremities - no edema, no calf tenderness  Neuro-     Cognitive - awake, alert, oriented to person, place, date,.  Able  to follow command     Communication - Fluent, Comprehensible     Attention: Intact,     Judgement: Impaired judgement      Motor - No focal deficits.                     LEFT    UE - ShAB 5/5, EF 5/5, EE 5/5,  5/5                    RIGHT UE - ShAB 5/5, EF 5/5, EE 5/5,   5/5                    LEFT    LE - HF 5/5, KE 5/5, DF 5/5, PF 5/5                    RIGHT LE - HF 5/5, KE 5/5, DF 5/5, PF 5/5        Sensory - Intact to LT  Psychiatric - Mood stable, Affect WNL  Skin:  right fronto-parietal coma-shaped stapled surgical incision c/d/i CONRELL, abd incision sites x 2 with steri-strips, resolving periumbilical ecchymosis, no surrounding erythema  ___________________________________________________________________________ HPI:  Case of a 77 year old right handed male patient with past medical history of CAD/MI S/P PCI x 2 6/2022, dementia, HTN, HLD, Anxiety, CHF w/ EF 25-30 / V Fib Arrest S/P AICD, Renal Ca S/P Lap Rt Radical, BPH s/p TURP 2022, CKD III, CLL, Hypothyroidism, Gout, and Parkinson's misdiagnosis now completely weaned off sinemet, and NPH who presented to [facility] on 7/19 for VPS placement. He complained of gait impairment, difficulty with memory, and urinary frequency/incontinence. MRI brain reported dilated ventricles suggestive of normal pressure hydrocephalus. He had and elective high volume LP on 7/14 with improvement in his cognition and gait. Baseline consisted of home ambulation with walker or cane. He underwent VPS placement on 7/19. Remainder of hospital course uncomplicated. Patient evaluated by PT/OT/PM&R and recommended for acute inpatient rehab. Patient is medically stable for discharge to [facility] on 7/21. (21 Jul 2023 15:01)    TDD: 7/29 Home  ___________________________________________________________________________    SUBJECTIVE/ROS  Patient was seen and evaluated at bedside today with wife present.  Patient reports that he did not sleep well last night. Has been taking melatonin 6 mg QHS. Will increase dose tonight.  Patient had staples placed on 7/19, advised that we will remove staples tomorrow morning ahead of discharge.   Leukocytosis at baseline per CLL diagnosis.  A discussion took place with patient and his wife.  All questions were answered and they expressed understanding and agreement.   Counseling provided to not drive until cleared by Neurosurgeon as outpatient.  Patient is looking forward to completing the recommended rehabilitation program and being discharged tomorrow.   Denies any CP, SOB, DESIR, palpitations, fever, chills, body aches, cough, congestion, or any other symptoms at this time.   ___________________________________________________________________________    Vital Signs Last 24 Hrs  T(C): 36.5 (28 Jul 2023 07:31), Max: 36.7 (27 Jul 2023 20:40)  T(F): 97.7 (28 Jul 2023 07:31), Max: 98.1 (27 Jul 2023 20:40)  HR: 66 (28 Jul 2023 07:31) (66 - 67)  BP: 131/87 (28 Jul 2023 07:31) (131/87 - 148/82)  BP(mean): --  RR: 16 (28 Jul 2023 07:31) (16 - 16)  SpO2: 95% (28 Jul 2023 07:31) (95% - 96%)  ___________________________________________________________________________    LAB                        12.7   51.83 )-----------( 170      ( 27 Jul 2023 06:55 )             41.7                  12.5   46.94 )-----------( 163      ( 24 Jul 2023 06:04 )             39.8       07-27    142  |  107  |  32<H>  ----------------------------<  101<H>  4.5   |  27  |  1.54<H>    Ca    9.1      27 Jul 2023 06:55      07-24    140  |  107  |  32<H>  ----------------------------<  107<H>  4.1   |  24  |  1.44<H>    Ca    9.1      24 Jul 2023 06:04  ___________________________________________________________________________    MEDICATIONS  (STANDING):  allopurinol 300 milliGRAM(s) Oral daily  cholecalciferol 2000 Unit(s) Oral daily  enoxaparin Injectable 40 milliGRAM(s) SubCutaneous <User Schedule>  finasteride 5 milliGRAM(s) Oral daily  levothyroxine 88 MICROGram(s) Oral daily  melatonin 6 milliGRAM(s) Oral at bedtime  metoprolol succinate  milliGRAM(s) Oral daily  senna 2 Tablet(s) Oral at bedtime  simvastatin 10 milliGRAM(s) Oral at bedtime  valsartan 160 milliGRAM(s) Oral daily    MEDICATIONS  (PRN):  acetaminophen     Tablet .. 975 milliGRAM(s) Oral every 8 hours PRN Mild Pain (1 - 3), Moderate Pain (4 - 6), Severe Pain (7 - 10)  ___________________________________________________________________________    PHYSICAL EXAM:    Gen - NAD, Comfortable  HEENT - right fronto-parietal coma-shaped stapled surgical incision c/d/i  Pulm - good respiratory effort, good chest expansion  Cardiovascular - warm and well perfused  Abdomen - Soft, NT/ND, steri strip near incision in umbilicus  Extremities - no edema, no calf tenderness  Neuro-     Cognitive - awake, alert, oriented to person, place, date.  Able  to follow command     Communication - Fluent, Comprehensible     Attention: Intact     Judgement: Impaired judgement      Motor - No focal deficits. 5/5 strength x4 extremities   Psychiatric - Mood stable, Affect WNL  Skin:  right fronto-parietal coma-shaped stapled surgical incision c/d/i CORNELL, abd incision sites x 2 with steri-strips, resolving periumbilical ecchymosis, no surrounding erythema  ___________________________________________________________________________

## 2023-07-28 NOTE — DISCHARGE NOTE NURSING/CASE MANAGEMENT/SOCIAL WORK - NSDCPEFALRISK_GEN_ALL_CORE
For information on Fall & Injury Prevention, visit: [email or web address] OR  [email or web address] OR  https://www.cdc.gov/steadi/patient.html

## 2023-07-28 NOTE — PROGRESS NOTE ADULT - ASSESSMENT
Assessment/Plan:  Case of a 77 year old right handed male patient with past medical history of CAD/MI S/P PCI x 2 6/2022, dementia, HTN, HLD, Anxiety, CHF w/ EF 25-30 / V Fib Arrest S/P AICD, Renal Ca S/P Lap Rt Radical, BPH s/p TURP 2022, CKD III, CLL, Hypothyroidism, Gout, and Parkinson's misdiagnosis now completely weaned off sinemet, and NPH who is admitted for Acute Inpatient Rehabilitation with a multidisciplinary rehab program at [facility] with functional impairments in ADLs and mobility secondary to normal pressure hydrocephalus surgically treated with VPS placement on 7/19/23.    NPH s/p VPS placement  - Post op CTH 7/19 stable  - memory deficits  - Fall/aspiration precautions  - Start comprehensive rehab program of PT/OT/SLP - 3 hours a day, 5 days a week. P&O as needed     Lymphocytosis   - Likely due to CLL  - Typically with low leukostasis risk    CAD   - Hx of V fib arrest s/p AICD placement  - Toprol 100mg qd    CHF  - EF 25-30% (unknown date)  - Off diuretics  - Caution with aggressive hydration    HTN  - Toprol 100mg qd  - Valsartan 160mg qd  - Monitor BP    HLD  - cont simvastatin 10 mg QHS    Pain  - Tylenol PRN    Sleep  - Melatonin 6mg nightly PRN     GI / Bowel  - Senna qHS     / Bladder  - Currently patient voids independently. (+) incontinence    Skin / Pressure injury  - Skin assessment on admission performed : right cranial VPS incision with staples CORNELL, abd incision sites x 2 with steristrips, periumbilical ecchymosis.  - Turn q2 hours in bed while awake, air mattress  - nursing to monitor skin q Shift  - soft heel protectors  - skin barrier cream PRN    Diet/Dysphagia:  - Diet Consistency: Regular    DVT prophylaxis:   - Lovenox 40mg     Outpatient Follow-up:  Clayton Kirby  Neurosurgery  [address], [address] [place] [address]  Phone: [phone]  Fax: [phone]    Luz Art   Surgery  [address], [address]  [place] [address]  Phone: [phone]  Fax: [phone]  ---------------   Assessment/Plan:  Case of a 77 year old right handed male patient with past medical history of CAD/MI S/P PCI x 2 6/2022, dementia, HTN, HLD, Anxiety, CHF w/ EF 25-30 / V Fib Arrest S/P AICD, Renal Ca S/P Lap Rt Radical, BPH s/p TURP 2022, CKD III, CLL, Hypothyroidism, Gout, and Parkinson's misdiagnosis now completely weaned off sinemet, and NPH who is admitted for Acute Inpatient Rehabilitation with a multidisciplinary rehab program at [facility] with functional impairments in ADLs and mobility secondary to normal pressure hydrocephalus surgically treated with VPS placement on 7/19/23.    NPH s/p VPS placement  - Post op CTH 7/19 stable  - memory deficits  - Fall/aspiration precautions  - Start comprehensive rehab program of PT/OT/SLP - 3 hours a day, 5 days a week. P&O as needed   - staples placed on 7/19, to be removed on 7/29 ahead of discharge    Lymphocytosis   - Likely due to CLL  - Typically with low leukostasis risk    CAD   - Hx of V fib arrest s/p AICD placement  - Toprol 100mg qd    CHF  - EF 25-30% (unknown date)  - Off diuretics  - Caution with aggressive hydration    HTN  - Toprol 100mg qd  - Valsartan 160mg qd  - Monitor BP    HLD  - cont simvastatin 10 mg QHS    Pain  - Tylenol PRN    Sleep  - Melatonin increased to 9mg nightly on 7/28    GI / Bowel  - Senna qHS     / Bladder  - Currently patient voids independently. (+) incontinence    Skin / Pressure injury  - Skin assessment on admission performed : right cranial VPS incision with staples CORNELL, abd incision sites x 2 with steristrips, periumbilical ecchymosis.  - Turn q2 hours in bed while awake, air mattress  - nursing to monitor skin q Shift  - soft heel protectors  - skin barrier cream PRN    Diet/Dysphagia:  - Diet Consistency: Regular    DVT prophylaxis:   - Lovenox 40mg     Outpatient Follow-up:  Clayton Kirby  Neurosurgery  [address], [address] [place] [address]  Phone: [phone]  Fax: [phone]    Luz Art   Surgery  [address], [address]  [place] [address]  Phone: [phone]  Fax: [phone]  ---------------

## 2023-07-28 NOTE — DISCHARGE NOTE NURSING/CASE MANAGEMENT/SOCIAL WORK - PATIENT PORTAL LINK FT
You can access the FollowMyHealth Patient Portal offered by [facility] by registering at the following website: [email or web address]. By joining [employer]’s FollowMyHealth portal, you will also be able to view your health information using other applications (apps) compatible with our system.

## 2023-07-29 VITALS
HEART RATE: 66 BPM | DIASTOLIC BLOOD PRESSURE: 80 MMHG | SYSTOLIC BLOOD PRESSURE: 127 MMHG | RESPIRATION RATE: 17 BRPM | TEMPERATURE: 98 F | OXYGEN SATURATION: 95 %

## 2023-07-29 PROCEDURE — 99231 SBSQ HOSP IP/OBS SF/LOW 25: CPT

## 2023-07-29 RX ADMIN — FINASTERIDE 5 MILLIGRAM(S): 5 TABLET, FILM COATED ORAL at 11:15

## 2023-07-29 RX ADMIN — Medication 100 MILLIGRAM(S): at 06:36

## 2023-07-29 RX ADMIN — Medication 300 MILLIGRAM(S): at 11:15

## 2023-07-29 RX ADMIN — Medication 2000 UNIT(S): at 11:15

## 2023-07-29 RX ADMIN — VALSARTAN 160 MILLIGRAM(S): 80 TABLET ORAL at 06:35

## 2023-07-29 RX ADMIN — Medication 88 MICROGRAM(S): at 05:14

## 2023-07-29 NOTE — PROGRESS NOTE ADULT - ASSESSMENT
Assessment/Plan:  Case of a 77 year old right handed male patient with past medical history of CAD/MI S/P PCI x 2 6/2022, dementia, HTN, HLD, Anxiety, CHF w/ EF 25-30 / V Fib Arrest S/P AICD, Renal Ca S/P Lap Rt Radical, BPH s/p TURP 2022, CKD III, CLL, Hypothyroidism, Gout, and Parkinson's misdiagnosis now completely weaned off sinemet, and NPH who is admitted for Acute Inpatient Rehabilitation with a multidisciplinary rehab program at [facility] with functional impairments in ADLs and mobility secondary to normal pressure hydrocephalus surgically treated with VPS placement on 7/19/23.    NPH s/p VPS placement  - Post op CTH 7/19 stable  - memory deficits  - Fall/aspiration precautions  - Start comprehensive rehab program of PT/OT/SLP - 3 hours a day, 5 days a week. P&O as needed   - staples placed on 7/19, to be removed on 7/29 ahead of discharge    Lymphocytosis   - Likely due to CLL  - Typically with low leukostasis risk      CAD   - Hx of V fib arrest s/p AICD placement  - Toprol 100mg qd    CHF  - EF 25-30% (unknown date)  - Off diuretics  - Caution with aggressive hydration    HTN  - Toprol 100mg qd  - Valsartan 160mg qd  - Monitor BP    HLD  - cont simvastatin 10 mg QHS    Pain  - Tylenol PRN    Sleep  - Melatonin increased to 9mg nightly on 7/28    GI / Bowel  - Senna qHS     / Bladder  - Currently patient voids independently. (+) incontinence    Skin / Pressure injury  - Skin assessment on admission performed : right cranial VPS incision with staples CORNELL, abd incision sites x 2 with steristrips, periumbilical ecchymosis.  - Turn q2 hours in bed while awake, air mattress  - nursing to monitor skin q Shift  - soft heel protectors  - skin barrier cream PRN    Diet/Dysphagia:  - Diet Consistency: Regular    DVT prophylaxis:   - Lovenox 40mg

## 2023-07-29 NOTE — PROGRESS NOTE ADULT - SUBJECTIVE AND OBJECTIVE BOX
HPI:  Case of a 77 year old right handed male patient with past medical history of CAD/MI S/P PCI x 2 6/2022, dementia, HTN, HLD, Anxiety, CHF w/ EF 25-30 / V Fib Arrest S/P AICD, Renal Ca S/P Lap Rt Radical, BPH s/p TURP 2022, CKD III, CLL, Hypothyroidism, Gout, and Parkinson's misdiagnosis now completely weaned off sinemet, and NPH who presented to [facility] on 7/19 for VPS placement. He complained of gait impairment, difficulty with memory, and urinary frequency/incontinence. MRI brain reported dilated ventricles suggestive of normal pressure hydrocephalus. He had and elective high volume LP on 7/14 with improvement in his cognition and gait. Baseline consisted of home ambulation with walker or cane. He underwent VPS placement on 7/19. Remainder of hospital course uncomplicated. Patient evaluated by PT/OT/PM&R and recommended for acute inpatient rehab. Patient is medically stable for discharge to [facility] on 7/21. (21 Jul 2023 15:01)    TDD: 7/29 Home  ___________________________________________________________________________    SUBJECTIVE/ROS  Slept well last night which he states is a first  He looks forward to discharge home today. Wife will pick him up  Denies any CP, SOB, DESIR, palpitations, fever, chills, body aches, cough, congestion, or any other symptoms at this time.   ___________________________________________________________________________    ICU Vital Signs Last 24 Hrs  T(C): 36.7 (29 Jul 2023 08:05), Max: 36.8 (28 Jul 2023 19:43)  T(F): 98 (29 Jul 2023 08:05), Max: 98.3 (28 Jul 2023 19:43)  HR: 66 (29 Jul 2023 08:05) (64 - 66)  BP: 127/80 (29 Jul 2023 08:05) (122/72 - 127/80)  RR: 17 (29 Jul 2023 08:05) (16 - 17)  SpO2: 95% (29 Jul 2023 08:05) (95% - 96%)  ___________________________________________________________________________    LAB                        12.7   51.83 )-----------( 170      ( 27 Jul 2023 06:55 )             41.7                  12.5   46.94 )-----------( 163      ( 24 Jul 2023 06:04 )             39.8       07-27    142  |  107  |  32<H>  ----------------------------<  101<H>  4.5   |  27  |  1.54<H>    Ca    9.1      27 Jul 2023 06:55      07-24    140  |  107  |  32<H>  ----------------------------<  107<H>  4.1   |  24  |  1.44<H>    Ca    9.1      24 Jul 2023 06:04  ___________________________________________________________________________    MEDICATIONS  (STANDING):  allopurinol 300 milliGRAM(s) Oral daily  cholecalciferol 2000 Unit(s) Oral daily  enoxaparin Injectable 40 milliGRAM(s) SubCutaneous <User Schedule>  finasteride 5 milliGRAM(s) Oral daily  levothyroxine 88 MICROGram(s) Oral daily  melatonin 6 milliGRAM(s) Oral at bedtime  metoprolol succinate  milliGRAM(s) Oral daily  senna 2 Tablet(s) Oral at bedtime  simvastatin 10 milliGRAM(s) Oral at bedtime  valsartan 160 milliGRAM(s) Oral daily    MEDICATIONS  (PRN):  acetaminophen     Tablet .. 975 milliGRAM(s) Oral every 8 hours PRN Mild Pain (1 - 3), Moderate Pain (4 - 6), Severe Pain (7 - 10)  ___________________________________________________________________________    PHYSICAL EXAM:    Gen - NAD, Comfortable  HEENT - right fronto-parietal coma-shaped stapled surgical incision c/d/i  Pulm - good respiratory effort, good chest expansion  Cardiovascular - warm and well perfused  Abdomen - Soft, NT/ND, steri strip near incision in umbilicus  Extremities - no edema, no calf tenderness  Neuro-     Cognitive - awake, alert, oriented to person, place, date.  Able  to follow command     Communication - Fluent, Comprehensible     Attention: Intact     Judgement: Impaired judgement      Motor - No focal deficits. 5/5 strength x4 extremities   Psychiatric - Mood stable, Affect WNL  Skin:  right fronto-parietal coma-shaped stapled surgical incision c/d/i CORNELL, abd incision sites x 2 with steri-strips, resolving periumbilical ecchymosis, no surrounding erythema  ___________________________________________________________________________

## 2023-07-29 NOTE — PROGRESS NOTE ADULT - REASON FOR ADMISSION
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

## 2023-08-03 ENCOUNTER — APPOINTMENT (OUTPATIENT)
Dept: FAMILY MEDICINE | Facility: CLINIC | Age: 78
End: 2023-08-03
Payer: MEDICARE

## 2023-08-03 VITALS
BODY MASS INDEX: 33.07 KG/M2 | TEMPERATURE: 97.3 F | SYSTOLIC BLOOD PRESSURE: 122 MMHG | DIASTOLIC BLOOD PRESSURE: 79 MMHG | HEART RATE: 66 BPM | RESPIRATION RATE: 16 BRPM | OXYGEN SATURATION: 95 % | HEIGHT: 70 IN | WEIGHT: 231 LBS

## 2023-08-03 DIAGNOSIS — Z87.39 PERSONAL HISTORY OF OTHER DISEASES OF THE MUSCULOSKELETAL SYSTEM AND CONNECTIVE TISSUE: ICD-10-CM

## 2023-08-03 DIAGNOSIS — E66.9 OBESITY, UNSPECIFIED: ICD-10-CM

## 2023-08-03 DIAGNOSIS — R26.9 UNSPECIFIED ABNORMALITIES OF GAIT AND MOBILITY: ICD-10-CM

## 2023-08-03 LAB
T4 FREE SERPL-MCNC: 1.3 NG/DL
TSH SERPL-ACNC: 2.38 UIU/ML

## 2023-08-03 PROCEDURE — 99215 OFFICE O/P EST HI 40 MIN: CPT

## 2023-08-03 NOTE — PHYSICAL EXAM
[Normal] : affect was normal and insight and judgment were intact [de-identified] : using cane , gait improved

## 2023-08-03 NOTE — HISTORY OF PRESENT ILLNESS
[de-identified] : 76 y/o M s/p Laparoscopic   shunt insertion 2 weeks ago for NPH.Pt doing well. Gait more stable. Using cane. Reviewed meds. Lost 10 pounds. Rehab for 2 weeks. According to wife gait and cognition better. Pt states he has less incontinence

## 2023-08-06 ENCOUNTER — RX RENEWAL (OUTPATIENT)
Age: 78
End: 2023-08-06

## 2023-08-10 ENCOUNTER — OUTPATIENT (OUTPATIENT)
Dept: OUTPATIENT SERVICES | Facility: HOSPITAL | Age: 78
LOS: 1 days | End: 2023-08-10
Payer: MEDICARE

## 2023-08-10 ENCOUNTER — EMERGENCY (EMERGENCY)
Facility: HOSPITAL | Age: 78
LOS: 1 days | Discharge: HOME | End: 2023-08-10
Attending: STUDENT IN AN ORGANIZED HEALTH CARE EDUCATION/TRAINING PROGRAM | Admitting: STUDENT IN AN ORGANIZED HEALTH CARE EDUCATION/TRAINING PROGRAM
Payer: MEDICARE

## 2023-08-10 ENCOUNTER — APPOINTMENT (OUTPATIENT)
Dept: CT IMAGING | Facility: HOSPITAL | Age: 78
End: 2023-08-10
Payer: MEDICARE

## 2023-08-10 VITALS
SYSTOLIC BLOOD PRESSURE: 155 MMHG | DIASTOLIC BLOOD PRESSURE: 70 MMHG | RESPIRATION RATE: 18 BRPM | OXYGEN SATURATION: 95 % | TEMPERATURE: 98 F | HEART RATE: 56 BPM

## 2023-08-10 VITALS
SYSTOLIC BLOOD PRESSURE: 164 MMHG | HEIGHT: 70 IN | OXYGEN SATURATION: 96 % | DIASTOLIC BLOOD PRESSURE: 80 MMHG | WEIGHT: 229.94 LBS | RESPIRATION RATE: 18 BRPM | HEART RATE: 66 BPM | TEMPERATURE: 98 F

## 2023-08-10 DIAGNOSIS — I13.0 HYPERTENSIVE HEART AND CHRONIC KIDNEY DISEASE WITH HEART FAILURE AND STAGE 1 THROUGH STAGE 4 CHRONIC KIDNEY DISEASE, OR UNSPECIFIED CHRONIC KIDNEY DISEASE: ICD-10-CM

## 2023-08-10 DIAGNOSIS — R51.9 HEADACHE, UNSPECIFIED: ICD-10-CM

## 2023-08-10 DIAGNOSIS — N18.30 CHRONIC KIDNEY DISEASE, STAGE 3 UNSPECIFIED: ICD-10-CM

## 2023-08-10 DIAGNOSIS — Z98.52 VASECTOMY STATUS: ICD-10-CM

## 2023-08-10 DIAGNOSIS — Z95.810 PRESENCE OF AUTOMATIC (IMPLANTABLE) CARDIAC DEFIBRILLATOR: ICD-10-CM

## 2023-08-10 DIAGNOSIS — Z98.2 PRESENCE OF CEREBROSPINAL FLUID DRAINAGE DEVICE: ICD-10-CM

## 2023-08-10 DIAGNOSIS — Z98.890 OTHER SPECIFIED POSTPROCEDURAL STATES: Chronic | ICD-10-CM

## 2023-08-10 DIAGNOSIS — Z85.528 PERSONAL HISTORY OF OTHER MALIGNANT NEOPLASM OF KIDNEY: ICD-10-CM

## 2023-08-10 DIAGNOSIS — Z86.59 PERSONAL HISTORY OF OTHER MENTAL AND BEHAVIORAL DISORDERS: ICD-10-CM

## 2023-08-10 DIAGNOSIS — Z90.5 ACQUIRED ABSENCE OF KIDNEY: ICD-10-CM

## 2023-08-10 DIAGNOSIS — Z87.19 PERSONAL HISTORY OF OTHER DISEASES OF THE DIGESTIVE SYSTEM: ICD-10-CM

## 2023-08-10 DIAGNOSIS — E78.5 HYPERLIPIDEMIA, UNSPECIFIED: ICD-10-CM
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
## 2023-08-10 DIAGNOSIS — F03.90 UNSPECIFIED DEMENTIA, UNSPECIFIED SEVERITY, WITHOUT BEHAVIORAL DISTURBANCE, PSYCHOTIC DISTURBANCE, MOOD DISTURBANCE, AND ANXIETY: ICD-10-CM

## 2023-08-10 DIAGNOSIS — Z86.74 PERSONAL HISTORY OF SUDDEN CARDIAC ARREST: ICD-10-CM

## 2023-08-10 DIAGNOSIS — Z86.16 PERSONAL HISTORY OF COVID-19: ICD-10-CM

## 2023-08-10 DIAGNOSIS — Z90.79 ACQUIRED ABSENCE OF OTHER GENITAL ORGAN(S): ICD-10-CM

## 2023-08-10 DIAGNOSIS — I25.10 ATHEROSCLEROTIC HEART DISEASE OF NATIVE CORONARY ARTERY WITHOUT ANGINA PECTORIS: ICD-10-CM

## 2023-08-10 DIAGNOSIS — E89.0 POSTPROCEDURAL HYPOTHYROIDISM: Chronic | ICD-10-CM

## 2023-08-10 DIAGNOSIS — Z87.438 PERSONAL HISTORY OF OTHER DISEASES OF MALE GENITAL ORGANS: ICD-10-CM

## 2023-08-10 DIAGNOSIS — I50.9 HEART FAILURE, UNSPECIFIED: ICD-10-CM

## 2023-08-10 DIAGNOSIS — S06.5XAA TRAUMATIC SUBDURAL HEMORRHAGE WITH LOSS OF CONSCIOUSNESS STATUS UNKNOWN, INITIAL ENCOUNTER: ICD-10-CM

## 2023-08-10 DIAGNOSIS — G91.9 HYDROCEPHALUS, UNSPECIFIED: ICD-10-CM

## 2023-08-10 DIAGNOSIS — M10.9 GOUT, UNSPECIFIED: ICD-10-CM

## 2023-08-10 DIAGNOSIS — E89.0 POSTPROCEDURAL HYPOTHYROIDISM: ICD-10-CM

## 2023-08-10 DIAGNOSIS — I25.2 OLD MYOCARDIAL INFARCTION: ICD-10-CM

## 2023-08-10 DIAGNOSIS — Z98.52 VASECTOMY STATUS: Chronic | ICD-10-CM

## 2023-08-10 DIAGNOSIS — Z86.69 PERSONAL HISTORY OF OTHER DISEASES OF THE NERVOUS SYSTEM AND SENSE ORGANS: ICD-10-CM
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
## 2023-08-10 DIAGNOSIS — Z85.828 PERSONAL HISTORY OF OTHER MALIGNANT NEOPLASM OF SKIN: ICD-10-CM

## 2023-08-10 DIAGNOSIS — Z85.6 PERSONAL HISTORY OF LEUKEMIA: ICD-10-CM

## 2023-08-10 PROBLEM — Z51.89 VISIT FOR WOUND CHECK: Status: ACTIVE | Noted: 2023-08-10

## 2023-08-10 PROBLEM — Z09 POSTOP CHECK: Status: ACTIVE | Noted: 2023-08-10

## 2023-08-10 LAB
ANION GAP SERPL CALC-SCNC: 11 MMOL/L — SIGNIFICANT CHANGE UP (ref 5–17)
APTT BLD: 31.9 SEC — SIGNIFICANT CHANGE UP (ref 24.5–35.6)
BUN SERPL-MCNC: 27 MG/DL — HIGH (ref 7–23)
CALCIUM SERPL-MCNC: 9.6 MG/DL — SIGNIFICANT CHANGE UP (ref 8.4–10.5)
CHLORIDE SERPL-SCNC: 105 MMOL/L — SIGNIFICANT CHANGE UP (ref 96–108)
CO2 SERPL-SCNC: 24 MMOL/L — SIGNIFICANT CHANGE UP (ref 22–31)
CREAT SERPL-MCNC: 1.71 MG/DL — HIGH (ref 0.5–1.3)
EGFR: 41 ML/MIN/1.73M2 — LOW
GLUCOSE SERPL-MCNC: 119 MG/DL — HIGH (ref 70–99)
HCT VFR BLD CALC: 39.6 % — SIGNIFICANT CHANGE UP (ref 39–50)
HGB BLD-MCNC: 12.6 G/DL — LOW (ref 13–17)
INR BLD: 0.96 — SIGNIFICANT CHANGE UP (ref 0.85–1.18)
MCHC RBC-ENTMCNC: 31.8 GM/DL — LOW (ref 32–36)
MCHC RBC-ENTMCNC: 33.1 PG — SIGNIFICANT CHANGE UP (ref 27–34)
MCV RBC AUTO: 103.9 FL — HIGH (ref 80–100)
NRBC # BLD: 0 /100 WBCS — SIGNIFICANT CHANGE UP (ref 0–0)
PLATELET # BLD AUTO: 156 K/UL — SIGNIFICANT CHANGE UP (ref 150–400)
POTASSIUM SERPL-MCNC: 5.2 MMOL/L — SIGNIFICANT CHANGE UP (ref 3.5–5.3)
POTASSIUM SERPL-SCNC: 5.2 MMOL/L — SIGNIFICANT CHANGE UP (ref 3.5–5.3)
PROTHROM AB SERPL-ACNC: 11 SEC — SIGNIFICANT CHANGE UP (ref 9.5–13)
RBC # BLD: 3.81 M/UL — LOW (ref 4.2–5.8)
RBC # FLD: 13.9 % — SIGNIFICANT CHANGE UP (ref 10.3–14.5)
SODIUM SERPL-SCNC: 140 MMOL/L — SIGNIFICANT CHANGE UP (ref 135–145)
WBC # BLD: 40.09 K/UL — CRITICAL HIGH (ref 3.8–10.5)
WBC # FLD AUTO: 40.09 K/UL — CRITICAL HIGH (ref 3.8–10.5)

## 2023-08-10 PROCEDURE — 85610 PROTHROMBIN TIME: CPT

## 2023-08-10 PROCEDURE — 85027 COMPLETE CBC AUTOMATED: CPT

## 2023-08-10 PROCEDURE — 99284 EMERGENCY DEPT VISIT MOD MDM: CPT

## 2023-08-10 PROCEDURE — 99283 EMERGENCY DEPT VISIT LOW MDM: CPT

## 2023-08-10 PROCEDURE — 70450 CT HEAD/BRAIN W/O DYE: CPT

## 2023-08-10 PROCEDURE — 36415 COLL VENOUS BLD VENIPUNCTURE: CPT

## 2023-08-10 PROCEDURE — 80048 BASIC METABOLIC PNL TOTAL CA: CPT

## 2023-08-10 PROCEDURE — 70450 CT HEAD/BRAIN W/O DYE: CPT | Mod: 26,MH

## 2023-08-10 PROCEDURE — 99285 EMERGENCY DEPT VISIT HI MDM: CPT

## 2023-08-10 PROCEDURE — 85730 THROMBOPLASTIN TIME PARTIAL: CPT

## 2023-08-10 RX ORDER — ACETAMINOPHEN 500 MG
650 TABLET ORAL ONCE
Refills: 0 | Status: COMPLETED | OUTPATIENT
Start: 2023-08-10 | End: 2023-08-10

## 2023-08-10 RX ADMIN — Medication 650 MILLIGRAM(S): at 22:29

## 2023-08-10 NOTE — CONSULT NOTE ADULT - SUBJECTIVE AND OBJECTIVE BOX
HISTORY OF PRESENT ILLNESS:   76 y/o male PMHx BPH s/p TURP 2022, CKD III, CLL, HTN, Hypothyroidism, Gout, Parkinson's misdiagnosis now completely weaned off sinemet, s/p cataract surgery bilateral and hemithyroidectomy, NPH s/p R frontal  shunt (Certas @ 5) with Dr. Kirby 7/19/23, presents to ER today for evaluation of right subdural hematoma found on outpatient CT. Pt reports recent onset of mild headaches at night, respond well to Tylenol. Notified outpatient NSGY office, CTH ordered. He stopped taking ASA 81mg per his primary care provider >1week ago. Denies nausea, vomiting, weakness, numbness, vision changes, hx trauma or falls.      PAST MEDICAL & SURGICAL HISTORY:  HTN (hypertension)      Gout  no attack > 5 years      HLD (hyperlipidemia)      Basal cell carcinoma  head      Enlarged prostate with lower urinary tract symptoms (LUTS)      History of 2019 novel coronavirus disease (COVID-19)  April 2020, fatigue, fever and confusion. no hospitalization      GERD (gastroesophageal reflux disease)      CLL (chronic lymphocytic leukemia)      Glaucoma  no eye drops      NPH (normal pressure hydrocephalus)      Hypothyroidism      History of basal cell carcinoma excision  2021      History of partial thyroidectomy  2012, benign nodule      History of vasectomy  age 47      History of prostate surgery  aquablation        FAMILY HISTORY:  Family history of heart attack (Father)      Allergies    No Known Allergies    Intolerances        MEDICATIONS:  Antibiotics:    Neuro:    Anticoagulation:    OTHER:    IVF:      Vital Signs Last 24 Hrs  T(C): 36.8 (10 Aug 2023 19:53), Max: 36.8 (10 Aug 2023 19:53)  T(F): 98.3 (10 Aug 2023 19:53), Max: 98.3 (10 Aug 2023 19:53)  HR: 66 (10 Aug 2023 19:53) (66 - 66)  BP: 164/80 (10 Aug 2023 19:53) (164/80 - 164/80)  BP(mean): --  RR: 18 (10 Aug 2023 19:53) (18 - 18)  SpO2: 96% (10 Aug 2023 19:53) (96% - 96%)    Parameters below as of 10 Aug 2023 19:53  Patient On (Oxygen Delivery Method): room air        PHYSICAL EXAM:  General: NAD, pt is comfortably sitting up in bed, on room air  HEENT: CN II-XII intact, PERRL 3mm briskly reactive, EOMI b/l, face symmetric, tongue midline, neck FROM  Cardiovascular: RRR, normal S1 and S2   Respiratory: lungs CTAB, no wheezing, rhonchi, or crackles   GI: normoactive BS to auscultation, abd soft, NTND   Neuro: A&Ox3, No aphasia, speech clear, no dysmetria, no pronator drift. Follows commands.  CORDOVA x4 spontaneously, 5/5 strength in all extremities throughout. Sensation intact throughout   Extremities: distal pulses 2+ x4  Wound/incision: R frontal VPS cranial incisions well healed          LABS:              CULTURES:      RADIOLOGY & ADDITIONAL STUDIES:  < from: CT Head No Cont (08.10.23 @ 14:43) >  IMPRESSION:  1.  Redemonstration of right frontal approach ventriculostomy catheter   with tip in the right frontal horn. New hypodensity along the shunt   catheter tract suggesting vasogenic edema. Ventricular size is slightly   decreased.  2.  New mixed density right frontal parietal subdural hematoma measuring   1.2 cm in greatest depth. This is predominantly hypodense with small   areas of hyperdensity. Right-to-left midline shift measuring 3 mm.    Attempts were made to reach the referring provider by phone. A [employer]   Teams message containing the finding of new subdural hematoma was sent to   DAKOTA MCNEAL at 8/10/2023 4:51 PM by Dr. Gold Bower.    < end of copied text >        Assessment:   76 y/o male PMHx BPH s/p TURP 2022, CKD III, CLL, HTN, Hypothyroidism, Gout, Parkinson's misdiagnosis now completely weaned off sinemet, s/p cataract surgery bilateral and hemithyroidectomy, NPH s/p R frontal  shunt (Certas @ 5) with Dr. Kirby 7/19/23, presents to ER today for evaluation of right subdural hematoma found on outpatient CT. Neurologically intact.     Plan:  -  shunt reprogrammed, currently at 8 (off)  - f/u with Dr. Kirby in 10-14 days, repeat CTH with follow up appointment  - continue holding aspirin    Discussed with patient, wife, and ER team  Discussed with Dr. Kirby   HISTORY OF PRESENT ILLNESS:   78 y/o male PMHx BPH s/p TURP 2022, CKD III, CLL, HTN, Hypothyroidism, Gout, Parkinson's misdiagnosis now completely weaned off sinemet, s/p cataract surgery bilateral and hemithyroidectomy, NPH s/p R frontal  shunt (Certas @ 5) with Dr. Kirby 7/19/23, presents to ER today for evaluation of right subdural hematoma found on outpatient CT. Pt reports recent onset of mild headaches at night, respond well to Tylenol. Notified outpatient NSGY office, CTH ordered. He stopped taking ASA 81mg per his primary care provider >1week ago. Denies nausea, vomiting, weakness, numbness, vision changes, hx trauma or falls.      PAST MEDICAL & SURGICAL HISTORY:  HTN (hypertension)      Gout  no attack > 5 years      HLD (hyperlipidemia)      Basal cell carcinoma  head      Enlarged prostate with lower urinary tract symptoms (LUTS)      History of 2019 novel coronavirus disease (COVID-19)  April 2020, fatigue, fever and confusion. no hospitalization      GERD (gastroesophageal reflux disease)      CLL (chronic lymphocytic leukemia)      Glaucoma  no eye drops      NPH (normal pressure hydrocephalus)      Hypothyroidism      History of basal cell carcinoma excision  2021      History of partial thyroidectomy  2012, benign nodule      History of vasectomy  age 47      History of prostate surgery  aquablation        FAMILY HISTORY:  Family history of heart attack (Father)      Allergies    No Known Allergies    Intolerances        MEDICATIONS:  Antibiotics:    Neuro:    Anticoagulation:    OTHER:    IVF:      Vital Signs Last 24 Hrs  T(C): 36.8 (10 Aug 2023 19:53), Max: 36.8 (10 Aug 2023 19:53)  T(F): 98.3 (10 Aug 2023 19:53), Max: 98.3 (10 Aug 2023 19:53)  HR: 66 (10 Aug 2023 19:53) (66 - 66)  BP: 164/80 (10 Aug 2023 19:53) (164/80 - 164/80)  BP(mean): --  RR: 18 (10 Aug 2023 19:53) (18 - 18)  SpO2: 96% (10 Aug 2023 19:53) (96% - 96%)    Parameters below as of 10 Aug 2023 19:53  Patient On (Oxygen Delivery Method): room air        PHYSICAL EXAM:  General: NAD, pt is comfortably sitting up in bed, on room air  HEENT: CN II-XII intact, PERRL 3mm briskly reactive, EOMI b/l, face symmetric, tongue midline, neck FROM  Cardiovascular: RRR, normal S1 and S2   Respiratory: lungs CTAB, no wheezing, rhonchi, or crackles   GI: normoactive BS to auscultation, abd soft, NTND   Neuro: A&Ox3, No aphasia, speech clear, no dysmetria, no pronator drift. Follows commands.  CORDOVA x4 spontaneously, 5/5 strength in all extremities throughout. Sensation intact throughout   Extremities: distal pulses 2+ x4  Wound/incision: R frontal VPS cranial incisions well healed          LABS:  LABS:                        12.6   x     )-----------( 156      ( 10 Aug 2023 22:29 )             39.6     08-10    140  |  105  |  27<H>  ----------------------------<  119<H>  5.2   |  24  |  1.71<H>    Ca    9.6      10 Aug 2023 21:03      PT/INR - ( 10 Aug 2023 21:03 )   PT: 11.0 sec;   INR: 0.96          PTT - ( 10 Aug 2023 21:03 )  PTT:31.9 sec  Urinalysis Basic - ( 10 Aug 2023 21:03 )    Color: x / Appearance: x / SG: x / pH: x  Gluc: 119 mg/dL / Ketone: x  / Bili: x / Urobili: x   Blood: x / Protein: x / Nitrite: x   Leuk Esterase: x / RBC: x / WBC x   Sq Epi: x / Non Sq Epi: x / Bacteria: x      CAPILLARY BLOOD GLUCOSE                CULTURES:      RADIOLOGY & ADDITIONAL STUDIES:  < from: CT Head No Cont (08.10.23 @ 14:43) >  IMPRESSION:  1.  Redemonstration of right frontal approach ventriculostomy catheter   with tip in the right frontal horn. New hypodensity along the shunt   catheter tract suggesting vasogenic edema. Ventricular size is slightly   decreased.  2.  New mixed density right frontal parietal subdural hematoma measuring   1.2 cm in greatest depth. This is predominantly hypodense with small   areas of hyperdensity. Right-to-left midline shift measuring 3 mm.    Attempts were made to reach the referring provider by phone. A [employer]   Teams message containing the finding of new subdural hematoma was sent to   NP JEFF MCNEAL at 8/10/2023 4:51 PM by Dr. Gold Bower.    < end of copied text >        Assessment:   78 y/o male PMHx BPH s/p TURP 2022, CKD III, CLL, HTN, Hypothyroidism, Gout, Parkinson's misdiagnosis now completely weaned off sinemet, s/p cataract surgery bilateral and hemithyroidectomy, NPH s/p R frontal  shunt (Certas @ 5) with Dr. Kirby 7/19/23, presents to ER today for evaluation of right subdural hematoma found on outpatient CT. Neurologically intact.     Plan:  -  shunt reprogrammed, currently at 8 (off)  - f/u with Dr. Kirby in 10-14 days, repeat CTH with follow up appointment  - continue holding aspirin    Discussed with patient, wife, and ER team  Discussed with Dr. Kirby

## 2023-08-10 NOTE — ED PROVIDER NOTE - NSFOLLOWUPINSTRUCTIONS_ED_ALL_ED_FT
Follow up with your primary medical doctor as soon as possible.  Follow up with Dr. Kirby within 1 week - Call [phone] to schedule an appointment.    Return to the emergency department if your symptoms worsen or if you develop new symptoms.  If you have any problems with followup, please call the ED Referral Coordinator at [phone].    Headache    A headache is pain or discomfort felt around the head or neck area. The specific cause of a headache may not be found as there are many types including tension headaches, migraine headaches, and cluster headaches. Watch your condition for any changes. Things you can do to manage your pain include taking over the counter and prescription medications as instructed by your health care provider, lying down in a dark quiet room, limiting stress, getting regular sleep, and refraining from alcohol and tobacco products.    SEEK IMMEDIATE MEDICAL CARE IF YOU HAVE ANY OF THE FOLLOWING SYMPTOMS: fever, vomiting, stiff neck, loss of vision, problems with speech, muscle weakness, loss of balance, trouble walking, passing out, or confusion.

## 2023-08-10 NOTE — ED PROVIDER NOTE - NS ED ROS FT
CONSTITUTIONAL: No fever, no chills, no fatigue  EYES: No eye redness, no visual changes  ENT: No ear pain, no sore throat  CARDIOVASCULAR: No chest pain, no palpitations  RESPIRATORY: No cough, no SOB  GI: No abdominal pain, no nausea, no vomiting, no constipation, no diarrhea  GENITOURINARY: No dysuria, no frequency, no hematuria  MUSCULOSKELETAL: No back pain, no joint pain, no myalgias  SKIN: No rash, no peripheral edema  NEURO: +headache, no confusion    ALL OTHER SYSTEMS NEGATIVE.

## 2023-08-10 NOTE — ED PROVIDER NOTE - CLINICAL SUMMARY MEDICAL DECISION MAKING FREE TEXT BOX
Spontaneous SDH, s/p VPS placement on 7/19. Mild headache, no neuro deficits.  Plan to consult Nsx for shunt adjustment.  No s/s of herniation or ischemic CVA.

## 2023-08-10 NOTE — ED ADULT NURSE NOTE - NSFALLUNIVINTERV_ED_ALL_ED
Bed/Stretcher in lowest position, wheels locked, appropriate side rails in place/Call bell, personal items and telephone in reach/Instruct patient to call for assistance before getting out of bed/chair/stretcher/Non-slip footwear applied when patient is off stretcher/[place] to call system/Physically safe environment - no spills, clutter or unnecessary equipment/Purposeful proactive rounding/Room/bathroom lighting operational, light cord in reach

## 2023-08-10 NOTE — ED PROVIDER NOTE - CARE PROVIDER_API CALL
Clayton Kirby  Neurosurgery  [address] [place] [address]  Phone: [phone]  Fax: [phone]  Follow Up Time: 4-6 Days

## 2023-08-10 NOTE — ED PROVIDER NOTE - OBJECTIVE STATEMENT
76 yo M w PMH of CAD/MI S/P PCI x 2 6/2022, dementia, HTN, HLD, Anxiety, CHF w/ EF 25-30 / V Fib Arrest S/P AICD, Renal Ca S/P Lap Rt Radical, BPH s/p TURP 2022, CKD III, CLL, Hypothyroidism, Gout, and Parkinson's misdiagnosis now completely weaned off sinemet, s/p recent VPS placement on 7/19, presents for eval of right subdural hematoma found on outpatient CT. Pt has had mild headaches, responsive to Tylenol. Patient received CTh outpatient after corresponding with neurosurgery office. He stopped taking his ASA per PMD. No N/B, weakness, numbness, vision change, difficulty speaking, trauma.

## 2023-08-10 NOTE — ED ADULT NURSE NOTE - OBJECTIVE STATEMENT
Pt 77y M pmhx hydrocephalus shunt placed 7/19, c/o 2x days HA. Pt had outpatient CT that showed subdural hematoma. Pt denies any dizziness, changes in vision, no focal weakness on exam. Denies SOB, CP, n/v/d, urinary symptoms, fever or chills. Pt ambulating with cane.

## 2023-08-10 NOTE — ED PROVIDER NOTE - PATIENT PORTAL LINK FT
You can access the FollowMyHealth Patient Portal offered by [facility] by registering at the following website: [email or web address]. By joining [facility]’s FollowMyHealth portal, you will also be able to view your health information using other applications (apps) compatible with our system.

## 2023-08-10 NOTE — ED ADULT TRIAGE NOTE - CHIEF COMPLAINT QUOTE
pt walked in aaox3 had shunt placed 7/19, c/o 2x days HA. Pt had outpatient CT that showed subdural hematoma. Pt denies any dizziness, changes in vision, no focal weakness on exam. Pt ambulating with cane.

## 2023-08-10 NOTE — ED ADULT NURSE NOTE - NSFALLRISK_ED_ALL_ED
Patient's awake and alert. On 3 L n/c. NSR on the monitor. VSS. Pain tolerable. Tolerating water, denies nausea. drsg to left breast CDI. Patient meets criteria to be discharged from phase 1. Will prepare for discharge home in phase 2. No

## 2023-08-10 NOTE — ED PROVIDER NOTE - PROGRESS NOTE DETAILS
Patient evaluated by neurosurgery, who state that patient is able to go home and follow-up with Dr. Kirby as outpatient.  WBCs noted, patient has history of CLL. Appear to be at baseline from prior.

## 2023-08-11 ENCOUNTER — APPOINTMENT (OUTPATIENT)
Dept: NEPHROLOGY | Facility: CLINIC | Age: 78
End: 2023-08-11
Payer: MEDICARE

## 2023-08-11 VITALS
OXYGEN SATURATION: 95 % | HEIGHT: 70 IN | SYSTOLIC BLOOD PRESSURE: 161 MMHG | HEART RATE: 64 BPM | WEIGHT: 230 LBS | BODY MASS INDEX: 32.93 KG/M2 | DIASTOLIC BLOOD PRESSURE: 89 MMHG | TEMPERATURE: 97.8 F

## 2023-08-11 VITALS — SYSTOLIC BLOOD PRESSURE: 120 MMHG | DIASTOLIC BLOOD PRESSURE: 70 MMHG

## 2023-08-11 PROCEDURE — 99214 OFFICE O/P EST MOD 30 MIN: CPT

## 2023-08-11 RX ORDER — CARBIDOPA AND LEVODOPA 25; 100 MG/1; MG/1
25-100 TABLET ORAL
Refills: 0 | Status: DISCONTINUED | COMMUNITY
End: 2023-08-11

## 2023-08-11 RX ORDER — IRBESARTAN 150 MG/1
150 TABLET ORAL
Qty: 90 | Refills: 1 | Status: DISCONTINUED | COMMUNITY
Start: 2018-07-16 | End: 2023-08-11

## 2023-08-11 RX ORDER — ASPIRIN 81 MG
TABLET,CHEWABLE ORAL
Refills: 0 | Status: DISCONTINUED | COMMUNITY
End: 2023-08-11

## 2023-08-12 NOTE — HISTORY OF PRESENT ILLNESS
[FreeTextEntry1] : CHA GONZALEZ is a 77-year-old male with a history HTN and CKD 3b here for follow up with his wife.  Had a recent second opinion by Dr. Temple and told he did not have Parkinsons, so Sinemet was stopped. MRI revealed hydrocephalus. Stooped gait improved and incontinence resolved s/p  shunt in July at [facility]. Last night pt went to [facility] ED secondary to headaches and shunt settings were changed. Has f/u with surgeon next week. Labs were done in ED.  He follows with Dr. Mu Sparks for CLL.  Pt is having PT at home and seeing continued improvement. PT suggested increasing protein intake and pts wife is concerned as she remembers that she was advised to limit protein in CKD.

## 2023-08-12 NOTE — ASSESSMENT
[FreeTextEntry1] : 76 yo male with sub-nephrotic proteinuria, HTN, CLL, CKD3b s/p  shunt. Dr. Gomez present for visit.  CKD 3b: Likely due to hyperfiltration due to obesity.  Creatinine trend reviewed with patient, renal function stable.  HTN: BP at goal on repeat.  Proteinuria:  send u/a, microalbumin/creatinine ratio. Continue Valsartan for antiproteinuric effect. Monitor and trend proteinuria.  Modest protein intake discussed and no more than 75 grams of daily protein intake suggested.  Following urology for BPH Hydrocephalus s/p  shunt: Follow with neuro and physical therapy The patient was educated on the importance of good blood pressure and to avoid NSAIDs.  Lifestyle modifications encouraged including low Na diet.  Weight loss advised. All questions were answered. Follow-up in 6 months.

## 2023-08-12 NOTE — PHYSICAL EXAM
[General Appearance - Alert] : alert [General Appearance - In No Acute Distress] : in no acute distress [Sclera] : the sclera and conjunctiva were normal [Outer Ear] : the ears and nose were normal in appearance [Neck Appearance] : the appearance of the neck was normal [Jugular Venous Distention Increased] : there was no jugular-venous distention [Neck Cervical Mass (___cm)] : no neck mass was observed [Auscultation Breath Sounds / Voice Sounds] : lungs were clear to auscultation bilaterally [Heart Rate And Rhythm] : heart rate was normal and rhythm regular [Heart Sounds] : normal S1 and S2 [Murmurs] : no murmurs [Heart Sounds Gallop] : no gallops [Heart Sounds Pericardial Friction Rub] : no pericardial rub [Edema] : there was no peripheral edema [Bowel Sounds] : normal bowel sounds [Abdomen Soft] : soft [No CVA Tenderness] : no ~M costovertebral angle tenderness [Involuntary Movements] : no involuntary movements were seen [FreeTextEntry1] : uses a cane [] : no rash [No Focal Deficits] : no focal deficits [Oriented To Time, Place, And Person] : oriented to person, place, and time [Impaired Insight] : insight and judgment were intact [Affect] : the affect was normal

## 2023-08-12 NOTE — REVIEW OF SYSTEMS
[Recent Weight Loss (___ Lbs)] : recent [unfilled] ~Ulb weight loss [As Noted in HPI] : as noted in HPI [Confused] : no confusion [Difficulty Walking] : difficulty walking [Negative] : Heme/Lymph

## 2023-08-14 LAB
APPEARANCE: CLEAR
BACTERIA: NEGATIVE /HPF
BILIRUBIN URINE: NEGATIVE
BLOOD URINE: NEGATIVE
CAST: 0 /LPF
COLOR: YELLOW
CREAT SPEC-SCNC: 139 MG/DL
EPITHELIAL CELLS: 4 /HPF
GLUCOSE QUALITATIVE U: NEGATIVE MG/DL
KETONES URINE: ABNORMAL MG/DL
LEUKOCYTE ESTERASE URINE: NEGATIVE
MICROALBUMIN 24H UR DL<=1MG/L-MCNC: 34.6 MG/DL
MICROALBUMIN/CREAT 24H UR-RTO: 250 MG/G
MICROSCOPIC-UA: NORMAL
NITRITE URINE: NEGATIVE
PH URINE: 6
PROTEIN URINE: 100 MG/DL
RED BLOOD CELLS URINE: 1 /HPF
SPECIFIC GRAVITY URINE: 1.02
UROBILINOGEN URINE: 0.2 MG/DL
WHITE BLOOD CELLS URINE: 1 /HPF

## 2023-08-15 ENCOUNTER — APPOINTMENT (OUTPATIENT)
Dept: NEUROSURGERY | Facility: CLINIC | Age: 78
End: 2023-08-15
Payer: MEDICARE

## 2023-08-15 DIAGNOSIS — Z51.89 ENCOUNTER FOR OTHER SPECIFIED AFTERCARE: ICD-10-CM

## 2023-08-15 DIAGNOSIS — Z09 ENCOUNTER FOR FOLLOW-UP EXAMINATION AFTER COMPLETED TREATMENT FOR CONDITIONS OTHER THAN MALIGNANT NEOPLASM: ICD-10-CM

## 2023-08-15 PROCEDURE — 99024 POSTOP FOLLOW-UP VISIT: CPT

## 2023-08-15 NOTE — DATA REVIEWED
[de-identified] :   ACC: [id number] EXAM: CT BRAIN ORDERED BY: JEFF MCNEAL  PROCEDURE DATE: 08/10/2023    INTERPRETATION: CLINICAL INFORMATION: hydocephalus unspecified type. TCT. Admitting Dxs: G91.9 HYDROCEPHALUS, UNSPECIFIED. 77 male with history of normal pressure hydrocephalus presents for follow-up CT scan status post shunt placement.  TECHNIQUE: Sequential axial images were obtained from the vertex to the skull base without intravenous contrast. Coronal and sagittal reformations were obtained.  COMPARISON: Prior CT dated 7/19/2023 12:00 PM 7/19/2023 7:00 AM.  FINDINGS:  Redemonstration of right frontal approach ventriculostomy catheter with tip in the right frontal horn. New hypodensity along the shunt catheter tract suggesting vasogenic edema. Ventricular size is slightly decreased.  New mixed density right frontal parietal subdural hematoma measuring 1.2 cm in greatest depth. This is predominantly hypodense with small areas of hyperdensity. Right-to-left midline shift measuring 3 mm. No effacement of basal cisterns.  Right frontal jamia hole. Status post bilateral intraocular lens implants. The visualized portions of the paranasal sinuses are well aerated. The mastoid air cells are well aerated.   IMPRESSION: 1. Redemonstration of right frontal approach ventriculostomy catheter with tip in the right frontal horn. New hypodensity along the shunt catheter tract suggesting vasogenic edema. Ventricular size is slightly decreased. 2. New mixed density right frontal parietal subdural hematoma measuring 1.2 cm in greatest depth. This is predominantly hypodense with small areas of hyperdensity. Right-to-left midline shift measuring 3 mm.  Attempts were made to reach the referring provider by phone. A [employer] Teams message containing the finding of new subdural hematoma was sent to DAKOTA MCNEAL at 8/10/2023 4:51 PM by Dr. Gold Bower.  --- End of Report ---

## 2023-08-15 NOTE — PHYSICAL EXAM
[General Appearance - Alert] : alert [General Appearance - In No Acute Distress] : in no acute distress [Clean] : clean [Dry] : dry [Healing Well] : healing well [Oriented To Time, Place, And Person] : oriented to person, place, and time [Impaired Insight] : insight and judgment were intact [Affect] : the affect was normal [Neck Appearance] : the appearance of the neck was normal [] : no respiratory distress [Respiration, Rhythm And Depth] : normal respiratory rhythm and effort [Skin Color & Pigmentation] : normal skin color and pigmentation

## 2023-08-15 NOTE — REVIEW OF SYSTEMS
[Fever] : no fever [Chills] : no chills [Facial Weakness] : no facial weakness [Arm Weakness] : no arm weakness [Hand Weakness] : no hand weakness [Leg Weakness] : no leg weakness [Seizures] : no convulsions [Dizziness] : no dizziness [Chest Pain] : no chest pain [Palpitations] : no palpitations [Shortness Of Breath] : no shortness of breath [As Noted in HPI] : as noted in HPI

## 2023-08-15 NOTE — REASON FOR VISIT
[Home] : at home, [unfilled] , at the time of the visit. [Medical Office: ([facility])___] : at the medical office located in  [Spouse] : spouse [Patient] : the patient [de-identified] : right VPS [de-identified] : 7/19/23

## 2023-08-15 NOTE — ASSESSMENT
[FreeTextEntry1] : 77 year- old, right handed male with PMHX of CLL, hypothyroid, HTN, HLD, gout, BPH s/p prostate ablation Nov 2022, prior misdiagnosis of Parkinsons, now off Sinemet, who presented to Dr. Kirby as referral form neurologist Dr. Alvarez for evaluation of NPH with c/o gait imbalance x several years, urinary frequency without incontinence.  S/p Right VPS (certas @ 5) 7/19/23 with Dr. Kirby. Initially post op did well and dc to [facility] 7/21/23. After returning home he notified worsening headaches for which CTH was ordered. Found to have new mixed density right subdural hematoma 1.2 cm and right to left midline shift approx 3mm on 8/10/23. Pt was sent to ER and VPS reprogrammed to @ 8.  Returned today over telehealth visit. Headaches have resolved after shunt change to 8.  Without any new neurological complaints.   He is scheduled for repeat CTH next week 8/23/23.  Plan made for him to follow- up with Dr. Kirby after CTH to review/ planning.   Educated to go to ER if new onset worsening headaches, new onset weakness, other new/worsening focal neuro deficits.   Patient and family member verbalize understanding of todays discussion and next steps in treatment plan.

## 2023-08-15 NOTE — HISTORY OF PRESENT ILLNESS
[FreeTextEntry1] : 77 year- old, right handed male with PMHX of CLL, hypothyroid, HTN, HLD, gout, BPH s/p prostate ablation Nov 2022, prior misdiagnosis of Parkinsons, now off Sinemet, who presented to Dr. Kirby as referral form neurologist Dr. Alvarez for evaluation of NPH with c/o gait imbalance x several years, urinary frequency without incontinence.  Now s/p Right VPS (certas @ 5) 7/19/23.   Hospital course uncomplicated, dc to [facility] 7/21/23  8/10/23 pt notified recent onset of mild headaches at night. CTH ordered. He had stopped taking ASA 81mg per his primary care provider >1week ago. CTH ordered which showed decreased ventricular size with new mixed density right subdural hematoma 1.2 cm and right to left midline shift approx 3mm.  He was sent to ER where VPS reprogrammed to @ 8. Neuro intact.   TODAY pt returns for routine post op follow- up.  His staples were removed while in rehab. Endorses cranial and abdominal incision healing well. Denies signs of any postop wound infection which could include but not limited to redness, swelling, purulent drainage.  Headaches have resolved after shunt adjustment to 8. Denies any weakness, dizziness, other new/worsening focal neuro deficits.  Wife endorses brighter demeanor immediate post op but now less bright again after shunt adjusted to 8.  He is planning for CTH 8/23/23. Remains off aspirin.

## 2023-08-23 ENCOUNTER — APPOINTMENT (OUTPATIENT)
Dept: CT IMAGING | Facility: HOSPITAL | Age: 78
End: 2023-08-23
Payer: MEDICARE

## 2023-08-23 ENCOUNTER — OUTPATIENT (OUTPATIENT)
Dept: OUTPATIENT SERVICES | Facility: HOSPITAL | Age: 78
LOS: 1 days | End: 2023-08-23
Payer: MEDICARE

## 2023-08-23 ENCOUNTER — NON-APPOINTMENT (OUTPATIENT)
Age: 78
End: 2023-08-23

## 2023-08-23 DIAGNOSIS — Z98.52 VASECTOMY STATUS: Chronic | ICD-10-CM

## 2023-08-23 DIAGNOSIS — E89.0 POSTPROCEDURAL HYPOTHYROIDISM: Chronic | ICD-10-CM

## 2023-08-23 DIAGNOSIS — Z98.890 OTHER SPECIFIED POSTPROCEDURAL STATES: Chronic | ICD-10-CM

## 2023-08-23 DIAGNOSIS — G91.9 HYDROCEPHALUS, UNSPECIFIED: ICD-10-CM

## 2023-08-23 PROCEDURE — 70450 CT HEAD/BRAIN W/O DYE: CPT | Mod: 26,MH

## 2023-08-23 PROCEDURE — 70450 CT HEAD/BRAIN W/O DYE: CPT

## 2023-08-24 ENCOUNTER — APPOINTMENT (OUTPATIENT)
Dept: NEUROSURGERY | Facility: CLINIC | Age: 78
End: 2023-08-24
Payer: MEDICARE

## 2023-08-24 ENCOUNTER — LABORATORY RESULT (OUTPATIENT)
Age: 78
End: 2023-08-24

## 2023-08-24 PROCEDURE — 99024 POSTOP FOLLOW-UP VISIT: CPT

## 2023-08-24 NOTE — REASON FOR VISIT
[Home] : at home, [unfilled] , at the time of the visit. [Medical Office: ([facility])___] : at the medical office located in  [Patient] : the patient [Follow-Up: _____] : a [unfilled] follow-up visit [Spouse] : spouse

## 2023-08-24 NOTE — HISTORY OF PRESENT ILLNESS
[FreeTextEntry1] : 77 year- old, right handed male with PMHX of CLL, hypothyroid, HTN, HLD, gout, BPH s/p prostate ablation Nov 2022, prior misdiagnosis of Parkinsons, now off Sinemet, who presented to Dr. Kirby as referral form neurologist Dr. Alvarez for evaluation of NPH with c/o gait imbalance x several years, urinary frequency without incontinence. Now s/p Right VPS (certas @ 5) 7/19/23.  Hospital course uncomplicated, dc to [facility] 7/21/23 Staples removed at rehab.   8/10/23 pt notified recent onset of mild headaches at night. CTH ordered. He had stopped taking ASA 81mg per his primary care provider >1week ago. CTH ordered which showed decreased ventricular size with new mixed density right subdural hematoma 1.2 cm and right to left midline shift approx 3mm. He was sent to ER where VPS reprogrammed to @ 8. Neuro intact.  8/15/23 pt returned via telehealth for f/u with NP:  Incision CDI. Headaches have resolved after shunt adjustment to 8. Wife endorses brighter demeanor immediate post op but now less bright again after shunt adjusted to 8. Remains off aspirin. Without any new weakness, other new neuro deficits. Plan made for repeat CTH 1 week.   TODAY pt returns for follow- up.  Repeated CTH yesterday with report of: previously mixed attenuation sdh decreased in size.  No new neurological symptoms.

## 2023-08-24 NOTE — ASSESSMENT
[FreeTextEntry1] :  Recommend MMA embolization next week After MMA embolization, will consider changing the shunt setting to 7. Risks benefits and alternatives to surgery discussed in detail with patient All questions answered to patient's satisfaction Will need medical clearance prior to surgery  I, Dr. Kirby, personally performed the evaluation and management (E/M) services for this established patient who presents today with (a) new problem(s)/exacerbation of (an) existing condition(s).  That E/M includes conducting the examination, assessing all new/exacerbated conditions, and establishing a new plan of care.  Today, my ACP, Kelsey Cody, was here to observe my evaluation and management services for this new problem/exacerbated condition to be followed going forward.

## 2023-08-24 NOTE — DATA REVIEWED
[de-identified] : ACC: [id number]     EXAM:  CT BRAIN   ORDERED BY: JEFF MCNEAL  PROCEDURE DATE:  08/23/2023    INTERPRETATION:  Clinical indications: Status post  shunt. Headache and subdural.  Multiple axial sections were performed from base of vertex without contrast enhancement. Coronal and sagittal reconstructions were performed.  This exam is compared prior head CT performed on August 10, 2023.  Right frontal jamia hole and shunt catheter is again seen and unchanged in position. Previously noted area of associated low-attenuation is less conspicuous.  There is evidence of a mixed attenuated (acute on chronic) subdural hematoma involving the right frontoparietal region. This finding measures approximately 1.2 cm in widest diameter and previously measured approximately 1.7 cm in widest diameter.  No significant shift or herniation is seen  Parenchymal volume loss and chronic microvascular changes are again seen unchanged  Evaluation of the osseous structures with appropriate window aside from postoperative changes appears unremarkable.  The visualized paranasal sinuses mastoid and middle ear regions appear clear.  IMPRESSION: Previously mixed attenuation subdural hematoma has decreased in size. The rest of study appear stable.  --- End of Report --

## 2023-08-25 ENCOUNTER — NON-APPOINTMENT (OUTPATIENT)
Age: 78
End: 2023-08-25

## 2023-08-25 ENCOUNTER — APPOINTMENT (OUTPATIENT)
Dept: FAMILY MEDICINE | Facility: CLINIC | Age: 78
End: 2023-08-25
Payer: MEDICARE

## 2023-08-25 VITALS
TEMPERATURE: 95.9 F | BODY MASS INDEX: 34.14 KG/M2 | DIASTOLIC BLOOD PRESSURE: 76 MMHG | HEIGHT: 70 IN | OXYGEN SATURATION: 95 % | WEIGHT: 238.5 LBS | HEART RATE: 65 BPM | SYSTOLIC BLOOD PRESSURE: 144 MMHG | RESPIRATION RATE: 16 BRPM

## 2023-08-25 DIAGNOSIS — Z01.818 ENCOUNTER FOR OTHER PREPROCEDURAL EXAMINATION: ICD-10-CM

## 2023-08-25 DIAGNOSIS — N18.32 CHRONIC KIDNEY DISEASE, STAGE 3B: ICD-10-CM

## 2023-08-25 DIAGNOSIS — R26.81 UNSTEADINESS ON FEET: ICD-10-CM

## 2023-08-25 LAB
ANION GAP SERPL CALC-SCNC: 14 MMOL/L
APTT BLD: 33.4 SEC
BUN SERPL-MCNC: 18 MG/DL
CALCIUM SERPL-MCNC: 9.2 MG/DL
CHLORIDE SERPL-SCNC: 107 MMOL/L
CO2 SERPL-SCNC: 23 MMOL/L
CREAT SERPL-MCNC: 1.49 MG/DL
EGFR: 48 ML/MIN/1.73M2
GLUCOSE SERPL-MCNC: 109 MG/DL
INR PPP: 0.95 RATIO
POTASSIUM SERPL-SCNC: 4.7 MMOL/L
PT BLD: 10.7 SEC
SODIUM SERPL-SCNC: 144 MMOL/L

## 2023-08-25 PROCEDURE — 99214 OFFICE O/P EST MOD 30 MIN: CPT

## 2023-08-25 RX ORDER — KETOCONAZOLE 20 MG/G
2 CREAM TOPICAL TWICE DAILY
Qty: 45 | Refills: 0 | Status: COMPLETED | COMMUNITY
Start: 2022-06-30 | End: 2023-08-25

## 2023-08-25 RX ORDER — NYSTATIN AND TRIAMCINOLONE ACETONIDE 100000; 1 MG/G; MG/G
100000-0.1 CREAM TOPICAL TWICE DAILY
Qty: 35 | Refills: 0 | Status: COMPLETED | COMMUNITY
Start: 2022-06-30 | End: 2023-08-25

## 2023-08-25 RX ORDER — NYSTATIN 100000 1/G
100000 POWDER TOPICAL TWICE DAILY
Qty: 1 | Refills: 1 | Status: COMPLETED | COMMUNITY
Start: 2022-11-30 | End: 2023-08-25

## 2023-08-25 RX ORDER — NYSTATIN 100000 1/G
100000 POWDER TOPICAL
Qty: 1 | Refills: 0 | Status: COMPLETED | COMMUNITY
Start: 2022-06-30 | End: 2023-08-25

## 2023-08-25 NOTE — PHYSICAL EXAM
[No Acute Distress] : no acute distress [Well Nourished] : well nourished [Well Developed] : well developed [Well-Appearing] : well-appearing [Normal Sclera/Conjunctiva] : normal sclera/conjunctiva [PERRL] : pupils equal round and reactive to light [EOMI] : extraocular movements intact [Normal Outer Ear/Nose] : the outer ears and nose were normal in appearance [Normal Oropharynx] : the oropharynx was normal [No JVD] : no jugular venous distention [No Lymphadenopathy] : no lymphadenopathy [Supple] : supple [Thyroid Normal, No Nodules] : the thyroid was normal and there were no nodules present [No Respiratory Distress] : no respiratory distress  [No Accessory Muscle Use] : no accessory muscle use [Clear to Auscultation] : lungs were clear to auscultation bilaterally [Normal Rate] : normal rate  [Regular Rhythm] : with a regular rhythm [Normal S1, S2] : normal S1 and S2 [No Murmur] : no murmur heard [No Carotid Bruits] : no carotid bruits [No Abdominal Bruit] : a ~M bruit was not heard ~T in the abdomen [No Varicosities] : no varicosities [Pedal Pulses Present] : the pedal pulses are present [No Edema] : there was no peripheral edema [No Palpable Aorta] : no palpable aorta [No Extremity Clubbing/Cyanosis] : no extremity clubbing/cyanosis [Non Tender] : non-tender [Soft] : abdomen soft [Non-distended] : non-distended [No Masses] : no abdominal mass palpated [Normal Bowel Sounds] : normal bowel sounds [No HSM] : no HSM [Normal Posterior Cervical Nodes] : no posterior cervical lymphadenopathy [Normal Anterior Cervical Nodes] : no anterior cervical lymphadenopathy [No CVA Tenderness] : no CVA  tenderness [No Spinal Tenderness] : no spinal tenderness [No Joint Swelling] : no joint swelling [Grossly Normal Strength/Tone] : grossly normal strength/tone [No Rash] : no rash [Coordination Grossly Intact] : coordination grossly intact [No Focal Deficits] : no focal deficits [Normal Affect] : the affect was normal [Normal Insight/Judgement] : insight and judgment were intact

## 2023-08-25 NOTE — ASSESSMENT
[Patient Optimized for Surgery] : Patient optimized for surgery [No Further Testing Recommended] : no further testing recommended [Continue medications as is] : Continue current medications [As per surgery] : as per surgery [FreeTextEntry7] :  d/c all OTC medications/supplements one week prior to procedure, patient and spouse verbalized understanding.

## 2023-08-25 NOTE — HISTORY OF PRESENT ILLNESS
[No Pertinent Cardiac History] : no history of aortic stenosis, atrial fibrillation, coronary artery disease, recent myocardial infarction, or implantable device/pacemaker [No Pertinent Pulmonary History] : no history of asthma, COPD, sleep apnea, or smoking [No Adverse Anesthesia Reaction] : no adverse anesthesia reaction in self or family member [Chronic Kidney Disease] : chronic kidney disease [Good (7-10 METs)] : Good (7-10 METs) [Herbal Supplements: _____] : Herbal Supplements: [unfilled] [(Patient denies any chest pain, claudication, dyspnea on exertion, orthopnea, palpitations or syncope)] : Patient denies any chest pain, claudication, dyspnea on exertion, orthopnea, palpitations or syncope [Spouse] : spouse [Aortic Stenosis] : no aortic stenosis [Atrial Fibrillation] : no atrial fibrillation [Coronary Artery Disease] : no coronary artery disease [Recent Myocardial Infarction] : no recent myocardial infarction [Implantable Device/Pacemaker] : no implantable device/pacemaker [Asthma] : no asthma [COPD] : no COPD [Sleep Apnea] : no sleep apnea [Smoker] : not a smoker [Family Member] : no family member with adverse anesthesia reaction/sudden death [Self] : no previous adverse anesthesia reaction [Chronic Anticoagulation] : no chronic anticoagulation [Diabetes] : no diabetes [FreeTextEntry1] : MMA embolization  [FreeTextEntry3] : Dr. Kirby  [FreeTextEntry2] : 8/30/2023  [FreeTextEntry4] : 76 yo male with PMHx CLL, HTN (metoprolol, irbesartan) HLD (simvastatin) BPH (finasteride) gout (allopurinol) hypothyroid (levothyroxine) CKD stage III presents to the office for pre-operative clearance for MMA embolization  s/p right  shunt 7/19/2023 8/2023 found to have new right subdural hematoma, off of ASA  recommending MMA embolization denies any other associated symptoms denies any other complaints or concerns at this time  [FreeTextEntry5] : former smoker 2 PPD x 20 years; quit 40 years ago  [FreeTextEntry7] : Dr. Barone ([facility])- cardiology  EKG done June 2023 NSR Echo done 8/17/2023- EF 55-60 %

## 2023-08-26 LAB
BASOPHILS # BLD AUTO: 0.14 K/UL
BASOPHILS NFR BLD AUTO: 0.3 %
EOSINOPHIL # BLD AUTO: 0.12 K/UL
EOSINOPHIL NFR BLD AUTO: 0.3 %
HCT VFR BLD CALC: 44 %
HGB BLD-MCNC: 13.1 G/DL
IMM GRANULOCYTES NFR BLD AUTO: 0.3 %
LYMPHOCYTES # BLD AUTO: 37.27 K/UL
LYMPHOCYTES NFR BLD AUTO: 85.3 %
MAN DIFF?: NORMAL
MCHC RBC-ENTMCNC: 29.8 GM/DL
MCHC RBC-ENTMCNC: 31.7 PG
MCV RBC AUTO: 106.5 FL
MONOCYTES # BLD AUTO: 0.67 K/UL
MONOCYTES NFR BLD AUTO: 1.5 %
NEUTROPHILS # BLD AUTO: 5.37 K/UL
NEUTROPHILS NFR BLD AUTO: 12.3 %
PLATELET # BLD AUTO: 151 K/UL
RBC # BLD: 4.13 M/UL
RBC # FLD: 14 %
WBC # FLD AUTO: 43.71 K/UL

## 2023-08-29 NOTE — PATIENT PROFILE ADULT - PACKS PER DAY
Received  Ventilated patient, properly restrained.  Verified orders, pt in no distress at this time 2

## 2023-08-30 ENCOUNTER — INPATIENT (INPATIENT)
Facility: HOSPITAL | Age: 78
LOS: 0 days | Discharge: HOME | DRG: 21 | End: 2023-08-31
Attending: NEUROLOGICAL SURGERY | Admitting: NEUROLOGICAL SURGERY
Payer: MEDICARE

## 2023-08-30 ENCOUNTER — TRANSCRIPTION ENCOUNTER (OUTPATIENT)
Age: 78
End: 2023-08-30

## 2023-08-30 VITALS
SYSTOLIC BLOOD PRESSURE: 151 MMHG | RESPIRATION RATE: 16 BRPM | DIASTOLIC BLOOD PRESSURE: 77 MMHG | HEART RATE: 56 BPM | OXYGEN SATURATION: 95 %

## 2023-08-30 DIAGNOSIS — I62.03 NONTRAUMATIC CHRONIC SUBDURAL HEMORRHAGE: ICD-10-CM

## 2023-08-30 DIAGNOSIS — E78.5 HYPERLIPIDEMIA, UNSPECIFIED: ICD-10-CM

## 2023-08-30 DIAGNOSIS — Z87.438 PERSONAL HISTORY OF OTHER DISEASES OF MALE GENITAL ORGANS: ICD-10-CM

## 2023-08-30 DIAGNOSIS — N40.0 BENIGN PROSTATIC HYPERPLASIA WITHOUT LOWER URINARY TRACT SYMPTOMS: ICD-10-CM

## 2023-08-30 DIAGNOSIS — G91.2 (IDIOPATHIC) NORMAL PRESSURE HYDROCEPHALUS: ICD-10-CM

## 2023-08-30 DIAGNOSIS — I10 ESSENTIAL (PRIMARY) HYPERTENSION: ICD-10-CM

## 2023-08-30 DIAGNOSIS — D72.828 OTHER ELEVATED WHITE BLOOD CELL COUNT: ICD-10-CM

## 2023-08-30 DIAGNOSIS — I97.51 ACCIDENTAL PUNCTURE AND LACERATION OF A CIRCULATORY SYSTEM ORGAN OR STRUCTURE DURING A CIRCULATORY SYSTEM PROCEDURE: ICD-10-CM

## 2023-08-30 DIAGNOSIS — N18.30 CHRONIC KIDNEY DISEASE, STAGE 3 UNSPECIFIED: ICD-10-CM

## 2023-08-30 DIAGNOSIS — S06.5XAA TRAUMATIC SUBDURAL HEMORRHAGE WITH LOSS OF CONSCIOUSNESS STATUS UNKNOWN, INITIAL ENCOUNTER: ICD-10-CM

## 2023-08-30 DIAGNOSIS — Z86.16 PERSONAL HISTORY OF COVID-19: ICD-10-CM

## 2023-08-30 DIAGNOSIS — E89.0 POSTPROCEDURAL HYPOTHYROIDISM: Chronic | ICD-10-CM

## 2023-08-30 DIAGNOSIS — H40.9 UNSPECIFIED GLAUCOMA: ICD-10-CM

## 2023-08-30 DIAGNOSIS — C91.10 CHRONIC LYMPHOCYTIC LEUKEMIA OF B-CELL TYPE NOT HAVING ACHIEVED REMISSION: ICD-10-CM

## 2023-08-30 DIAGNOSIS — I12.9 HYPERTENSIVE CHRONIC KIDNEY DISEASE WITH STAGE 1 THROUGH STAGE 4 CHRONIC KIDNEY DISEASE, OR UNSPECIFIED CHRONIC KIDNEY DISEASE: ICD-10-CM

## 2023-08-30 DIAGNOSIS — Z98.890 OTHER SPECIFIED POSTPROCEDURAL STATES: Chronic | ICD-10-CM

## 2023-08-30 DIAGNOSIS — Z98.52 VASECTOMY STATUS: Chronic | ICD-10-CM

## 2023-08-30 DIAGNOSIS — Z85.89 PERSONAL HISTORY OF MALIGNANT NEOPLASM OF OTHER ORGANS AND SYSTEMS: ICD-10-CM

## 2023-08-30 DIAGNOSIS — E03.9 HYPOTHYROIDISM, UNSPECIFIED: ICD-10-CM

## 2023-08-30 DIAGNOSIS — M10.9 GOUT, UNSPECIFIED: ICD-10-CM

## 2023-08-30 DIAGNOSIS — K21.9 GASTRO-ESOPHAGEAL REFLUX DISEASE WITHOUT ESOPHAGITIS: ICD-10-CM

## 2023-08-30 LAB
ALBUMIN SERPL ELPH-MCNC: 3.7 G/DL — SIGNIFICANT CHANGE UP (ref 3.3–5)
ALP SERPL-CCNC: 97 U/L — SIGNIFICANT CHANGE UP (ref 40–120)
ALT FLD-CCNC: 10 U/L — SIGNIFICANT CHANGE UP (ref 10–45)
ANION GAP SERPL CALC-SCNC: 13 MMOL/L — SIGNIFICANT CHANGE UP (ref 5–17)
AST SERPL-CCNC: 15 U/L — SIGNIFICANT CHANGE UP (ref 10–40)
BASOPHILS # BLD AUTO: 0 K/UL — SIGNIFICANT CHANGE UP (ref 0–0.2)
BASOPHILS NFR BLD AUTO: 0 % — SIGNIFICANT CHANGE UP (ref 0–2)
BILIRUB SERPL-MCNC: 0.3 MG/DL — SIGNIFICANT CHANGE UP (ref 0.2–1.2)
BUN SERPL-MCNC: 22 MG/DL — SIGNIFICANT CHANGE UP (ref 7–23)
CALCIUM SERPL-MCNC: 8.8 MG/DL — SIGNIFICANT CHANGE UP (ref 8.4–10.5)
CHLORIDE SERPL-SCNC: 108 MMOL/L — SIGNIFICANT CHANGE UP (ref 96–108)
CO2 SERPL-SCNC: 22 MMOL/L — SIGNIFICANT CHANGE UP (ref 22–31)
CREAT SERPL-MCNC: 1.64 MG/DL — HIGH (ref 0.5–1.3)
EGFR: 43 ML/MIN/1.73M2 — LOW
EOSINOPHIL # BLD AUTO: 0 K/UL — SIGNIFICANT CHANGE UP (ref 0–0.5)
EOSINOPHIL NFR BLD AUTO: 0 % — SIGNIFICANT CHANGE UP (ref 0–6)
GLUCOSE SERPL-MCNC: 253 MG/DL — HIGH (ref 70–99)
HCT VFR BLD CALC: 41.4 % — SIGNIFICANT CHANGE UP (ref 39–50)
HGB BLD-MCNC: 12.9 G/DL — LOW (ref 13–17)
LYMPHOCYTES # BLD AUTO: 40.93 K/UL — HIGH (ref 1–3.3)
LYMPHOCYTES # BLD AUTO: 75 % — HIGH (ref 13–44)
MAGNESIUM SERPL-MCNC: 2 MG/DL — SIGNIFICANT CHANGE UP (ref 1.6–2.6)
MANUAL SMEAR VERIFICATION: SIGNIFICANT CHANGE UP
MCHC RBC-ENTMCNC: 31.2 GM/DL — LOW (ref 32–36)
MCHC RBC-ENTMCNC: 32.5 PG — SIGNIFICANT CHANGE UP (ref 27–34)
MCV RBC AUTO: 104.3 FL — HIGH (ref 80–100)
MONOCYTES # BLD AUTO: 0.98 K/UL — HIGH (ref 0–0.9)
MONOCYTES NFR BLD AUTO: 1.8 % — LOW (ref 2–14)
NEUTROPHILS # BLD AUTO: 12.66 K/UL — HIGH (ref 1.8–7.4)
NEUTROPHILS NFR BLD AUTO: 20.5 % — LOW (ref 43–77)
NEUTS BAND # BLD: 2.7 % — SIGNIFICANT CHANGE UP (ref 0–8)
PHOSPHATE SERPL-MCNC: 3.7 MG/DL — SIGNIFICANT CHANGE UP (ref 2.5–4.5)
PLAT MORPH BLD: NORMAL — SIGNIFICANT CHANGE UP
PLATELET # BLD AUTO: 141 K/UL — LOW (ref 150–400)
POTASSIUM SERPL-MCNC: 4.4 MMOL/L — SIGNIFICANT CHANGE UP (ref 3.5–5.3)
POTASSIUM SERPL-SCNC: 4.4 MMOL/L — SIGNIFICANT CHANGE UP (ref 3.5–5.3)
PROT SERPL-MCNC: 6 G/DL — SIGNIFICANT CHANGE UP (ref 6–8.3)
RBC # BLD: 3.97 M/UL — LOW (ref 4.2–5.8)
RBC # FLD: 13.8 % — SIGNIFICANT CHANGE UP (ref 10.3–14.5)
RBC BLD AUTO: NORMAL — SIGNIFICANT CHANGE UP
SMUDGE CELLS # BLD: PRESENT — SIGNIFICANT CHANGE UP
SODIUM SERPL-SCNC: 143 MMOL/L — SIGNIFICANT CHANGE UP (ref 135–145)
WBC # BLD: 54.57 K/UL — CRITICAL HIGH (ref 3.8–10.5)
WBC # FLD AUTO: 54.57 K/UL — CRITICAL HIGH (ref 3.8–10.5)

## 2023-08-30 PROCEDURE — 36223 PLACE CATH CAROTID/INOM ART: CPT | Mod: 58,RT

## 2023-08-30 PROCEDURE — 36227 PLACE CATH XTRNL CAROTID: CPT | Mod: 58,RT

## 2023-08-30 PROCEDURE — 75894 X-RAYS TRANSCATH THERAPY: CPT | Mod: 26

## 2023-08-30 PROCEDURE — 61624 TCAT PERM OCCLS/EMBOLJ CNS: CPT | Mod: 58

## 2023-08-30 PROCEDURE — 75898 FOLLOW-UP ANGIOGRAPHY: CPT | Mod: 26

## 2023-08-30 RX ORDER — LEVOTHYROXINE SODIUM 125 MCG
88 TABLET ORAL DAILY
Refills: 0 | Status: DISCONTINUED | OUTPATIENT
Start: 2023-08-30 | End: 2023-08-31

## 2023-08-30 RX ORDER — CHLORHEXIDINE GLUCONATE 213 G/1000ML
1 SOLUTION TOPICAL ONCE
Refills: 0 | Status: DISCONTINUED | OUTPATIENT
Start: 2023-08-30 | End: 2023-08-30

## 2023-08-30 RX ORDER — SIMVASTATIN 20 MG/1
10 TABLET, FILM COATED ORAL AT BEDTIME
Refills: 0 | Status: DISCONTINUED | OUTPATIENT
Start: 2023-08-30 | End: 2023-08-31

## 2023-08-30 RX ORDER — VALSARTAN 80 MG/1
160 TABLET ORAL DAILY
Refills: 0 | Status: DISCONTINUED | OUTPATIENT
Start: 2023-08-30 | End: 2023-08-31

## 2023-08-30 RX ORDER — SODIUM CHLORIDE 9 MG/ML
1000 INJECTION INTRAMUSCULAR; INTRAVENOUS; SUBCUTANEOUS
Refills: 0 | Status: DISCONTINUED | OUTPATIENT
Start: 2023-08-30 | End: 2023-08-30

## 2023-08-30 RX ORDER — METOPROLOL TARTRATE 50 MG
100 TABLET ORAL DAILY
Refills: 0 | Status: DISCONTINUED | OUTPATIENT
Start: 2023-08-30 | End: 2023-08-31

## 2023-08-30 RX ORDER — FINASTERIDE 5 MG/1
5 TABLET, FILM COATED ORAL DAILY
Refills: 0 | Status: DISCONTINUED | OUTPATIENT
Start: 2023-08-30 | End: 2023-08-31

## 2023-08-30 RX ORDER — LANOLIN ALCOHOL/MO/W.PET/CERES
3 CREAM (GRAM) TOPICAL AT BEDTIME
Refills: 0 | Status: DISCONTINUED | OUTPATIENT
Start: 2023-08-30 | End: 2023-08-31

## 2023-08-30 RX ORDER — SENNA PLUS 8.6 MG/1
2 TABLET ORAL AT BEDTIME
Refills: 0 | Status: DISCONTINUED | OUTPATIENT
Start: 2023-08-30 | End: 2023-08-31

## 2023-08-30 RX ORDER — HYDRALAZINE HCL 50 MG
10 TABLET ORAL ONCE
Refills: 0 | Status: COMPLETED | OUTPATIENT
Start: 2023-08-30 | End: 2023-08-30

## 2023-08-30 RX ORDER — ACETAMINOPHEN 500 MG
650 TABLET ORAL EVERY 6 HOURS
Refills: 0 | Status: DISCONTINUED | OUTPATIENT
Start: 2023-08-30 | End: 2023-08-31

## 2023-08-30 RX ADMIN — Medication 100 MILLIGRAM(S): at 19:41

## 2023-08-30 RX ADMIN — Medication 3 MILLIGRAM(S): at 22:23

## 2023-08-30 RX ADMIN — Medication 10 MILLIGRAM(S): at 18:33

## 2023-08-30 RX ADMIN — SIMVASTATIN 10 MILLIGRAM(S): 20 TABLET, FILM COATED ORAL at 22:23

## 2023-08-30 RX ADMIN — SODIUM CHLORIDE 100 MILLILITER(S): 9 INJECTION INTRAMUSCULAR; INTRAVENOUS; SUBCUTANEOUS at 14:29

## 2023-08-30 RX ADMIN — Medication 650 MILLIGRAM(S): at 21:00

## 2023-08-30 RX ADMIN — Medication 650 MILLIGRAM(S): at 20:23

## 2023-08-30 RX ADMIN — FINASTERIDE 5 MILLIGRAM(S): 5 TABLET, FILM COATED ORAL at 18:21

## 2023-08-30 NOTE — H&P ADULT - HISTORY OF PRESENT ILLNESS
76 y/o male PMHx BPH s/p TURP 2022, CKD III, CLL, HTN, Hypothyroidism, Gout, Parkinson's misdiagnosis now completely weaned off sinemet, s/p cataract surgery bilateral and hemithyroidectomy, NPH s/p R frontal  shunt (Certas @ 8) with Dr. Kirby 7/19/23, right SDH presenting for right MMA embolization.     Patient was initially referred to Dr. Kirby as referral from neurologist Dr. Alvarez for evaluation of NPH with c/o gait imbalance x several years, urinary frequency without incontinence.  Now s/p Right VPS (certas @ 5) 7/19/23. Hospital course uncomplicated, dc to [facility] 7/21/23. Staples removed at rehab.   On 8/10/23 pt had recent onset of mild headaches at night. CTH ordered. He had stopped taking ASA 81mg per his primary care provider >1week ago. CTH ordered which showed decreased ventricular size with new mixed density right subdural hematoma 1.2 cm and right to left midline shift approx 3mm. He was sent to ER where VPS reprogrammed to @ 8. Neuro intact.  On 8/15/23 pt returned via telehealth for f/u with NP. Incision CDI. Headaches have resolved after shunt adjustment to 8. Wife endorses brighter demeanor immediate post op but now less bright again after shunt adjusted to 8. Remains off aspirin. Without any new weakness, other new neuro deficits. Plan made for repeat CTH 1 week.     Repeated CTH showing mixed attenuation SDH decreased in size. No new neurological symptoms. Patient presents today for right MMA embolization.  78 y/o male PMHx BPH s/p TURP 2022, CKD III, CLL, HTN, Hypothyroidism, Gout, Parkinson's misdiagnosis now completely weaned off sinemet, s/p cataract surgery bilateral and hemithyroidectomy, NPH s/p R frontal  shunt (Certas @ 8) with Dr. Kirby 7/19/23, right SDH presenting for right MMA embolization.     Patient was initially referred to Dr. Kirby as referral from neurologist Dr. Alvarez for evaluation of NPH with c/o gait imbalance x several years, urinary frequency without incontinence.  Now s/p Right VPS (certas @ 5) 7/19/23. Hospital course uncomplicated, dc to [facility] 7/21/23. Staples removed at rehab.   On 8/10/23 pt had recent onset of mild headaches at night. CTH ordered. He had stopped taking ASA 81mg per his primary care provider >1week ago. CTH ordered which showed decreased ventricular size with new mixed density right subdural hematoma 1.2 cm and right to left midline shift approx 3mm. He was sent to ER where VPS reprogrammed to @ 8. Neuro intact.  On 8/15/23 pt returned via telehealth for f/u with NP. Incision CDI. Headaches have resolved after shunt adjustment to 8. Wife endorses brighter demeanor immediate post op but now less bright again after shunt adjusted to 8. Remains off aspirin. Without any new weakness, other new neuro deficits. Plan made for repeat CTH 1 week.     Repeated CTH showing mixed attenuation SDH decreased in size. No new neurological symptoms. Patient presents today for right MMA embolization. Consideration of changing shunt setting to 7 after MMA embolization

## 2023-08-30 NOTE — H&P ADULT - NSICDXPASTMEDICALHX_GEN_ALL_CORE_FT
PAST MEDICAL HISTORY:  Basal cell carcinoma head    CLL (chronic lymphocytic leukemia)     Enlarged prostate with lower urinary tract symptoms (LUTS)     GERD (gastroesophageal reflux disease)     Glaucoma no eye drops    Gout no attack > 5 years    History of 2019 novel coronavirus disease (COVID-19) April 2020, fatigue, fever and confusion. no hospitalization    HLD (hyperlipidemia)     HTN (hypertension)     Hypothyroidism     NPH (normal pressure hydrocephalus)     SDH (subdural hematoma)

## 2023-08-30 NOTE — H&P ADULT - PROBLEM SELECTOR PLAN 1
- Consent for cerebral angiogram with Right MMA embolization obtained and placed in chart. RIsks and benefits of procedure explained and discussed.  - NPO/IVF  - Admit to stepdown unit     Discussed with Dr. Kirby

## 2023-08-30 NOTE — BRIEF OPERATIVE NOTE - OPERATION/FINDINGS
Patient was brought to the angio suite, placed on x-ray table, placed on standard monitor by anesthesiologist. B/l groins were prepped and draped in usual sterile fashion.   6 Lithuanian short sheath was used in the right common femoral artery for access. A diagnostic angiogram was performed under general anesthesia care. B/l ECA was injected and studied.     The right MMA was microcatheterized and coil embolized successfully.     Full report to follow  Patient tolerated the procedure well and at baseline neurological condition.   Right groin vascular access site was closed with vascade and applied manual compression.   There is no hematoma.   Patient was transferred to cath lab recovery and then to stepdown unit.     Above discussed with Dr. Morales. Patient was brought to the angio suite, placed on x-ray table, placed on standard monitor by anesthesiologist. B/l groins were prepped and draped in usual sterile fashion.   6 [place]ian short sheath was used in the right common femoral artery for access. A diagnostic angiogram was performed under general anesthesia care. B/l ECA was injected and studied.     The right MMA was microcatheterized. There was a small perforation of MMA artery during manipulation. Coil embolized successfully without any sequela.     Full report to follow  Patient tolerated the procedure well and at baseline neurological condition.   Right groin vascular access site was closed with vascade and applied manual compression.   There is no hematoma.   Patient was transferred to cath lab recovery and then to stepdown unit.     Above discussed with Dr. Morales.

## 2023-08-30 NOTE — H&P ADULT - ASSESSMENT
76 y/o male PMHx BPH s/p TURP 2022, CKD III, CLL, HTN, Hypothyroidism, Gout, Parkinson's misdiagnosis now completely weaned off sinemet, s/p cataract surgery bilateral and hemithyroidectomy, NPH s/p R frontal  shunt (Certas @ 8) with Dr. Kirby 7/19/23, right SDH presenting for right MMA embolization.

## 2023-08-30 NOTE — H&P ADULT - NSHPPHYSICALEXAM_GEN_ALL_CORE
Neuro: A&Ox3, speech clear. Answers questions appropriately  CN: CN II-XII intact.   Facial movements symmetrical, no facial droop, tongue protrusion midline.   No dysarthria.  Motor strength: Full and equal 5/5 strength B/L upper and lower extremities  Sensory:  Sensation intact.  Cerebellar: normal finger to nose, normal heel to shin     CHEST/LUNG: Clear to auscultation bilaterally; No rales, rhonchi, wheezing, or rubs  HEART: Regular rate and rhythm; No murmurs, rubs, or gallops  ABDOMEN: Soft, Nontender, Nondistended; Bowel sounds present  EXTREMITIES:  2+ DP pulses, No clubbing, cyanosis, or edema  SKIN: No rashes or lesions

## 2023-08-31 ENCOUNTER — TRANSCRIPTION ENCOUNTER (OUTPATIENT)
Age: 78
End: 2023-08-31

## 2023-08-31 VITALS — HEART RATE: 54 BPM | SYSTOLIC BLOOD PRESSURE: 160 MMHG | DIASTOLIC BLOOD PRESSURE: 77 MMHG | RESPIRATION RATE: 17 BRPM

## 2023-08-31 PROBLEM — S06.5XAA TRAUMATIC SUBDURAL HEMORRHAGE WITH LOSS OF CONSCIOUSNESS STATUS UNKNOWN, INITIAL ENCOUNTER: Chronic | Status: ACTIVE | Noted: 2023-08-30

## 2023-08-31 LAB
ANION GAP SERPL CALC-SCNC: 10 MMOL/L — SIGNIFICANT CHANGE UP (ref 5–17)
BUN SERPL-MCNC: 24 MG/DL — HIGH (ref 7–23)
CALCIUM SERPL-MCNC: 8.5 MG/DL — SIGNIFICANT CHANGE UP (ref 8.4–10.5)
CHLORIDE SERPL-SCNC: 110 MMOL/L — HIGH (ref 96–108)
CO2 SERPL-SCNC: 21 MMOL/L — LOW (ref 22–31)
CREAT SERPL-MCNC: 1.66 MG/DL — HIGH (ref 0.5–1.3)
EGFR: 42 ML/MIN/1.73M2 — LOW
GLUCOSE SERPL-MCNC: 120 MG/DL — HIGH (ref 70–99)
HCT VFR BLD CALC: 38.6 % — LOW (ref 39–50)
HGB BLD-MCNC: 11.6 G/DL — LOW (ref 13–17)
MAGNESIUM SERPL-MCNC: 2.2 MG/DL — SIGNIFICANT CHANGE UP (ref 1.6–2.6)
MCHC RBC-ENTMCNC: 30.1 GM/DL — LOW (ref 32–36)
MCHC RBC-ENTMCNC: 32 PG — SIGNIFICANT CHANGE UP (ref 27–34)
MCV RBC AUTO: 106.3 FL — HIGH (ref 80–100)
NRBC # BLD: 0 /100 WBCS — SIGNIFICANT CHANGE UP (ref 0–0)
PHOSPHATE SERPL-MCNC: 3.8 MG/DL — SIGNIFICANT CHANGE UP (ref 2.5–4.5)
PLATELET # BLD AUTO: 152 K/UL — SIGNIFICANT CHANGE UP (ref 150–400)
POTASSIUM SERPL-MCNC: 5.5 MMOL/L — HIGH (ref 3.5–5.3)
POTASSIUM SERPL-SCNC: 5.5 MMOL/L — HIGH (ref 3.5–5.3)
RBC # BLD: 3.63 M/UL — LOW (ref 4.2–5.8)
RBC # FLD: 13.7 % — SIGNIFICANT CHANGE UP (ref 10.3–14.5)
SODIUM SERPL-SCNC: 141 MMOL/L — SIGNIFICANT CHANGE UP (ref 135–145)
WBC # BLD: 48.27 K/UL — CRITICAL HIGH (ref 3.8–10.5)
WBC # FLD AUTO: 48.27 K/UL — CRITICAL HIGH (ref 3.8–10.5)

## 2023-08-31 PROCEDURE — 83735 ASSAY OF MAGNESIUM: CPT

## 2023-08-31 PROCEDURE — C1889: CPT

## 2023-08-31 PROCEDURE — 97165 OT EVAL LOW COMPLEX 30 MIN: CPT

## 2023-08-31 PROCEDURE — 62252 CSF SHUNT REPROGRAM: CPT | Mod: 26,AS

## 2023-08-31 PROCEDURE — C1887: CPT

## 2023-08-31 PROCEDURE — 70450 CT HEAD/BRAIN W/O DYE: CPT | Mod: 26

## 2023-08-31 PROCEDURE — C1894: CPT

## 2023-08-31 PROCEDURE — 85025 COMPLETE CBC W/AUTO DIFF WBC: CPT

## 2023-08-31 PROCEDURE — C1760: CPT

## 2023-08-31 PROCEDURE — C1769: CPT

## 2023-08-31 PROCEDURE — 97162 PT EVAL MOD COMPLEX 30 MIN: CPT

## 2023-08-31 PROCEDURE — 85027 COMPLETE CBC AUTOMATED: CPT

## 2023-08-31 PROCEDURE — 70450 CT HEAD/BRAIN W/O DYE: CPT

## 2023-08-31 PROCEDURE — 36415 COLL VENOUS BLD VENIPUNCTURE: CPT

## 2023-08-31 PROCEDURE — 84100 ASSAY OF PHOSPHORUS: CPT

## 2023-08-31 PROCEDURE — 80048 BASIC METABOLIC PNL TOTAL CA: CPT

## 2023-08-31 PROCEDURE — 80053 COMPREHEN METABOLIC PANEL: CPT

## 2023-08-31 RX ORDER — VALSARTAN 80 MG/1
1 TABLET ORAL
Qty: 30 | Refills: 0
Start: 2023-08-31 | End: 2023-09-29

## 2023-08-31 RX ADMIN — Medication 88 MICROGRAM(S): at 06:49

## 2023-08-31 RX ADMIN — Medication 100 MILLIGRAM(S): at 08:09

## 2023-08-31 RX ADMIN — VALSARTAN 160 MILLIGRAM(S): 80 TABLET ORAL at 08:09

## 2023-08-31 RX ADMIN — FINASTERIDE 5 MILLIGRAM(S): 5 TABLET, FILM COATED ORAL at 12:51

## 2023-08-31 NOTE — OCCUPATIONAL THERAPY INITIAL EVALUATION ADULT - GENERAL OBSERVATIONS, REHAB EVAL
OT IE complete. LAUREL Fowler clearing pt. for OOB. Pt. received sitting upright in chair, +tele, +heplock in NAD w. wife bedside agreeable to therapy session

## 2023-08-31 NOTE — DISCHARGE NOTE PROVIDER - CARE PROVIDER_API CALL
Clayton Kirby  Neurosurgery  [address] [place] [address]  Phone: [phone]  Fax: [phone]  Follow Up Time:

## 2023-08-31 NOTE — DISCHARGE NOTE NURSING/CASE MANAGEMENT/SOCIAL WORK - NSDCPEFALRISK_GEN_ALL_CORE
For information on Fall & Injury Prevention, visit: [email or web address] OR  [email or web address] OR  https://www.cdc.gov/steadi/patient.html

## 2023-08-31 NOTE — OCCUPATIONAL THERAPY INITIAL EVALUATION ADULT - PERTINENT HX OF CURRENT PROBLEM, REHAB EVAL
78 y/o male PMHx BPH s/p TURP 2022, CKD III, CLL, HTN, Hypothyroidism, Gout, Parkinson's misdiagnosis now completely weaned off sinemet, s/p cataract surgery bilateral and hemithyroidectomy, NPH s/p R frontal  shunt (Certas @ 8) with Dr. Kirby 7/19/23, right SDH presenting for right MMA embolization (8/30)

## 2023-08-31 NOTE — DISCHARGE NOTE PROVIDER - NSDCFUADDAPPT_GEN_ALL_CORE_FT
Please follow up with Dr. Kirby, call the office to make/confirm appointment at [phone]    Please follow up with your primary care doctor

## 2023-08-31 NOTE — OCCUPATIONAL THERAPY INITIAL EVALUATION ADULT - MODIFIED CLINICAL TEST OF SENSORY INTEGRATION IN BALANCE TEST
Pt. performed functional mobility in hallway w. SC and CGA, no LOB, slight R sided preference however able to navigate obstacles with occasional cue

## 2023-08-31 NOTE — DISCHARGE NOTE NURSING/CASE MANAGEMENT/SOCIAL WORK - PATIENT PORTAL LINK FT
You can access the FollowMyHealth Patient Portal offered by [facility] by registering at the following website: [email or web address]. By joining [employer]’s FollowMyHealth portal, you will also be able to view your health information using other applications (apps) compatible with our system.

## 2023-08-31 NOTE — DISCHARGE NOTE PROVIDER - NSDCCPCAREPLAN_GEN_ALL_CORE_FT
PRINCIPAL DISCHARGE DIAGNOSIS  Diagnosis: SDH (subdural hematoma)  Assessment and Plan of Treatment: s/p cerebral angiogram with coil embolization of right MMA 8/30      SECONDARY DISCHARGE DIAGNOSES  Diagnosis: HLD (hyperlipidemia)  Assessment and Plan of Treatment: continue home Simvastatin    Diagnosis: Hypothyroid  Assessment and Plan of Treatment: continue home Synthroid    Diagnosis: HTN (hypertension)  Assessment and Plan of Treatment: contiue home Metoprolol, Valsartan    Diagnosis: History of BPH  Assessment and Plan of Treatment: continue home Finasteride     PRINCIPAL DISCHARGE DIAGNOSIS  Diagnosis: SDH (subdural hematoma)  Assessment and Plan of Treatment: s/p cerebral angiogram with coil embolization of right MMA 8/30      SECONDARY DISCHARGE DIAGNOSES  Diagnosis: HLD (hyperlipidemia)  Assessment and Plan of Treatment: continue home Simvastatin    Diagnosis: Hypothyroid  Assessment and Plan of Treatment: continue home Synthroid    Diagnosis: HTN (hypertension)  Assessment and Plan of Treatment: contiue home Metoprolol, HOLD home Valsartan for 2 days, you may resume on 9/2    Diagnosis: History of BPH  Assessment and Plan of Treatment: continue home Finasteride     PRINCIPAL DISCHARGE DIAGNOSIS  Diagnosis: SDH (subdural hematoma)  Assessment and Plan of Treatment: s/p cerebral angiogram with coil embolization of right MMA 8/30      SECONDARY DISCHARGE DIAGNOSES  Diagnosis: HLD (hyperlipidemia)  Assessment and Plan of Treatment: continue home Simvastatin    Diagnosis: Hypothyroid  Assessment and Plan of Treatment: continue home Synthroid    Diagnosis: HTN (hypertension)  Assessment and Plan of Treatment: contiue home Metoprolol, HOLD home Valsartan for 2 days, you may resume on 9/2    Diagnosis: History of BPH  Assessment and Plan of Treatment: continue home Finasteride    Diagnosis: History of ventriculoperitoneal shunting  Assessment and Plan of Treatment: Certas valve adjusted to 7 from 8 on 8/31

## 2023-08-31 NOTE — OCCUPATIONAL THERAPY INITIAL EVALUATION ADULT - DIAGNOSIS, OT EVAL
Pt. is POD#1(8/30/32) s/p R MMA embolization. Upon assessment, pt. demo slight impairments in activity tolerance and balance impacting pt. engagement in ADLs and functional mobility/transfers.

## 2023-08-31 NOTE — DISCHARGE NOTE NURSING/CASE MANAGEMENT/SOCIAL WORK - NSDCFUADDAPPT_GEN_ALL_CORE_FT
Please follow up with Dr. Kirby, call the office to make/confirm appointment at [phone]    Please follow up with your primary care doctor

## 2023-08-31 NOTE — OCCUPATIONAL THERAPY INITIAL EVALUATION ADULT - ADDITIONAL COMMENTS
Pt. resides with his wife in a home with 2 TAMMI and can reside on the first floor. He reports I in ADLs and use of SC for functional mob/transfers. He is R handed. BR with tub/shower, +grab bars and slightly raised toilet. He wears reading glasses

## 2023-08-31 NOTE — PHYSICAL THERAPY INITIAL EVALUATION ADULT - NEUROVASCULAR ASSESSMENT LLE
warm/no numbness/no tingling/no discoloration Split-Thickness Skin Graft Text: The defect edges were debeveled with a #15 scalpel blade.  Given the location of the defect, shape of the defect and the proximity to free margins a split thickness skin graft was deemed most appropriate.  Using a sterile surgical marker, the primary defect shape was transferred to the donor site. The split thickness graft was then harvested.  The skin graft was then placed in the primary defect and oriented appropriately.

## 2023-08-31 NOTE — PROGRESS NOTE ADULT - SUBJECTIVE AND OBJECTIVE BOX
HPI:  78 y/o male PMHx BPH s/p TURP 2022, CKD III, CLL, HTN, Hypothyroidism, Gout, Parkinson's misdiagnosis now completely weaned off sinemet, s/p cataract surgery bilateral and hemithyroidectomy, NPH s/p R frontal  shunt (Certas @ 8) with Dr. Kirby 7/19/23, right SDH presenting for right MMA embolization.     Patient was initially referred to Dr. Kirby as referral from neurologist Dr. Alvarez for evaluation of NPH with c/o gait imbalance x several years, urinary frequency without incontinence.  Now s/p Right VPS (certas @ 5) 7/19/23. Hospital course uncomplicated, dc to [facility] 7/21/23. Staples removed at rehab.   On 8/10/23 pt had recent onset of mild headaches at night. CTH ordered. He had stopped taking ASA 81mg per his primary care provider >1week ago. CTH ordered which showed decreased ventricular size with new mixed density right subdural hematoma 1.2 cm and right to left midline shift approx 3mm. He was sent to ER where VPS reprogrammed to @ 8. Neuro intact.  On 8/15/23 pt returned via telehealth for f/u with NP. Incision CDI. Headaches have resolved after shunt adjustment to 8. Wife endorses brighter demeanor immediate post op but now less bright again after shunt adjusted to 8. Remains off aspirin. Without any new weakness, other new neuro deficits. Plan made for repeat CTH 1 week.     Repeated CTH showing mixed attenuation SDH decreased in size. No new neurological symptoms. Patient presents today for right MMA embolization. Consideration of changing shunt setting to 7 after MMA embolization (30 Aug 2023 09:30)    INTERVAL EVENTS:  Feels well, denies pain, changes in strength/sensation    HOSPITAL COURSE:  8/30: POD 0 s/p R MMAE  8/31: POD 1. CTH today.      Vital Signs Last 24 Hrs  T(C): 36.7 (30 Aug 2023 21:38), Max: 36.7 (30 Aug 2023 21:38)  T(F): 98 (30 Aug 2023 21:38), Max: 98 (30 Aug 2023 21:38)  HR: 76 (30 Aug 2023 20:17) (56 - 86)  BP: 135/66 (30 Aug 2023 20:17) (135/66 - 201/86)  BP(mean): 94 (30 Aug 2023 20:17) (94 - 125)  RR: 18 (30 Aug 2023 20:17) (16 - 18)  SpO2: 93% (30 Aug 2023 20:17) (93% - 96%)    Parameters below as of 30 Aug 2023 20:17  Patient On (Oxygen Delivery Method): room air        I&O's Summary    30 Aug 2023 07:01  -  31 Aug 2023 00:11  --------------------------------------------------------  IN: 600 mL / OUT: 0 mL / NET: 600 mL        PHYSICAL EXAM:  General: NAD, pt is comfortably sitting up in bed, on room air  HEENT: CN II-XII grossly intact, PERRL 3mm briskly reactive, EOMI b/l, face symmetric, tongue midline, neck FROM  Cardiovascular: RRR, normal S1 and S2   Respiratory: lungs CTAB, no wheezing, rhonchi, or crackles   GI: normoactive BS to auscultation, abd soft, NTND   Neuro: A&Ox3, No aphasia, speech clear, no dysmetria, no pronator drift. Follows commands.  CORDOVA x4 spontaneously, 5/5 strength in all extremities throughout. SILT throughout   Extremities: distal pulses 2+ x4  Wound/incision: R groin site c/d/i, soft, no hematoma    LABS:                        12.9   54.57 )-----------( 141      ( 30 Aug 2023 20:32 )             41.4     08-30    143  |  108  |  22  ----------------------------<  253<H>  4.4   |  22  |  1.64<H>    Ca    8.8      30 Aug 2023 20:32  Phos  3.7     08-30  Mg     2.0     08-30    TPro  6.0  /  Alb  3.7  /  TBili  0.3  /  DBili  x   /  AST  15  /  ALT  10  /  AlkPhos  97  08-30      Urinalysis Basic - ( 30 Aug 2023 20:32 )    Color: x / Appearance: x / SG: x / pH: x  Gluc: 253 mg/dL / Ketone: x  / Bili: x / Urobili: x   Blood: x / Protein: x / Nitrite: x   Leuk Esterase: x / RBC: x / WBC x   Sq Epi: x / Non Sq Epi: x / Bacteria: x          CAPILLARY BLOOD GLUCOSE          Drug Levels: [] N/A    CSF Analysis: [] N/A      Allergies    No Known Allergies    Intolerances      MEDICATIONS:  Antibiotics:    Neuro:  acetaminophen     Tablet .. 650 milliGRAM(s) Oral every 6 hours PRN  melatonin 3 milliGRAM(s) Oral at bedtime    Anticoagulation:    OTHER:  finasteride 5 milliGRAM(s) Oral daily  levothyroxine 88 MICROGram(s) Oral daily  metoprolol succinate  milliGRAM(s) Oral daily  senna 2 Tablet(s) Oral at bedtime  simvastatin 10 milliGRAM(s) Oral at bedtime  valsartan 160 milliGRAM(s) Oral daily    IVF:    CULTURES:    RADIOLOGY & ADDITIONAL TESTS:      ASSESSMENT:  Assessment: 78 y/o male PMHx BPH s/p TURP 2022, CKD III, CLL, HTN, Hypothyroidism, Gout, Parkinson's misdiagnosis now completely weaned off sinemet, s/p cataract surgery bilateral and hemithyroidectomy, NPH s/p R frontal  shunt (Certas @ 8) with Dr. Kirby 7/19/23, right SDH presenting for right MMA embolization (8/30)    Plan:  Neuro  -neuro/vitals q4h  -post op CTH in AM  -PT pending     Cardio  -normotensive SBP goal  -continue home valsartan 160mg qd and Toprol 100mg qd  -continue simvastatin 10mg qd    Pulm  -RA  -ISS    GI  -regular diet  -bowel regimen    Renal  -hx CKD  -IVL  -continue finasterde 5mg    Heme  -hx CLL; baseline leukocytosis  -SCDs for DVT ppx    Endo  -no active issues     ID  -afebrile    admit to tele, full code, d/w Dr. Kirby 
NEUROSURGERY POST OP NOTE:    POD# 0 S/P Right middle meningeal embolization     S: patient evaluated at PACU bedside, conversive, no complaints of pain      T(C): --  HR: --  BP: --  RR: --  SpO2: --        acetaminophen     Tablet .. 650 milliGRAM(s) Oral every 6 hours PRN  chlorhexidine 4% Liquid 1 Application(s) Topical once  finasteride 5 milliGRAM(s) Oral daily  levothyroxine 88 MICROGram(s) Oral daily  melatonin 3 milliGRAM(s) Oral at bedtime  metoprolol succinate  milliGRAM(s) Oral daily  senna 2 Tablet(s) Oral at bedtime  simvastatin 10 milliGRAM(s) Oral at bedtime  sodium chloride 0.9%. 1000 milliLiter(s) IV Continuous <Continuous>  valsartan 160 milliGRAM(s) Oral daily      RADIOLOGY:     Exam:  Constitutional: NAD, well groomed, well nourished  Respiratory: breathing non-labored, symmetrical chest wall movement  Cardiovascuar: RRR, no murmurs  Gastrointestinal: abdomen soft, non tender  Genitourinary: exam deffered  Neurological:  AAOX3. Face symmetric, Verbal function intact, speech clear  Cranial Nerves: II-XII intact  Motor: 5/5 power in b/l upper extremities and lower extremities  Sensation: intact to light touch in all extremities  Pronator Drift: negative  Dysmetria: negative  Extremities: distal pulses 2+ x4  Wound/incision: right groin dressing c/d/i, no evidence of hematoma       Assessment: 76 y/o male PMHx BPH s/p TURP 2022, CKD III, CLL, HTN, Hypothyroidism, Gout, Parkinson's misdiagnosis now completely weaned off sinemet, s/p cataract surgery bilateral and hemithyroidectomy, NPH s/p R frontal  shunt (Certas @ 8) with Dr. Kirby 7/19/23, right SDH presenting for right MMA embolization (8/30)      Plan:  Neuro  -neuro/vitals q4h  -post op CTH in AM  -groin dressing d/c POD 1  -PT pending     Cardio  -normotensive    Pulm  -RA  -ISS    GI  -ADAT  -bowel regimen    Renal  -post op fluids    Heme  -post op labs pending    Endo  -no active issues     ID  -afebrile    admit to tele, full code, d/w Dr. Kirby           Assessment:  Present when checked    []  GCS  E   V  M     Heart Failure: []Acute, [] acute on chronic , []chronic  Heart Failure:  [] Diastolic (HFpEF), [] Systolic (HFrEF), []Combined (HFpEF and HFrEF), [] RHF, [] Pulm HTN, [] Other    [] BRAEDEN, [] ATN, [] AIN, [] other  [] CKD1, [] CKD2, [] CKD 3, [] CKD 4, [] CKD 5, []ESRD    Encephalopathy: [] Metabolic, [] Hepatic, [] toxic, [] Neurological, [] Other    Abnormal Nurtitional Status: [] malnurtition (see nutrition note), [ ]underweight: BMI < 19, [] morbid obesity: BMI >40, [] Cachexia    [] Sepsis  [] hypovolemic shock,[] cardiogenic shock, [] hemorrhagic shock, [] neuogenic shock  [] Acute Respiratory Failure  []Cerebral edema, [] Brain compression/ herniation,   [] Functional quadriplegia  [] Acute blood loss anemia

## 2023-08-31 NOTE — DISCHARGE NOTE PROVIDER - HOSPITAL COURSE
HPI:  76 y/o male PMHx BPH s/p TURP 2022, CKD III, CLL, HTN, Hypothyroidism, Gout, Parkinson's misdiagnosis now completely weaned off sinemet, s/p cataract surgery bilateral and hemithyroidectomy, NPH s/p R frontal  shunt (Certas @ 8) with Dr. Kirby 7/19/23, right SDH presenting for right MMA embolization.     Patient was initially referred to Dr. Kirby as referral from neurologist Dr. Alvarez for evaluation of NPH with c/o gait imbalance x several years, urinary frequency without incontinence.  Now s/p Right VPS (certas @ 5) 7/19/23. Hospital course uncomplicated, dc to [facility] 7/21/23. Staples removed at rehab.   On 8/10/23 pt had recent onset of mild headaches at night. CTH ordered. He had stopped taking ASA 81mg per his primary care provider >1week ago. CTH ordered which showed decreased ventricular size with new mixed density right subdural hematoma 1.2 cm and right to left midline shift approx 3mm. He was sent to ER where VPS reprogrammed to @ 8. Neuro intact.  On 8/15/23 pt returned via telehealth for f/u with NP. Incision CDI. Headaches have resolved after shunt adjustment to 8. Wife endorses brighter demeanor immediate post op but now less bright again after shunt adjusted to 8. Remains off aspirin. Without any new weakness, other new neuro deficits. Plan made for repeat CTH 1 week.     Repeated CTH showing mixed attenuation SDH decreased in size. No new neurological symptoms. Patient presents today for right MMA embolization. Consideration of changing shunt setting to 7 after MMA embolization    Hospital Course:  8/30: POD 0 s/p R MMAE  8/31: POD 1. CTH today.    Patient evaluated by PT/OT who recommended:  Patient is going home? rehab? hospice? Facility Name:     Hospital course uncomplicated    Exam on day of discharge:    Checklist:   - Obtained follow up appointment from NP  - Reviewed final recommendations of inpatient consults  - review discharge planning on provider handoff  - post op imaging completed  - Neurologically stable for discharge  - Vitals stable for discharge   - Afebrile for discharge  - WBC is stable  - Sodium level is normal  - Pain is adequately controlled  - Pt has PICC/walker/brace/collar   - LACE score (10 or > needs PCP apt)   - stroke patient? Discharge NIHSS score   HPI:  78 y/o male PMHx BPH s/p TURP 2022, CKD III, CLL, HTN, Hypothyroidism, Gout, Parkinson's misdiagnosis now completely weaned off sinemet, s/p cataract surgery bilateral and hemithyroidectomy, NPH s/p R frontal  shunt (Certas @ 8) with Dr. Kirby 7/19/23, right SDH presenting for right MMA embolization.     Patient was initially referred to Dr. Kirby as referral from neurologist Dr. Alvarez for evaluation of NPH with c/o gait imbalance x several years, urinary frequency without incontinence.  Now s/p Right VPS (certas @ 5) 7/19/23. Hospital course uncomplicated, dc to [facility] 7/21/23. Staples removed at rehab.   On 8/10/23 pt had recent onset of mild headaches at night. CTH ordered. He had stopped taking ASA 81mg per his primary care provider >1week ago. CTH ordered which showed decreased ventricular size with new mixed density right subdural hematoma 1.2 cm and right to left midline shift approx 3mm. He was sent to ER where VPS reprogrammed to @ 8. Neuro intact.  On 8/15/23 pt returned via telehealth for f/u with NP. Incision CDI. Headaches have resolved after shunt adjustment to 8. Wife endorses brighter demeanor immediate post op but now less bright again after shunt adjusted to 8. Remains off aspirin. Without any new weakness, other new neuro deficits. Plan made for repeat CTH 1 week.     Repeated CTH showing mixed attenuation SDH decreased in size. No new neurological symptoms. Patient presents today for right MMA embolization. Consideration of changing shunt setting to 7 after MMA embolization    Hospital Course:  8/30: POD 0 s/p R MMAE  8/31: POD 1. CTH today.    Patient evaluated by PT/OT who recommended: home with outpatient PT  Patient is going home    Hospital course uncomplicated    Exam on day of discharge:  Neuro: A&Ox3, No aphasia, speech clear, no dysmetria, no pronator drift. Follows commands.  CORDOVA x4 spontaneously, 5/5 strength in all extremities throughout. SILT throughout   Extremities: distal pulses 2+ x4  Wound/incision: R groin site c/d/i, soft, no hematoma    Patient is neuro stable, vitals stable, afebrile, medically ready for discharge     HPI:  78 y/o male PMHx BPH s/p TURP 2022, CKD III, CLL, HTN, Hypothyroidism, Gout, Parkinson's misdiagnosis now completely weaned off sinemet, s/p cataract surgery bilateral and hemithyroidectomy, NPH s/p R frontal  shunt (Certas @ 8) with Dr. Kirby 7/19/23, right SDH presenting for right MMA embolization.     Patient was initially referred to Dr. Kirby as referral from neurologist Dr. Alvarez for evaluation of NPH with c/o gait imbalance x several years, urinary frequency without incontinence.  Now s/p Right VPS (certas @ 5) 7/19/23. Hospital course uncomplicated, dc to [facility] 7/21/23. Staples removed at rehab.   On 8/10/23 pt had recent onset of mild headaches at night. CTH ordered. He had stopped taking ASA 81mg per his primary care provider >1week ago. CTH ordered which showed decreased ventricular size with new mixed density right subdural hematoma 1.2 cm and right to left midline shift approx 3mm. He was sent to ER where VPS reprogrammed to @ 8. Neuro intact.  On 8/15/23 pt returned via telehealth for f/u with NP. Incision CDI. Headaches have resolved after shunt adjustment to 8. Wife endorses brighter demeanor immediate post op but now less bright again after shunt adjusted to 8. Remains off aspirin. Without any new weakness, other new neuro deficits. Plan made for repeat CTH 1 week.     Repeated CTH showing mixed attenuation SDH decreased in size. No new neurological symptoms. Patient presents today for right MMA embolization. Consideration of changing shunt setting to 7 after MMA embolization    Hospital Course:  8/30: POD 0 s/p R MMAE  8/31: POD 1. CTH today. VPS Certas valve adjusted to 7 from 8    Patient evaluated by PT/OT who recommended: home with outpatient PT  Patient is going home    Hospital course uncomplicated    Exam on day of discharge:  Neuro: A&Ox3, No aphasia, speech clear, no dysmetria, no pronator drift. Follows commands.  CORDOVA x4 spontaneously, 5/5 strength in all extremities throughout. SILT throughout   Extremities: distal pulses 2+ x4  Wound/incision: R groin site c/d/i, soft, no hematoma    Patient is neuro stable, vitals stable, afebrile, medically ready for discharge

## 2023-08-31 NOTE — PHYSICAL THERAPY INITIAL EVALUATION ADULT - ADDITIONAL COMMENTS
pt lives w/ his wife in an house w/ 2 steps to enter. pt states that he can stay on the 1st floor. Has been ambulating w/ use of SC

## 2023-08-31 NOTE — DISCHARGE NOTE PROVIDER - NSDCFUADDINST_GEN_ALL_CORE_FT
Neurosurgery follow up appointment date/time:  - please call the office to confirm appointment: [phone]    Wound Care:  - steri strips at groin site will fall off on their own   - you may shower today  - no bathing or swimming for 5 days    Activity:  - Limit your physical activities for 24 hours.  - Do not engage in sports, heavy work or heavy lifting for 72 hours.  - walking is recommended, ambulate as tolerated  - you may shower tomorrow, keep your groin site dry   - no bathing or swimming for 5 days.  - no driving within 24 hours of anesthesia or while taking prescription pain medications   - keep hydrated, drink plenty of water   - do not lift more than 10lbs for 5 days    Diet:  - You may resume your regular diet.  - Drinking extra fluids (water, juice) is encouraged.  - Abstain from alcohol    Please also follow up with your primary care doctor.     Pain Expectations:  - A mild pain at the puncture site is not unusual.  - You may take Tylenol 1-2 tabs every 4-6 hours as needed   - If the pain persists after 24 hours contact the office at [phone]    Medications:  - HOLD home Valsartan for 2 days, can resume 9/2  - Otherwise, continue home meds as prescribed: Finasteride, Synthroid, Metoprolol, Simvastatin, Melatonin as needed  - pain meds: Tylenol (over the counter) as needed for pain   - adverse affects of meds discussed with patient  - pain medications can cause constipation, you should eat a high fiber diet and may take a stool softener while on pain meds     SPECIAL INSTRUCTIONS:  Signs and symptoms to look out for:    1. Georges bleeding from the puncture site is an emergency. Put direct pressure on the site and go directly to your local Emergency Room for treatment.    2. Bleeding under the skin may also occur and a small "black and blue" may be expected. If there appears to be an expanding mass or swelling around the puncture site, apply manual compression and go immediately to your local Emergency Room for treatment.    3. If your foot/leg becomes cool or blue and/or you are unable to move it, this must be treated as an emergency. Go directly to your local Emergency Room for treatment.    4. Excessive puncture site pain is abnormal and should be assessed.    5.  Look out for signs of infection in the puncture site: fever, red streaking of the leg or wrist, drainage, severe pain.    6.  Lack of adequate urine output, provided you are drinking enough fluids, may be cause for concern, notify us if you think this is the case.    Playback:  - See playback health for a copy of your discharge paperwork     WITHIN 24 HOURS OF DISCHARGE, PLEASE CONTACT NEURO PA  WITH ANY QUESTIONS OR CONCERNS: [phone]   OTHERWISE, PLEASE CALL THE OFFICE WITH ANY QUESTIONS OR CONCERNS: [phone] Neurosurgery follow up appointment date/time:  - please call the office to confirm appointment: [phone]    Wound Care:  - steri strips at groin site will fall off on their own   - you may shower today  - no bathing or swimming for 5 days    Activity:  - Limit your physical activities for 24 hours.  - Do not engage in sports, heavy work or heavy lifting for 72 hours.  - walking is recommended, ambulate as tolerated  - you may shower tomorrow, keep your groin site dry   - no bathing or swimming for 5 days.  - no driving within 24 hours of anesthesia or while taking prescription pain medications   - keep hydrated, drink plenty of water   - do not lift more than 10lbs for 5 days    Devices:  - VPS, Certas valve at 7     Diet:  - You may resume your regular diet.  - Drinking extra fluids (water, juice) is encouraged.  - Abstain from alcohol    Please also follow up with your primary care doctor.     Pain Expectations:  - A mild pain at the puncture site is not unusual.  - You may take Tylenol 1-2 tabs every 4-6 hours as needed   - If the pain persists after 24 hours contact the office at [phone]    Medications:  - HOLD home Valsartan for 2 days, can resume 9/2  - Otherwise, continue home meds as prescribed: Finasteride, Synthroid, Metoprolol, Simvastatin, Melatonin as needed  - pain meds: Tylenol (over the counter) as needed for pain   - adverse affects of meds discussed with patient  - pain medications can cause constipation, you should eat a high fiber diet and may take a stool softener while on pain meds     SPECIAL INSTRUCTIONS:  Signs and symptoms to look out for:    1. Georges bleeding from the puncture site is an emergency. Put direct pressure on the site and go directly to your local Emergency Room for treatment.    2. Bleeding under the skin may also occur and a small "black and blue" may be expected. If there appears to be an expanding mass or swelling around the puncture site, apply manual compression and go immediately to your local Emergency Room for treatment.    3. If your foot/leg becomes cool or blue and/or you are unable to move it, this must be treated as an emergency. Go directly to your local Emergency Room for treatment.    4. Excessive puncture site pain is abnormal and should be assessed.    5.  Look out for signs of infection in the puncture site: fever, red streaking of the leg or wrist, drainage, severe pain.    6.  Lack of adequate urine output, provided you are drinking enough fluids, may be cause for concern, notify us if you think this is the case.    Playback:  - See playback health for a copy of your discharge paperwork     WITHIN 24 HOURS OF DISCHARGE, PLEASE CONTACT NEURO PA  WITH ANY QUESTIONS OR CONCERNS: [phone]   OTHERWISE, PLEASE CALL THE OFFICE WITH ANY QUESTIONS OR CONCERNS: [phone]

## 2023-08-31 NOTE — PHYSICAL THERAPY INITIAL EVALUATION ADULT - MODALITIES TREATMENT COMMENTS
smile, cheek puff, eyebrow raise: symmetrical. tongue protrusion: midline. visual tracking (H test): smooth pursuit. visual field test (quadrant test): WNL B/L. No DDK,. finger opposition WNL. R handed

## 2023-08-31 NOTE — DISCHARGE NOTE PROVIDER - NSDCFUSCHEDAPPT_GEN_ALL_CORE_FT
Nahid Alvarez  [facility]  NEUROLOGY [address] [facility]  Scheduled Appointment: 10/26/2023    Ibrahima Hernandez  [facility]  UROLOGY [address] [facility]  Scheduled Appointment: 11/17/2023

## 2023-08-31 NOTE — DISCHARGE NOTE PROVIDER - NSDCMRMEDTOKEN_GEN_ALL_CORE_FT
acetaminophen 325 mg oral tablet: 3 tab(s) orally every 8 hours As needed Mild Pain (1 - 3), Moderate Pain (4 - 6), Severe Pain (7 - 10)  cholecalciferol oral tablet: 1 tab(s) orally once a day 2000 unit(s) orally once a day  finasteride 5 mg oral tablet: 1 tab(s) orally once a day  levothyroxine 88 mcg (0.088 mg) oral tablet: 1 tab(s) orally once a day  melatonin 3 mg oral tablet: 3 tab(s) orally once a day (at bedtime)  metoprolol succinate 100 mg oral tablet, extended release: 1 tab(s) orally once a day  senna leaf extract oral tablet: 2 tab(s) orally once a day (at bedtime)  simvastatin 10 mg oral tablet: 1 tab(s) orally once a day (at bedtime)  valsartan 160 mg oral tablet: 1 tab(s) orally once a day   acetaminophen 325 mg oral tablet: 3 tab(s) orally every 8 hours As needed Mild Pain (1 - 3), Moderate Pain (4 - 6), Severe Pain (7 - 10)  cholecalciferol oral tablet: 1 tab(s) orally once a day 2000 unit(s) orally once a day  finasteride 5 mg oral tablet: 1 tab(s) orally once a day  levothyroxine 88 mcg (0.088 mg) oral tablet: 1 tab(s) orally once a day  melatonin 3 mg oral tablet: 3 tab(s) orally once a day (at bedtime)  metoprolol succinate 100 mg oral tablet, extended release: 1 tab(s) orally once a day  senna leaf extract oral tablet: 2 tab(s) orally once a day (at bedtime)  simvastatin 10 mg oral tablet: 1 tab(s) orally once a day (at bedtime)

## 2023-09-05 ENCOUNTER — OUTPATIENT (OUTPATIENT)
Dept: OUTPATIENT SERVICES | Facility: HOSPITAL | Age: 78
LOS: 1 days | End: 2023-09-05
Payer: MEDICARE

## 2023-09-05 ENCOUNTER — APPOINTMENT (OUTPATIENT)
Dept: CT IMAGING | Facility: HOSPITAL | Age: 78
End: 2023-09-05
Payer: MEDICARE

## 2023-09-05 DIAGNOSIS — Z98.890 OTHER SPECIFIED POSTPROCEDURAL STATES: Chronic | ICD-10-CM

## 2023-09-05 DIAGNOSIS — S06.5XAA TRAUMATIC SUBDURAL HEMORRHAGE WITH LOSS OF CONSCIOUSNESS STATUS UNKNOWN, INITIAL ENCOUNTER: ICD-10-CM

## 2023-09-05 PROCEDURE — 70450 CT HEAD/BRAIN W/O DYE: CPT

## 2023-09-05 PROCEDURE — 70450 CT HEAD/BRAIN W/O DYE: CPT | Mod: 26,MH

## 2023-09-08 ENCOUNTER — APPOINTMENT (OUTPATIENT)
Dept: NEUROSURGERY | Facility: CLINIC | Age: 78
End: 2023-09-08
Payer: MEDICARE

## 2023-09-08 VITALS
HEART RATE: 55 BPM | BODY MASS INDEX: 34.07 KG/M2 | DIASTOLIC BLOOD PRESSURE: 92 MMHG | OXYGEN SATURATION: 97 % | SYSTOLIC BLOOD PRESSURE: 169 MMHG | RESPIRATION RATE: 18 BRPM | WEIGHT: 238 LBS | HEIGHT: 70 IN

## 2023-09-08 DIAGNOSIS — S06.5XAA TRAUMATIC SUBDURAL HEMORRHAGE WITH LOSS OF CONSCIOUSNESS STATUS UNKNOWN, INITIAL ENCOUNTER: ICD-10-CM

## 2023-09-08 PROCEDURE — 99024 POSTOP FOLLOW-UP VISIT: CPT

## 2023-09-08 NOTE — ASSESSMENT
[FreeTextEntry1] : Mr. Gaspar is doing well s/p Embolization of cerebral vessel ( Right MMA). - Certa decreased to 6. - Repeat CT Head in 2 weeks and f/u via tele

## 2023-09-08 NOTE — PHYSICAL EXAM
[General Appearance - Well Nourished] : well nourished [General Appearance - Well-Appearing] : healthy appearing [Oriented To Time, Place, And Person] : oriented to person, place, and time [Person] : oriented to person [Place] : oriented to place [Time] : oriented to time [Cranial Nerves Optic (II)] : visual acuity intact bilaterally,  pupils equal round and reactive to light [Cranial Nerves Oculomotor (III)] : extraocular motion intact [Cranial Nerves Trigeminal (V)] : facial sensation intact symmetrically [Cranial Nerves Facial (VII)] : face symmetrical [Cranial Nerves Vestibulocochlear (VIII)] : hearing was intact bilaterally [Cranial Nerves Accessory (XI - Cranial And Spinal)] : head turning and shoulder shrug symmetric [Cranial Nerves Hypoglossal (XII)] : there was no tongue deviation with protrusion [Motor Tone] : muscle tone was normal in all four extremities [Motor Strength] : muscle strength was normal in all four extremities [FreeTextEntry6] : ambulating with presley

## 2023-09-08 NOTE — REASON FOR VISIT
[de-identified] : Embolization of cerebral vessel ( Right MMA) [de-identified] : 8/31/23 [Spouse] : spouse

## 2023-09-08 NOTE — HISTORY OF PRESENT ILLNESS
[FreeTextEntry1] : 77 year- old, right handed male with PMHX of CLL, hypothyroid, HTN, HLD, gout, BPH s/p prostate ablation Nov 2022, prior misdiagnosis of Parkinsons, now off Sinemet, who presented to Dr. Kirby as referral form neurologist Dr. Alvarez for evaluation of NPH with c/o gait imbalance x several years, urinary frequency without incontinence. Now s/p Right VPS (certas @ 5) 7/19/23.  Hospital course uncomplicated, dc to [facility] 7/21/23 Staples removed at rehab.  8/10/23 pt notified recent onset of mild headaches at night. CTH ordered. He had stopped taking ASA 81mg per his primary care provider >1week ago. CTH ordered which showed decreased ventricular size with new mixed density right subdural hematoma 1.2 cm and right to left midline shift approx 3mm. He was sent to ER where VPS reprogrammed to @ 8. Neuro intact.  8/15/23 pt returned via telehealth for f/u with NP: Incision CDI. Headaches have resolved after shunt adjustment to 8. Wife endorses brighter demeanor immediate post op but now less bright again after shunt adjusted to 8. Remains off aspirin. Without any new weakness, other new neuro deficits. Plan made for repeat CTH 1 week.  8/24/23 pt returns for follow- up. Repeated CTH yesterday with report of: previously mixed attenuation sdh decreased in size. No new neurological symptoms.  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 shunt setting decreased to 6. Denies HA and reports vision, walking, urination is improved and stable

## 2023-09-12 NOTE — CHART NOTE - NSCHARTNOTEFT_GEN_A_CORE
a small perforation of MMA artery during manipulation, which is an Intraoperative complication due to the procedure, that did not require additional treatment. Coil embolized successfully without any sequela.

## 2023-09-22 ENCOUNTER — APPOINTMENT (OUTPATIENT)
Dept: CT IMAGING | Facility: HOSPITAL | Age: 78
End: 2023-09-22
Payer: MEDICARE

## 2023-09-22 ENCOUNTER — APPOINTMENT (OUTPATIENT)
Dept: CT IMAGING | Facility: CLINIC | Age: 78
End: 2023-09-22

## 2023-09-22 ENCOUNTER — OUTPATIENT (OUTPATIENT)
Dept: OUTPATIENT SERVICES | Facility: HOSPITAL | Age: 78
LOS: 1 days | End: 2023-09-22
Payer: MEDICARE

## 2023-09-22 DIAGNOSIS — Z98.890 OTHER SPECIFIED POSTPROCEDURAL STATES: Chronic | ICD-10-CM

## 2023-09-22 DIAGNOSIS — G91.9 HYDROCEPHALUS, UNSPECIFIED: ICD-10-CM

## 2023-09-22 DIAGNOSIS — Z98.52 VASECTOMY STATUS: Chronic | ICD-10-CM

## 2023-09-22 DIAGNOSIS — E89.0 POSTPROCEDURAL HYPOTHYROIDISM: Chronic | ICD-10-CM

## 2023-09-22 PROCEDURE — 70450 CT HEAD/BRAIN W/O DYE: CPT | Mod: 26,MH

## 2023-09-22 PROCEDURE — 70450 CT HEAD/BRAIN W/O DYE: CPT

## 2023-09-26 PROCEDURE — 80048 BASIC METABOLIC PNL TOTAL CA: CPT

## 2023-09-26 PROCEDURE — 92507 TX SP LANG VOICE COMM INDIV: CPT

## 2023-09-26 PROCEDURE — 85025 COMPLETE CBC W/AUTO DIFF WBC: CPT

## 2023-09-26 PROCEDURE — 36415 COLL VENOUS BLD VENIPUNCTURE: CPT

## 2023-09-26 PROCEDURE — 97535 SELF CARE MNGMENT TRAINING: CPT

## 2023-09-26 PROCEDURE — 97530 THERAPEUTIC ACTIVITIES: CPT

## 2023-09-26 PROCEDURE — 97110 THERAPEUTIC EXERCISES: CPT

## 2023-09-26 PROCEDURE — 80053 COMPREHEN METABOLIC PANEL: CPT

## 2023-09-26 PROCEDURE — 97167 OT EVAL HIGH COMPLEX 60 MIN: CPT

## 2023-09-26 PROCEDURE — 87635 SARS-COV-2 COVID-19 AMP PRB: CPT

## 2023-09-26 PROCEDURE — 97112 NEUROMUSCULAR REEDUCATION: CPT

## 2023-09-26 PROCEDURE — 85027 COMPLETE CBC AUTOMATED: CPT

## 2023-09-26 PROCEDURE — 92610 EVALUATE SWALLOWING FUNCTION: CPT

## 2023-09-26 PROCEDURE — 92523 SPEECH SOUND LANG COMPREHEN: CPT

## 2023-09-26 PROCEDURE — 97163 PT EVAL HIGH COMPLEX 45 MIN: CPT

## 2023-09-26 PROCEDURE — 97116 GAIT TRAINING THERAPY: CPT

## 2023-10-04 ENCOUNTER — NON-APPOINTMENT (OUTPATIENT)
Age: 78
End: 2023-10-04

## 2023-10-10 ENCOUNTER — APPOINTMENT (OUTPATIENT)
Dept: NEUROSURGERY | Facility: CLINIC | Age: 78
End: 2023-10-10
Payer: MEDICARE

## 2023-10-10 DIAGNOSIS — G91.2 (IDIOPATHIC) NORMAL PRESSURE HYDROCEPHALUS: ICD-10-CM

## 2023-10-10 DIAGNOSIS — R41.3 OTHER AMNESIA: ICD-10-CM

## 2023-10-10 DIAGNOSIS — G91.9 HYDROCEPHALUS, UNSPECIFIED: ICD-10-CM

## 2023-10-10 PROCEDURE — 99212 OFFICE O/P EST SF 10 MIN: CPT | Mod: 24,95

## 2023-10-13 ENCOUNTER — OUTPATIENT (OUTPATIENT)
Dept: OUTPATIENT SERVICES | Facility: HOSPITAL | Age: 78
LOS: 1 days | End: 2023-10-13
Payer: MEDICARE

## 2023-10-13 ENCOUNTER — APPOINTMENT (OUTPATIENT)
Dept: CT IMAGING | Facility: HOSPITAL | Age: 78
End: 2023-10-13
Payer: MEDICARE

## 2023-10-13 DIAGNOSIS — Z98.890 OTHER SPECIFIED POSTPROCEDURAL STATES: Chronic | ICD-10-CM

## 2023-10-13 DIAGNOSIS — G91.9 HYDROCEPHALUS, UNSPECIFIED: ICD-10-CM

## 2023-10-13 DIAGNOSIS — E89.0 POSTPROCEDURAL HYPOTHYROIDISM: Chronic | ICD-10-CM

## 2023-10-13 DIAGNOSIS — Z98.52 VASECTOMY STATUS: Chronic | ICD-10-CM

## 2023-10-13 PROCEDURE — 70450 CT HEAD/BRAIN W/O DYE: CPT

## 2023-10-13 PROCEDURE — 70450 CT HEAD/BRAIN W/O DYE: CPT | Mod: 26,MH

## 2023-10-17 ENCOUNTER — OUTPATIENT (OUTPATIENT)
Dept: OUTPATIENT SERVICES | Facility: HOSPITAL | Age: 78
LOS: 1 days | End: 2023-10-17
Payer: MEDICARE

## 2023-10-17 ENCOUNTER — APPOINTMENT (OUTPATIENT)
Dept: CT IMAGING | Facility: CLINIC | Age: 78
End: 2023-10-17
Payer: MEDICARE

## 2023-10-17 DIAGNOSIS — R07.89 OTHER CHEST PAIN: ICD-10-CM

## 2023-10-17 DIAGNOSIS — Z98.890 OTHER SPECIFIED POSTPROCEDURAL STATES: Chronic | ICD-10-CM

## 2023-10-17 DIAGNOSIS — R06.02 SHORTNESS OF BREATH: ICD-10-CM

## 2023-10-17 DIAGNOSIS — Z98.52 VASECTOMY STATUS: Chronic | ICD-10-CM

## 2023-10-17 DIAGNOSIS — E89.0 POSTPROCEDURAL HYPOTHYROIDISM: Chronic | ICD-10-CM

## 2023-10-17 PROCEDURE — G1004: CPT

## 2023-10-17 PROCEDURE — 75574 CT ANGIO HRT W/3D IMAGE: CPT | Mod: ME

## 2023-10-17 PROCEDURE — 75574 CT ANGIO HRT W/3D IMAGE: CPT | Mod: 26,ME

## 2023-10-25 ENCOUNTER — NON-APPOINTMENT (OUTPATIENT)
Age: 78
End: 2023-10-25

## 2023-10-26 ENCOUNTER — APPOINTMENT (OUTPATIENT)
Dept: NEUROLOGY | Facility: CLINIC | Age: 78
End: 2023-10-26
Payer: MEDICARE

## 2023-10-26 VITALS
SYSTOLIC BLOOD PRESSURE: 163 MMHG | BODY MASS INDEX: 32.93 KG/M2 | HEART RATE: 73 BPM | DIASTOLIC BLOOD PRESSURE: 90 MMHG | HEIGHT: 70 IN | WEIGHT: 230 LBS

## 2023-10-26 VITALS — DIASTOLIC BLOOD PRESSURE: 92 MMHG | HEART RATE: 76 BPM | SYSTOLIC BLOOD PRESSURE: 165 MMHG

## 2023-10-26 PROCEDURE — 99215 OFFICE O/P EST HI 40 MIN: CPT

## 2023-10-26 PROCEDURE — ZZZZZ: CPT

## 2023-10-27 ENCOUNTER — APPOINTMENT (OUTPATIENT)
Dept: UROLOGY | Facility: CLINIC | Age: 78
End: 2023-10-27
Payer: MEDICARE

## 2023-10-27 DIAGNOSIS — N13.8 BENIGN PROSTATIC HYPERPLASIA WITH LOWER URINARY TRACT SYMPMS: ICD-10-CM

## 2023-10-27 DIAGNOSIS — R35.0 FREQUENCY OF MICTURITION: ICD-10-CM

## 2023-10-27 DIAGNOSIS — N40.1 BENIGN PROSTATIC HYPERPLASIA WITH LOWER URINARY TRACT SYMPMS: ICD-10-CM

## 2023-10-27 PROCEDURE — 99214 OFFICE O/P EST MOD 30 MIN: CPT

## 2023-10-27 PROCEDURE — 51798 US URINE CAPACITY MEASURE: CPT

## 2023-10-30 NOTE — PRE-ANESTHESIA EVALUATION ADULT - WEIGHT IN KG
Received a prior authorization request from [employer] for tacrolimus 0.1% ointment. Form completed and faxed to [employer] fax#[phone]. Phone #[phone]. Waiting for response.    109

## 2023-11-13 ENCOUNTER — TRANSCRIPTION ENCOUNTER (OUTPATIENT)
Age: 78
End: 2023-11-13

## 2023-11-15 ENCOUNTER — APPOINTMENT (OUTPATIENT)
Dept: NEUROSURGERY | Facility: CLINIC | Age: 78
End: 2023-11-15
Payer: MEDICARE

## 2023-11-15 VITALS
RESPIRATION RATE: 18 BRPM | DIASTOLIC BLOOD PRESSURE: 84 MMHG | WEIGHT: 230 LBS | BODY MASS INDEX: 32.93 KG/M2 | OXYGEN SATURATION: 98 % | SYSTOLIC BLOOD PRESSURE: 169 MMHG | HEART RATE: 55 BPM | HEIGHT: 70 IN

## 2023-11-15 DIAGNOSIS — G93.89 OTHER SPECIFIED DISORDERS OF BRAIN: ICD-10-CM

## 2023-11-15 PROCEDURE — 99212 OFFICE O/P EST SF 10 MIN: CPT

## 2023-11-28 ENCOUNTER — APPOINTMENT (OUTPATIENT)
Dept: FAMILY MEDICINE | Facility: CLINIC | Age: 78
End: 2023-11-28
Payer: MEDICARE

## 2023-11-28 PROCEDURE — 99443: CPT | Mod: 95

## 2023-12-13 NOTE — OCCUPATIONAL THERAPY INITIAL EVALUATION ADULT - NS ASR FOLLOW COMMAND OT EVAL
[FreeTextEntry1] : 57 y/o F w/ Hx of paraproteinemia, HTN, obesity here for initial evaluation and management of weight issuesgenerally feels well and endorses no acute complaints. reports lifelong issues w/ weight and hx of childhood obesity. max weight is now. reports she eats 2 meals a day, usually skips brk, largest meal is dinner ~7 PM. eats more during the weekend. usually cooks for herself during the week. preferred starches are bread and rice. admits to soda consumption. past VSG in 2016, reports failure 2/2 appetite control issues. denies steroid/AA use in the past. no past weight loss interventions. physically active. does not carb/calorie count. She otherwise denies any f/c, CP, SOB, palpitations, tremors, depressed mood, anxiety, palpitations, n/v, stool/urinary abn, skin/weight changes, heat/cold intolerance, HAs, breast/nipple changes, polyuria/polydipsia/nocturia or other complaints. 100% of the time/able to follow single-step instructions

## 2023-12-19 ENCOUNTER — APPOINTMENT (OUTPATIENT)
Dept: FAMILY MEDICINE | Facility: CLINIC | Age: 78
End: 2023-12-19
Payer: MEDICARE

## 2023-12-19 VITALS
SYSTOLIC BLOOD PRESSURE: 181 MMHG | TEMPERATURE: 98.1 F | DIASTOLIC BLOOD PRESSURE: 90 MMHG | OXYGEN SATURATION: 95 % | WEIGHT: 230 LBS | HEART RATE: 62 BPM | BODY MASS INDEX: 32.93 KG/M2 | HEIGHT: 70 IN | RESPIRATION RATE: 16 BRPM

## 2023-12-19 DIAGNOSIS — J06.9 ACUTE UPPER RESPIRATORY INFECTION, UNSPECIFIED: ICD-10-CM

## 2023-12-19 DIAGNOSIS — R05.9 COUGH, UNSPECIFIED: ICD-10-CM

## 2023-12-19 PROCEDURE — 99213 OFFICE O/P EST LOW 20 MIN: CPT

## 2023-12-19 NOTE — HISTORY OF PRESENT ILLNESS
[FreeTextEntry8] : 79 y/o M  s/p  shunt, HTN, BPH presents for cough for 2 weeks. Dry cough,. Went to  given Amoxicillin  day 4. with some improvement.  Admits to HA.  . temp or body aches,. Unsure tested for Covid at .

## 2023-12-19 NOTE — REVIEW OF SYSTEMS
[Fever] : no fever [Chills] : no chills [Fatigue] : fatigue [Postnasal Drip] : postnasal drip [Nasal Discharge] : nasal discharge [Cough] : cough [Headache] : headache [Swollen Glands] : no swollen glands [Negative] : Cardiovascular

## 2023-12-21 ENCOUNTER — NON-APPOINTMENT (OUTPATIENT)
Age: 78
End: 2023-12-21

## 2023-12-21 DIAGNOSIS — R26.89 OTHER ABNORMALITIES OF GAIT AND MOBILITY: ICD-10-CM

## 2024-01-30 LAB
INFLUENZA A RESULT: NOT DETECTED
INFLUENZA B RESULT: NOT DETECTED
RESP SYN VIRUS RESULT: NOT DETECTED
SARS-COV-2 RESULT: NOT DETECTED

## 2024-02-06 ENCOUNTER — APPOINTMENT (OUTPATIENT)
Dept: NEPHROLOGY | Facility: CLINIC | Age: 79
End: 2024-02-06
Payer: MEDICARE

## 2024-02-06 VITALS — SYSTOLIC BLOOD PRESSURE: 120 MMHG | DIASTOLIC BLOOD PRESSURE: 68 MMHG

## 2024-02-06 VITALS
SYSTOLIC BLOOD PRESSURE: 129 MMHG | HEART RATE: 93 BPM | WEIGHT: 246.91 LBS | DIASTOLIC BLOOD PRESSURE: 84 MMHG | OXYGEN SATURATION: 94 % | HEIGHT: 70 IN | TEMPERATURE: 97.8 F | BODY MASS INDEX: 35.35 KG/M2

## 2024-02-06 DIAGNOSIS — N40.1 BENIGN PROSTATIC HYPERPLASIA WITH LOWER URINARY TRACT SYMPMS: ICD-10-CM

## 2024-02-06 DIAGNOSIS — R35.1 BENIGN PROSTATIC HYPERPLASIA WITH LOWER URINARY TRACT SYMPMS: ICD-10-CM

## 2024-02-06 DIAGNOSIS — E79.0 HYPERURICEMIA W/OUT SIGNS OF INFLAMMATORY ARTHRITIS AND TOPHACEOUS DISEASE: ICD-10-CM

## 2024-02-06 DIAGNOSIS — I10 ESSENTIAL (PRIMARY) HYPERTENSION: ICD-10-CM

## 2024-02-06 DIAGNOSIS — R80.9 PROTEINURIA, UNSPECIFIED: ICD-10-CM

## 2024-02-06 PROCEDURE — G2211 COMPLEX E/M VISIT ADD ON: CPT

## 2024-02-06 PROCEDURE — 99213 OFFICE O/P EST LOW 20 MIN: CPT

## 2024-02-06 RX ORDER — BENZONATATE 200 MG/1
200 CAPSULE ORAL
Qty: 20 | Refills: 1 | Status: DISCONTINUED | COMMUNITY
Start: 2023-12-19 | End: 2024-02-06

## 2024-02-06 NOTE — ASSESSMENT
[FreeTextEntry1] : 77 yo male with sub-nephrotic proteinuria, HTN, CLL, CKD3b s/p  shunt. CKD 3b: Likely due to hyperfiltration due to obesity.  Creatinine trend reviewed with patient, repeating renal panel today.  HTN: BP at goal.  Proteinuria:  send u/a, microalbumin/creatinine ratio. Continue Valsartan for antiproteinuric effect. Monitor and trend proteinuria.  Modest protein intake discussed and no more than 75 grams of daily protein intake suggested.  Following urology for BPH Hydrocephalus s/p  shunt: Follow with neuro The patient was educated on the importance of good blood pressure and to avoid NSAIDs.  Lifestyle modifications encouraged including low Na diet.  Weight loss advised. All questions were answered. Follow-up in 6 months with NP

## 2024-02-06 NOTE — PHYSICAL EXAM
[General Appearance - Alert] : alert [General Appearance - In No Acute Distress] : in no acute distress [Sclera] : the sclera and conjunctiva were normal [Outer Ear] : the ears and nose were normal in appearance [Neck Appearance] : the appearance of the neck was normal [Neck Cervical Mass (___cm)] : no neck mass was observed [Jugular Venous Distention Increased] : there was no jugular-venous distention [Auscultation Breath Sounds / Voice Sounds] : lungs were clear to auscultation bilaterally [Heart Rate And Rhythm] : heart rate was normal and rhythm regular [Heart Sounds] : normal S1 and S2 [Heart Sounds Gallop] : no gallops [Murmurs] : no murmurs [Heart Sounds Pericardial Friction Rub] : no pericardial rub [Edema] : there was no peripheral edema [Bowel Sounds] : normal bowel sounds [Abdomen Soft] : soft [No CVA Tenderness] : no ~M costovertebral angle tenderness [Involuntary Movements] : no involuntary movements were seen [] : no rash [No Focal Deficits] : no focal deficits [Oriented To Time, Place, And Person] : oriented to person, place, and time [Impaired Insight] : insight and judgment were intact [Affect] : the affect was normal [FreeTextEntry1] : uses a cane

## 2024-02-06 NOTE — REVIEW OF SYSTEMS
[As Noted in HPI] : as noted in HPI [Difficulty Walking] : difficulty walking [Negative] : Endocrine [Recent Weight Gain (___ Lbs)] : recent [unfilled] ~Ulb weight gain [Confused] : no confusion [de-identified] : follows with Dr. Sparks for CLL

## 2024-02-06 NOTE — HISTORY OF PRESENT ILLNESS
[FreeTextEntry1] : CHA GONZALEZ is a 78-year-old male with a history HTN and CKD 3b here for follow up.  Having some headaches s/p  shunt but overall doing well. Follows up with neurologist Dr. Alvarez regularly and surgeon.  He follows with Dr. Mu Sparks for CLL. Labs done in October scanned into chart.  Has gained weight since December.  Denies swelling, SOB, chest pain. Has not been exercising as much as he'd like.

## 2024-02-07 LAB
25(OH)D3 SERPL-MCNC: 36.1 NG/ML
ALBUMIN SERPL ELPH-MCNC: 4.7 G/DL
ANION GAP SERPL CALC-SCNC: 17 MMOL/L
BUN SERPL-MCNC: 23 MG/DL
CALCIUM SERPL-MCNC: 9.3 MG/DL
CALCIUM SERPL-MCNC: 9.3 MG/DL
CHLORIDE SERPL-SCNC: 110 MMOL/L
CO2 SERPL-SCNC: 20 MMOL/L
CREAT SERPL-MCNC: 1.58 MG/DL
EGFR: 45 ML/MIN/1.73M2
ESTIMATED AVERAGE GLUCOSE: 114 MG/DL
GLUCOSE SERPL-MCNC: 146 MG/DL
HBA1C MFR BLD HPLC: 5.6 %
HCT VFR BLD CALC: 42.5 %
HGB BLD-MCNC: 13.6 G/DL
MCHC RBC-ENTMCNC: 32 GM/DL
MCHC RBC-ENTMCNC: 33.3 PG
MCV RBC AUTO: 104.2 FL
PARATHYROID HORMONE INTACT: 150 PG/ML
PHOSPHATE SERPL-MCNC: 3.1 MG/DL
PLATELET # BLD AUTO: 176 K/UL
POTASSIUM SERPL-SCNC: 5.1 MMOL/L
RBC # BLD: 4.08 M/UL
RBC # FLD: 13.9 %
SODIUM SERPL-SCNC: 147 MMOL/L
URATE SERPL-MCNC: 8.4 MG/DL
WBC # FLD AUTO: 49.35 K/UL

## 2024-02-08 LAB
APPEARANCE: CLEAR
BACTERIA: NEGATIVE /HPF
BILIRUBIN URINE: NEGATIVE
BLOOD URINE: NEGATIVE
CAST: 0 /LPF
COLOR: YELLOW
CREAT SPEC-SCNC: 65 MG/DL
EPITHELIAL CELLS: 3 /HPF
GLUCOSE QUALITATIVE U: NEGATIVE MG/DL
KETONES URINE: NEGATIVE MG/DL
LEUKOCYTE ESTERASE URINE: NEGATIVE
MICROALBUMIN 24H UR DL<=1MG/L-MCNC: 35.3 MG/DL
MICROALBUMIN/CREAT 24H UR-RTO: 545 MG/G
MICROSCOPIC-UA: NORMAL
NITRITE URINE: NEGATIVE
PH URINE: 6.5
PROTEIN URINE: 100 MG/DL
RED BLOOD CELLS URINE: 2 /HPF
SPECIFIC GRAVITY URINE: 1.01
UROBILINOGEN URINE: 0.2 MG/DL
WHITE BLOOD CELLS URINE: 1 /HPF

## 2024-03-03 NOTE — H&P PST ADULT - DOES THIS PATIENT HAVE A HISTORY OF OR HAS BEEN DX WITH HEART FAILURE?
[facility]  Nephrology  Progress Note    Patient Name: Sarah Saravia  MRN: [id number]  Admission Date: 1/29/2024  Hospital Length of Stay: 34 days  Attending Provider: Steve Cole MD   Primary Care Physician: [facility]  Principal Problem:Ayaz's gangrene    Subjective:     HPI: Sarah Saravia is a 54 year old female with a past medical history of obesity (BMI 53) admitted with N/V and R groin wound found to be in DKA at OSH.  She underwent a CT abdomen and pelvis that showed extensive soft tissue air throughout the right groin and inguinal region and extending to involve the right lower quadrant anterior abdominal wall with extensive soft tissue air also seen involving the proximal medial aspect of the right thigh, concerning for gas-forming infection. She is s/p OR debridement for necrotizing fascitis on 1/29/24 and take back for 1/31/24. Right groin tissue growing proteus, susceptibilities still pending. Currently on meropenem and clindamycin which was d/c'd on 1/31/24. While at OSH pt developed acute respiratory failure requiring intubation. Nephrology was consulted for worsening alvarado in the setting of lactic acidosis and septic shock likely ATN, sCr elevated at 3.8 on admit.  OR today for debridement with possible closure and wound vac placement. Last HD 2/1. Net -1.3L. Electrolytes stable. Nephrology consulted for ALVARADO.     Interval History: creatinine finally improving    Review of patient's allergies indicates:  No Known Allergies  Current Facility-Administered Medications   Medication Frequency    0.9%  NaCl infusion PRN    0.9%  NaCl infusion Once    acetaminophen tablet 650 mg Q4H PRN    albuterol-ipratropium 2.5 mg-0.5 mg/3 mL nebulizer solution 3 mL Q4H PRN    amLODIPine tablet 10 mg Daily    carvediloL tablet 25 mg BID    dextrose 10% bolus 125 mL 125 mL PRN    dextrose 10% bolus 250 mL 250 mL PRN    glucagon (human recombinant) injection 1 mg PRN    glucose  chewable tablet 16 g PRN    glucose chewable tablet 24 g PRN    heparin (porcine) injection 7,500 Units Q8H    hydrALAZINE tablet 25 mg Q8H PRN    insulin aspart U-100 pen 0-10 Units Q4H PRN    insulin aspart U-100 pen 7 Units Q4H    insulin detemir U-100 (Levemir) pen 14 Units Daily    labetalol 20 mg/4 mL (5 mg/mL) IV syring Q6H PRN    naloxone 0.4 mg/mL injection 0.02 mg PRN    ondansetron injection 4 mg Q6H PRN    prochlorperazine injection Soln 5 mg Q6H PRN    psyllium husk (aspartame) 3.4 gram packet 1 packet Daily    sevelamer carbonate pwpk 1.6 g TID    sodium chloride 0.9% bolus 250 mL 250 mL PRN    sodium chloride 0.9% flush 10 mL PRN       Objective:     Vital Signs (Most Recent):  Temp: 98.6 °F (37 °C) (03/03/24 0851)  Pulse: 93 (03/03/24 0851)  Resp: 18 (03/03/24 0851)  BP: (!) 151/83 (03/03/24 0851)  SpO2: 99 % (03/03/24 0851) Vital Signs (24h Range):  Temp:  [98 °F (36.7 °C)-99.1 °F (37.3 °C)] 98.6 °F (37 °C)  Pulse:  [83-96] 93  Resp:  [16-18] 18  SpO2:  [94 %-99 %] 99 %  BP: (132-175)/(67-83) 151/83     Weight: (!) 140.2 kg (309 lb) (02/29/24 0300)  Body mass index is 51.42 kg/m².  Body surface area is 2.54 meters squared.    I/O last 3 completed shifts:  In: 1178 [NG/GT:1178]  Out: 2312 [Urine:2100; Other:10; Stool:202]    Physical Exam  Vitals and nursing note reviewed.   Constitutional:       General: She is not in acute distress.     Appearance: She is ill-appearing.   HENT:      Head: Normocephalic and atraumatic.   Eyes:      Comments: Not opening eyes to command    Neck:      Comments: Left IJ Trialysis catheter  Cardiovascular:      Rate and Rhythm: Normal rate and regular rhythm.   Pulmonary:      Effort: Pulmonary effort is normal.      Comments: trached    Abdominal:      General: There is no distension.      Palpations: Abdomen is soft.      Tenderness: There is no abdominal tenderness.      Comments: PEG in place   Genitourinary:     Comments: Glynn in place    Skin:     General: Skin  is warm and dry.      Comments: Wound vac in place to R thigh/groin to suction  Re approximated skin clean dry and intact   Neurological:      Comments: Occasionally withdraws from painful stimuli. No significant purposeful activity           Significant Labs:  All labs within the past 24 hours have been reviewed.     Significant Imaging:  Labs: Reviewed  Assessment/Plan:     Renal/  ALVARADO (acute kidney injury)  Transfer from [facility]. Admitted for fourniers gangrene. Initiated HD on 1/31. ALVARADO 2/2 ATN in setting of septic shock. Arrived with CR 3.8. Unknown baseline. S/P OR debridement with possible closure and wound vac placement     ALVARADO most likley ATN in setting of septic shock; ATN recovering.   UOP:     Intake/Output Summary (Last 24 hours) at 3/3/2024 1010  Last data filed at 3/3/2024 0612  Gross per 24 hour   Intake 750 ml   Output 1625 ml   Net -875 ml       3/3/2024: serum creatinine improved form 3.9 to 3.3    Plan/Recommendation  - No urgent need for RRT, will evaluate need on daily basis.   - Monitor for post ATN diuresis  - Would HOLD on perm cath placement for now as patient with some signs of renal recovery  - need RFP daily to watch for renal recovery  - Please dose all ABX in accordance to RRT schedule (zosyn, etc)  -Daily RFP   -Strict I&Os  -Avoid nephrotoxins, if possible         Thank you for your consult. I will follow-up with patient. Please contact us if you have any additional questions.    Enrique Stokes MD  Nephrology  [facility]   no

## 2024-04-03 ENCOUNTER — APPOINTMENT (OUTPATIENT)
Dept: CT IMAGING | Facility: HOSPITAL | Age: 79
End: 2024-04-03
Payer: MEDICARE

## 2024-04-03 ENCOUNTER — OUTPATIENT (OUTPATIENT)
Dept: OUTPATIENT SERVICES | Facility: HOSPITAL | Age: 79
LOS: 1 days | End: 2024-04-03
Payer: MEDICARE

## 2024-04-03 DIAGNOSIS — G91.9 HYDROCEPHALUS, UNSPECIFIED: ICD-10-CM

## 2024-04-03 DIAGNOSIS — Z98.890 OTHER SPECIFIED POSTPROCEDURAL STATES: Chronic | ICD-10-CM

## 2024-04-03 DIAGNOSIS — G93.89 OTHER SPECIFIED DISORDERS OF BRAIN: ICD-10-CM

## 2024-04-03 DIAGNOSIS — Z98.52 VASECTOMY STATUS: Chronic | ICD-10-CM

## 2024-04-03 DIAGNOSIS — Z98.2 PRESENCE OF CEREBROSPINAL FLUID DRAINAGE DEVICE: ICD-10-CM

## 2024-04-03 DIAGNOSIS — E89.0 POSTPROCEDURAL HYPOTHYROIDISM: Chronic | ICD-10-CM

## 2024-04-03 PROCEDURE — 70450 CT HEAD/BRAIN W/O DYE: CPT

## 2024-04-03 PROCEDURE — 70450 CT HEAD/BRAIN W/O DYE: CPT | Mod: 26,MH

## 2024-04-04 NOTE — ED PROVIDER NOTE - IV ALTEPLASE INCLUSION HIDDEN
[facility]  Neurological Surgery Established Patient Clinic Note    Miranda Oliveros  8/16/1956  [id number]  PCP: Jhonathan Neal MD  Referring Provider: Bernardo Clark DO     REASON FOR VISIT:  C1 burst fracture    HISTORY OF PRESENT ILLNESS:  Miranda Oliveros is a(n) 67 year old female with HTN, HLD, GERD who was admitted s/p fall. History obtained from spouse at bedside as patient has no recollection of events. Per , the morning of 7/21 around 4 am he heard a noise and found her at the bottom of the stairs with blood all around her. She has been disoriented and complaining of neck pain since the fall. She has significant left sided periorbital edema. Trauma imaging was negative for intracranial injuries but did reveal a tiny non-displaced partial fracture of the left posterior arch of C1. Neurosurgery was consulted.       INTERVAL HISTORY 8/1/2023:   Ms. Oliveros presents for close follow-up of her C1 fracture. As documented in a separate TE note, I was contacted by radiology regarding additional findings on her CT of the cervical spine.  Therefore, she was brought in for close follow-up.  Today, she reports continued improvement in her condition.  Her bruising over her face is still present but much improved.  She continues to have a pretty sizable left subgaleal hematoma.  As far as her neck is concerned, she reports today by day improvement of her neck pain.  She is not necessitating c-collar, not even for comfort.  Her pain is controlled with Tylenol and anti-inflammatories.  She does feel some tension when rotating her neck in certain directions, but this is nonpainful more so than slightly restrictive.  She denies any paresthesias, balance difficulties, decreased dexterity, or any other red flags for myelopathy.     INTERVAL HISTORY 8/17/2023:   Ms. Oliveros presents for continued close follow-up of her C1 fracture.  Today, she is happy to report continued  improvement in her pain.  As of last week she was still able to feel little little whenever it was humid outside.  The last 2 days this is felt much better.  She had x-rays done today prior to her visit, this was reportedly stable.  She is here to review and discuss the results.     INTERVAL HISTORY 8/28/2023:   Ms. Oliveros presents for short interval reevaluation of her C1 fracture.  She was last seen on 8/17/2023. Unfortunately, shortly after leaving the clinic she went to her daughter's house and tripped and fell in the driveway.  She ended up hitting her head against concrete on the floor.  She did not have any significant increase in neck pain or other symptoms otherwise.  However, she was convinced to present to the emergency room for evaluation to rule out any intracranial pathology.  CT of the brain was negative for acute pathology.  However, CT of the cervical spine demonstrated worsening widening of her fracture fragments and significantly more overhang on the right C1-C2 joint concerning for ligamentous injury.  An MRI was obtained while in the hospital for this reason, which failed to demonstrate rosanne ligamentous injury.  No spinal cord compression.  Today, she reports 100% compliance with her cervical collar.  She does have some pressure pain along the posterior aspect of her head after a long day of wearing the c-collar.  Otherwise she denies significant neck pain.  She denies any neurologic symptoms.     INTERVAL HISTORY 9/26/2023:   Ms. Oliveros presents for close follow-up today.  It has been approximately 1 month since her second fall that resulted in distraction of her fracture.  She reports no neck pain.  No neurologic symptoms.  Everything is stable.  She is 100% compliant with her brace.  Most of the bruising over her face and scalp are mostly resolved.  She is planning a trip soon, and had some questions related to it.  She is also doing physical therapy and has been helping with her left  shoulder.  She had x-rays on the  and she is here to discuss the results.     INTERVAL HISTORY 2023:  Ms. Oliveros returns to clinic today for continued neurosurgical follow-up of her C1 burst fracture.  She had a CT of the cervical spine on 2023 and is here to discuss results.  She reports no neck pain.  She continues to be compliant with the c-collar.  She continues to deny any signs or symptoms concerning for cervical spinal cord compression.     INTERVAL HISTORY 2/15/2024:  Miranda Oliveros returns to clinic today for follow-up regarding her C1 fracture.  She recently had a CT of the cervical spine and is here to review those results.  She has been wearing cervical collar for at all times for the past 6 months.  She has no cervical pain but when there is a change in weather (rainy or cold) there is an arthritic type pain.  She continues to deny any neurologic changes.  She remains neurologically stable.    INTERVAL HISTORY 2024:  Miranda Oliveros returns to clinic today for preoperative discussions.  She is scheduled to undergo posterior cervical fixation and fusion on 2024.  She recently had an MRI of the cervical spine on 3/21/2024 and is here to discuss those results.  I answered all her and her 's questions.    PAST MEDICAL HISTORY:  Past Medical History:   Diagnosis Date    Allergic rhinitis due to pollen     Carcinoma in situ of cervix uteri 1986    Cataract     Esophageal reflux     High blood pressure     High cholesterol     Hyperlipidemia     Menopause 2006    Osteoarthritis     generalized    Pure hypercholesterolemia     Rosacea     Tubular adenoma of colon 2012    Visual impairment     glasses     PAST SURGICAL HISTORY:  Past Surgical History:   Procedure Laterality Date              COLONOSCOPY N/A 2021    Procedure: COLONOSCOPY with polypectomy;  Surgeon: Sivakumar Benson MD;  Location: [facility]     COLONOSCOPY,BIOPSY  11/5/2012    Procedure: COLONOSCOPY, POSSIBLE BIOPSY, POSSIBLE POLYPECTOMY 35122;  Surgeon: Sivakumar Benson MD;  Location: [facility]    COLONOSCOPY,DIAGNOSTIC N/A 5/14/2016    Procedure: COLONOSCOPY, POSSIBLE BIOPSY, POSSIBLE POLYPECTOMY 28559;  Surgeon: Sivakumar Benson MD;  Location: [facility]    CT HEART W/ CALCIUM SCORING  2/16/2009    calcium score of 0    CT HEART W/ CALCIUM SCORING N/A 08/08/2019    calcium score is zero    IMPACT TOOTH REM BONY W/COMP Bilateral 1973    wisdom teeth    KNEE REPLACEMENT SURGERY Right 02/21/2017    right    LASER SURGERY OF CERVIX  1/1986    PERIPHERAL VASCULAR SCREENING HISTORICAL CONV Bilateral 8/1/2016    PAD screen is normal    TOTAL KNEE REPLACEMENT Right 02/22/2017     FAMILY HISTORY:  family history includes Alcohol and Other Disorders Associated in her maternal grandfather; Arthritis in her sister; Breast Cancer in her maternal great-grandmother; Breast Cancer (age of onset: 56) in her sister; Cancer in her paternal grandfather; Cancer (age of onset: 74) in her mother; Cataracts in her mother; Clotting Disorder in her sister; Dementia in her paternal grandmother; Diabetes in her father, paternal grandfather, and sister; Ear Problems in her father; Eye Problems in her maternal grandmother; Heart Disease in her father and maternal grandmother; Heart Disorder in her father and paternal grandfather; Heart Surgery (age of onset: 55) in her father; High Cholesterol in her maternal grandmother and sister; Hypertension in her father, mother, and sister; Kidney Disease in her sister; Lipids in her maternal grandmother; Musculo-skelatal Disorder in her maternal grandmother; Neurological Disorder in her paternal grandmother; Obesity in her sister; Other in her sister; Pulmonary Disease in her maternal grandmother.    SOCIAL HISTORY:   reports that she quit smoking about 41 years ago. Her smoking use included cigarettes. She  has a 6 pack-year smoking history. She has never used smokeless tobacco. She reports current alcohol use of about 7.0 standard drinks of alcohol per week. She reports that she does not use drugs.    ALLERGIES:  Allergies   Allergen Reactions    Amoxicillin-Pot Clavulanate RASH     No peeling/ blisters  No organ damage     MEDICATIONS:  Current Outpatient Medications on File Prior to Visit   Medication Sig Dispense Refill    losartan 100 MG Oral Tab       Vitamin C 500 MG Oral Tab Take 1 tablet (500 mg total) by mouth daily.      acetaminophen 325 MG Oral Tab Take 2 tablets (650 mg total) by mouth every 4 (four) hours as needed.  0    montelukast 10 MG Oral Tab Take 1 tablet (10 mg total) by mouth nightly. 90 tablet 3    atorvastatin 10 MG Oral Tab TAKE 1 TABLET(10 MG) BY MOUTH EVERY DAY 90 tablet 3    Meloxicam 15 MG Oral Tab TAKE 1 TABLET(15 MG) BY MOUTH DAILY 90 tablet 3    Cyanocobalamin (VITAMIN B 12) 100 MCG Oral Lozenge Take 1,000 mcg by mouth daily.   30 lozenge 0    Vitamin D3 2000 units Oral Cap Take 2 capsules (4,000 Units total) by mouth daily. 180 capsule 3    cetirizine 10 MG Oral Tab Take 1 tablet (10 mg total) by mouth daily.      Omeprazole Magnesium 20 MG Oral Tab EC Take 1 tablet (20 mg total) by mouth every morning. 28 tablet 0     No current facility-administered medications on file prior to visit.     REVIEW OF SYSTEMS:  All other systems were reviewed and were negative except for those previously mentioned in the HPI    PHYSICAL EXAMINATION:  General: No acute distress.  Respiratory: Non-labored respirations bilaterally. No audible wheezing  Cardiovascular: Extremities warm and well-perfused.  Abdomen: Soft, nontender, nondistended.   Musculoskeletal: Moves all extremities well, symmetrically.  Extremities: No edema.     NEUROLOGIC EXAMINATION:  Mental status: Alert and oriented x 3  Speech: Clear, fluent  Cranial nerves: PERRLA, EOMI, face symmetric, with normal strength and sensation, tongue  and palate midline, SCM 5/5 bilaterally  Motor:                           RIGHT  Delt 5/5   Bic 5/5  Tri 5/5   HI 5/5    5/5  IP 5/5   Quad 5/5   Ham 5/5   AT 5/5   EHL 5/5 Crispin 5/5                          LEFT    Delt 5/5   Bic 5/5  Tri 5/5   HI 5/5    5/5  IP 5/5   Quad 5/5   Ham 5/5   AT 5/5   EHL 5/5 Crispin 5/5            No pronator drift  Tone: Normal  Atrophy/Fasciculations: None  Sensation: Normal to light touch, symmetric, no neglect  Cerebellar: Normal finger nose finger  Gait: Normal, nondistressed heel toe tandem gait      Reflexes: 2+ throughout, symmetric, no Ophelia's    IMAGING:  MR cervical 3/21/2024: No associated cord signal abnormality or stenosis of the central canal of the level of the craniocervical junction.  Advanced multilevel degenerative changes throughout the cervical spine.  There is stable C3-4 stenosis right sided foraminal stenosis, which is asymptomatic.  Nonunion of C1 fracture, stable displacement.    ASSESSMENT:  67 yoF s/p fall resulting in a C1 burst fracture involving the anterior and posterior arches of C1. Initially this fracture had no complicated radiographic features as there was no significant overhang of the lateral masses of C1 on C2.  Additionally, her neck pain had improved and she had remained neurologically intact.  For all the above reasons, this fracture was managed without a cervical collar.  Unfortunately, in the interim she had a subsequent fall with head trauma and neck extension.  Subsequent imaging demonstrated widening of the fracture gaps along with increasing overhang at the C1-2 joint on the right.  This prompted recommending use of cervical collar at all times.  An MRI of the cervical spine demonstrated no neural element compression.  Her presentation is a complex one as she has remained pain-free.  She also remains neurologically intact.  She again had a CT, that unfortunately showed no significant healing of her fracture with stable  displacement and overhang of the C1 and C2 lateral masses.  We lengthy discussions regarding her imaging findings and went over in detail with her and her .  At this point, I do not think this is going to heal with conservative managements.  Despite her lack of pain, she remains very aware and concerned about not wearing her cervical collar.  After discussing all her options, will they have elected for occiput to C2 fixation and fusion to definitively treat her fracture and protect her neurologic condition.  Her updated MRI demonstrates no neural element compromise and nonunion of her cervical fracture.  Will proceed as planned on 4/9/2024.  I answered all their questions.     Plan:  - O-C2 fixation and fusion after updated MRI cervical    Bartolo King MD  Neurological Surgery    [address], [address]  [place] [id number]  [phone]  Pager [phone]  4/4/2024 12:44 PM      This note was created using a voice-recognition transcribing system. Incorrect words or phrases may have been missed during proofreading. Please interpret accordingly.    Total Time    Established Patient Total Time       30  minutes.      Activities       Preparing to see the patient (chart/tests/imaging review).       Obtaining and/or reviewing separately obtained history.       Performing a medically appropriate examination and/or evaluation.       Counseling and educating the patient/family/caregiver.       Ordering medications, tests, or procedures.       Referring and communicating with other health care professionals (when not separately reported).       Documenting clincal information in the electronic or other health record.       Independently interpreting results (not separately reported).    Communicating results to the patient/family/caregiver.    Care coordination (not separately reported).   show

## 2024-05-02 DIAGNOSIS — Z00.00 ENCOUNTER FOR GENERAL ADULT MEDICAL EXAMINATION W/OUT ABNORMAL FINDINGS: ICD-10-CM

## 2024-05-16 ENCOUNTER — TRANSCRIPTION ENCOUNTER (OUTPATIENT)
Age: 79
End: 2024-05-16

## 2024-05-16 ENCOUNTER — LABORATORY RESULT (OUTPATIENT)
Age: 79
End: 2024-05-16

## 2024-05-23 ENCOUNTER — RX RENEWAL (OUTPATIENT)
Age: 79
End: 2024-05-23

## 2024-06-03 ENCOUNTER — APPOINTMENT (OUTPATIENT)
Dept: FAMILY MEDICINE | Facility: CLINIC | Age: 79
End: 2024-06-03
Payer: MEDICARE

## 2024-06-03 VITALS
WEIGHT: 246 LBS | TEMPERATURE: 97.8 F | OXYGEN SATURATION: 95 % | BODY MASS INDEX: 35.22 KG/M2 | DIASTOLIC BLOOD PRESSURE: 75 MMHG | SYSTOLIC BLOOD PRESSURE: 138 MMHG | HEIGHT: 70 IN | RESPIRATION RATE: 16 BRPM | HEART RATE: 73 BPM

## 2024-06-03 DIAGNOSIS — D63.1 CHRONIC KIDNEY DISEASE, STAGE 3 UNSPECIFIED: ICD-10-CM

## 2024-06-03 DIAGNOSIS — E03.9 HYPOTHYROIDISM, UNSPECIFIED: ICD-10-CM

## 2024-06-03 DIAGNOSIS — Z13.6 ENCOUNTER FOR SCREENING FOR CARDIOVASCULAR DISORDERS: ICD-10-CM

## 2024-06-03 DIAGNOSIS — N18.30 CHRONIC KIDNEY DISEASE, STAGE 3 UNSPECIFIED: ICD-10-CM

## 2024-06-03 DIAGNOSIS — N18.32 CHRONIC KIDNEY DISEASE, STAGE 3B: ICD-10-CM

## 2024-06-03 DIAGNOSIS — Z98.2 PRESENCE OF CEREBROSPINAL FLUID DRAINAGE DEVICE: ICD-10-CM

## 2024-06-03 DIAGNOSIS — R73.03 PREDIABETES.: ICD-10-CM

## 2024-06-03 DIAGNOSIS — E66.9 OBESITY, UNSPECIFIED: ICD-10-CM

## 2024-06-03 DIAGNOSIS — Z23 ENCOUNTER FOR IMMUNIZATION: ICD-10-CM

## 2024-06-03 DIAGNOSIS — C91.10 CHRONIC LYMPHOCYTIC LEUKEMIA OF B-CELL TYPE NOT HAVING ACHIEVED REMISSION: ICD-10-CM

## 2024-06-03 DIAGNOSIS — Z00.00 ENCOUNTER FOR GENERAL ADULT MEDICAL EXAMINATION W/OUT ABNORMAL FINDINGS: ICD-10-CM

## 2024-06-03 DIAGNOSIS — Z86.39 PERSONAL HISTORY OF OTHER ENDOCRINE, NUTRITIONAL AND METABOLIC DISEASE: ICD-10-CM

## 2024-06-03 LAB
25(OH)D3 SERPL-MCNC: 36.1 NG/ML
ALBUMIN SERPL ELPH-MCNC: 4.6 G/DL
ALP BLD-CCNC: 96 U/L
ALT SERPL-CCNC: 27 U/L
ANION GAP SERPL CALC-SCNC: 15 MMOL/L
AST SERPL-CCNC: 22 U/L
BASOPHILS # BLD AUTO: 0.07 K/UL
BASOPHILS NFR BLD AUTO: 0.1 %
BILIRUB SERPL-MCNC: 0.4 MG/DL
BUN SERPL-MCNC: 34 MG/DL
CALCIUM SERPL-MCNC: 9.5 MG/DL
CHLORIDE SERPL-SCNC: 110 MMOL/L
CHOLEST SERPL-MCNC: 144 MG/DL
CO2 SERPL-SCNC: 21 MMOL/L
CREAT SERPL-MCNC: 1.87 MG/DL
EGFR: 37 ML/MIN/1.73M2
EOSINOPHIL # BLD AUTO: 0.1 K/UL
EOSINOPHIL NFR BLD AUTO: 0.2 %
ESTIMATED AVERAGE GLUCOSE: 123 MG/DL
FOLATE SERPL-MCNC: >20 NG/ML
GLUCOSE SERPL-MCNC: 169 MG/DL
HBA1C MFR BLD HPLC: 5.9 %
HCT VFR BLD CALC: 44.3 %
HDLC SERPL-MCNC: 57 MG/DL
HGB BLD-MCNC: 13.9 G/DL
IMM GRANULOCYTES NFR BLD AUTO: 0.3 %
IRON SERPL-MCNC: 70 UG/DL
LDLC SERPL CALC-MCNC: 42 MG/DL
LYMPHOCYTES # BLD AUTO: 49.15 K/UL
LYMPHOCYTES NFR BLD AUTO: 84.9 %
MAN DIFF?: NORMAL
MCHC RBC-ENTMCNC: 31.4 GM/DL
MCHC RBC-ENTMCNC: 32.1 PG
MCV RBC AUTO: 102.3 FL
MONOCYTES # BLD AUTO: 2.3 K/UL
MONOCYTES NFR BLD AUTO: 4 %
NEUTROPHILS # BLD AUTO: 6.05 K/UL
NEUTROPHILS NFR BLD AUTO: 10.5 %
NONHDLC SERPL-MCNC: 87 MG/DL
PLATELET # BLD AUTO: 173 K/UL
POTASSIUM SERPL-SCNC: 4.6 MMOL/L
PROT SERPL-MCNC: 6.5 G/DL
PSA FREE FLD-MCNC: 35 %
PSA FREE SERPL-MCNC: 0.1 NG/ML
PSA SERPL-MCNC: 0.28 NG/ML
RBC # BLD: 4.33 M/UL
RBC # FLD: 13.5 %
SODIUM SERPL-SCNC: 146 MMOL/L
T4 FREE SERPL-MCNC: 1.1 NG/DL
TRIGL SERPL-MCNC: 294 MG/DL
TSH SERPL-ACNC: 3.21 UIU/ML
VIT B12 SERPL-MCNC: 904 PG/ML
WBC # FLD AUTO: 57.86 K/UL

## 2024-06-03 PROCEDURE — G0447 BEHAVIOR COUNSEL OBESITY 15M: CPT

## 2024-06-03 PROCEDURE — 93000 ELECTROCARDIOGRAM COMPLETE: CPT

## 2024-06-03 PROCEDURE — G0439: CPT

## 2024-06-03 RX ORDER — ORAL SEMAGLUTIDE 14 MG/1
14 TABLET ORAL
Qty: 30 | Refills: 6 | Status: ACTIVE | COMMUNITY
Start: 2024-06-03 | End: 1900-01-01

## 2024-06-03 NOTE — HEALTH RISK ASSESSMENT
[Good] : ~his/her~  mood as  good [No] : No [No falls in past year] : Patient reported no falls in the past year [0] : 2) Feeling down, depressed, or hopeless: Not at all (0) [PHQ-2 Negative - No further assessment needed] : PHQ-2 Negative - No further assessment needed [Former] : Former [> 15 Years] : > 15 Years [de-identified] :  1x week [Change in mental status noted] : No change in mental status noted [None] : None [With Significant Other] : lives with significant other [] :  [Reports changes in hearing] : Reports no changes in hearing [Reports changes in vision] : Reports no changes in vision [Reports changes in dental health] : Reports no changes in dental health [Smoke Detector] : smoke detector [Carbon Monoxide Detector] : carbon monoxide detector

## 2024-06-03 NOTE — HISTORY OF PRESENT ILLNESS
[de-identified] : 77 y/o M PMHx HTN,  CLL s/p  right VPS presents for Medicare Wellness  PE.Pt doing well. States when he coughs, sneezes or rains "he gets HA lasting  few minutes"

## 2024-06-25 RX ORDER — VALSARTAN 160 MG/1
160 TABLET, COATED ORAL
Qty: 90 | Refills: 2 | Status: ACTIVE | COMMUNITY
Start: 2023-06-15 | End: 1900-01-01

## 2024-06-25 RX ORDER — ASPIRIN ENTERIC COATED TABLETS 81 MG 81 MG/1
81 TABLET, DELAYED RELEASE ORAL DAILY
Qty: 90 | Refills: 3 | Status: ACTIVE | COMMUNITY
Start: 2024-02-06 | End: 1900-01-01

## 2024-06-25 RX ORDER — UBIDECARENONE/VIT E ACET 100MG-5
50 MCG CAPSULE ORAL
Qty: 90 | Refills: 2 | Status: ACTIVE | COMMUNITY
Start: 1900-01-01 | End: 1900-01-01

## 2024-06-25 RX ORDER — ROSUVASTATIN CALCIUM 40 MG/1
40 TABLET, FILM COATED ORAL
Qty: 90 | Refills: 2 | Status: ACTIVE | COMMUNITY
Start: 2024-02-06 | End: 1900-01-01

## 2024-06-25 RX ORDER — FINASTERIDE 5 MG/1
5 TABLET, FILM COATED ORAL DAILY
Qty: 90 | Refills: 3 | Status: ACTIVE | COMMUNITY
Start: 2020-08-05 | End: 1900-01-01

## 2024-07-25 ENCOUNTER — APPOINTMENT (OUTPATIENT)
Dept: NEUROLOGY | Facility: CLINIC | Age: 79
End: 2024-07-25
Payer: MEDICARE

## 2024-07-25 VITALS
SYSTOLIC BLOOD PRESSURE: 154 MMHG | HEIGHT: 70 IN | BODY MASS INDEX: 35.79 KG/M2 | HEART RATE: 60 BPM | WEIGHT: 250 LBS | DIASTOLIC BLOOD PRESSURE: 89 MMHG

## 2024-07-25 DIAGNOSIS — R53.81 OTHER MALAISE: ICD-10-CM

## 2024-07-25 PROCEDURE — 99215 OFFICE O/P EST HI 40 MIN: CPT

## 2024-07-25 PROCEDURE — G2211 COMPLEX E/M VISIT ADD ON: CPT

## 2024-07-26 NOTE — HISTORY OF PRESENT ILLNESS
[FreeTextEntry1] : 78 year- old, RH male with PMHX of CLL, hypothyroid, HTN, HLD, gout, BPH s/p prostate ablation Nov 2022 presenting for follow-up of NPH.  HPI  He was fist seen in April 2023, referred for Parkinson's disease, and being treated with moderately high dose of L-Dopa. Clinically he did not have PD, and was weaned off from L-Dopa, and MRI confirmed NPH, and he was referred to Dr. Kirby for  shunt (Right VPS (certas @ 5) 7/19/23.), and then had acute rehabilitation at [place].   On 8/10/23 pt notified recent onset of mild headaches at night. CTH showed decreased ventricular size with new mixed density right subdural hematoma 1.2 cm and right to left midline shift approximately 3mm, and on 8/31/23 he had Embolization of cerebral vessel (Right MMA). VPS setting decreased to 7. More recently VPS setting decreased to 5.  Interval visit 7/25/24 Presenting for follow up of NPH after clarifying that patient does not have PD and weaning off the cinemet. Denied falls in the past 1 year since  shunt placement. Reported improved cognition in the past year. Endorsed headaches 3-5x week after sneezing/coughing improved with tylenol, denied photophobia, phonophobia, nausea, preference for dark room, restlessness. His headaches are not like those he developed with the subdural hematoma. Had distance history of caffeine withdrawal headache.  Meds levothyroxine 88mcg rosuvastatin 40mg qd metoprool 100mg D3 2000 IU 50mcg fenasteride 5mg valsartan 160mg qd aspirin 81mg allopurinol 100mg centrum silver

## 2024-07-26 NOTE — ASSESSMENT
[FreeTextEntry1] : 78 year- old, RH male with PMHX of CLL, hypothyroid, HTN, HLD, gout, BPH s/p prostate ablation Nov 2022 presenting for follow-up of NPH. After weaning off cinemet his previous cognitive decline and gait instability did not return; instead after his  shunt these issues resolved for the most part, supporting NPH as the cause of his initial neurologic findings. His stride length is still slightly reduced and his arm swing L>R is reduced, which could be a result of deconditioning as he reports not exercising regularly. His headaches do not fit with migraine/tension headache/cluster headache in their character, however they resolve with tylenol. Less likely another subdural hematoma since his headache back then was persistent and not resolving with medication.  PLAN Continues to do very well, however he remains very sedentary, and he is  - counseled to exercise and push himself to go outside - referred to PT - continue tylenol for headaches, fu with NSGY if headache character changes; if headache becomes severe and/or persistent visit ED immediately for evaluation  Encouraged to increase exercise and physical activity and maintain an active social and intellectual life. More than 50% of the visit were dedicated to review of treatment, therapeutic plan, and prognosis, and patient was counseled on coping and physical and emotional wellbeing.

## 2024-07-26 NOTE — HISTORY OF PRESENT ILLNESS
[FreeTextEntry1] : 78 year- old, RH male with PMHX of CLL, hypothyroid, HTN, HLD, gout, BPH s/p prostate ablation Nov 2022 presenting for follow-up of NPH.  HPI  He was fist seen in April 2023, referred for Parkinson's disease, and being treated with moderately high dose of L-Dopa. Clinically he did not have PD, and was weaned off from L-Dopa, and MRI confirmed NPH, and he was referred to Dr. Kirby for  shunt (Right VPS (certas @ 5) 7/19/23.), and then had acute rehabilitation at [place].   On 8/10/23 pt notified recent onset of mild headaches at night. CTH showed decreased ventricular size with new mixed density right subdural hematoma 1.2 cm and right to left midline shift approximately 3mm, and on 8/31/23 he had Embolization of cerebral vessel (Right MMA). VPS setting decreased to 7. More recently VPS setting decreased to 5.  Interval visit 7/25/24 Presenting for follow up of NPH after clarifying that patient does not have PD and weaning off the cinemet. Denied falls in the past 1 year since  shunt placement. Reported improved cognition in the past year. Endorsed headaches 3-5x week after sneezing/coughing improved with tylenol, denied photophobia, phonophobia, nausea, preference for dark room, restlessness. His headaches are not like those he developed with the subdural hematoma. Had distance history of caffeine withdrawal headache.  Meds levothyroxine 88mcg rosuvastatin 40mg qd metoprool 100mg D3 2000 IU 50mcg fenasteride 5mg valsartan 160mg qd aspirin 81mg allopurinol 100mg centrum silver

## 2024-07-29 ENCOUNTER — APPOINTMENT (OUTPATIENT)
Dept: FAMILY MEDICINE | Facility: CLINIC | Age: 79
End: 2024-07-29

## 2024-09-09 NOTE — PATIENT PROFILE ADULT - HEALTH LITERACY
Patient's INR is therapeutic at 2.9. Patient confirms he followed previous instructions. Patient reports no changes. Instructions given: Continue warfarin 7.5 mg on Mondays and Wednesdays; and 5 mg all other days. Recheck on 9/23/24 with other appointment. Calendar reviewed with patient. Patient verbalizes understanding.      no

## 2024-10-10 ENCOUNTER — APPOINTMENT (OUTPATIENT)
Dept: NEPHROLOGY | Facility: CLINIC | Age: 79
End: 2024-10-10
Payer: MEDICARE

## 2024-10-10 VITALS
SYSTOLIC BLOOD PRESSURE: 133 MMHG | HEIGHT: 70 IN | OXYGEN SATURATION: 95 % | WEIGHT: 238.1 LBS | TEMPERATURE: 98.1 F | HEART RATE: 58 BPM | DIASTOLIC BLOOD PRESSURE: 73 MMHG | BODY MASS INDEX: 34.09 KG/M2

## 2024-10-10 VITALS — SYSTOLIC BLOOD PRESSURE: 134 MMHG | DIASTOLIC BLOOD PRESSURE: 70 MMHG

## 2024-10-10 DIAGNOSIS — N18.32 CHRONIC KIDNEY DISEASE, STAGE 3B: ICD-10-CM

## 2024-10-10 DIAGNOSIS — I10 ESSENTIAL (PRIMARY) HYPERTENSION: ICD-10-CM

## 2024-10-10 DIAGNOSIS — R80.9 PROTEINURIA, UNSPECIFIED: ICD-10-CM

## 2024-10-10 DIAGNOSIS — E87.0 HYPEROSMOLALITY AND HYPERNATREMIA: ICD-10-CM

## 2024-10-10 PROCEDURE — 99214 OFFICE O/P EST MOD 30 MIN: CPT

## 2024-10-10 PROCEDURE — G2211 COMPLEX E/M VISIT ADD ON: CPT

## 2024-10-11 LAB
25(OH)D3 SERPL-MCNC: 38.8 NG/ML
ALBUMIN SERPL ELPH-MCNC: 4.6 G/DL
ANION GAP SERPL CALC-SCNC: 12 MMOL/L
BUN SERPL-MCNC: 24 MG/DL
CALCIUM SERPL-MCNC: 9.6 MG/DL
CHLORIDE SERPL-SCNC: 108 MMOL/L
CO2 SERPL-SCNC: 21 MMOL/L
CREAT SERPL-MCNC: 1.69 MG/DL
EGFR: 41 ML/MIN/1.73M2
ESTIMATED AVERAGE GLUCOSE: 120 MG/DL
GLUCOSE SERPL-MCNC: 116 MG/DL
HBA1C MFR BLD HPLC: 5.8 %
PHOSPHATE SERPL-MCNC: 3.3 MG/DL
POTASSIUM SERPL-SCNC: 5 MMOL/L
SODIUM SERPL-SCNC: 141 MMOL/L
URATE SERPL-MCNC: 7.5 MG/DL

## 2024-10-18 LAB
CREAT SPEC-SCNC: 146 MG/DL
MICROALBUMIN 24H UR DL<=1MG/L-MCNC: 52.8 MG/DL
MICROALBUMIN/CREAT 24H UR-RTO: 361 MG/G

## 2024-12-13 NOTE — PATIENT PROFILE ADULT - HAVE YOU BEEN EATING POORLY BECAUSE OF A DECREASED APPETITE?
Bedside report received from off going RN: Cherrie RN , assumed care of patient.  POC discussed with patient. Call light within reach, all needs addressed at this time.       Fall risk interventions in place: Move the patient closer to the nurse's station, Patient's personal possessions are with in their safe reach, Place socks on patient, Place fall risk sign on patient's door, Give patient urinal if applicable, and Keep floor surfaces clean and dry (all applicable per [place] Fall risk assessment)   Continuous monitoring: Cardiac Leads, Pulse Ox, or Blood Pressure  IVF/IV medications: Infusion per MAR (List Med(s)) NS  Oxygen: Room Air  Bedside sitter: Not Applicable   Isolation: Not Applicable        No (0)

## 2025-02-03 ENCOUNTER — RX RENEWAL (OUTPATIENT)
Age: 80
End: 2025-02-03

## 2025-02-04 ENCOUNTER — APPOINTMENT (OUTPATIENT)
Dept: UROLOGY | Facility: CLINIC | Age: 80
End: 2025-02-04
Payer: MEDICARE

## 2025-02-04 DIAGNOSIS — Z98.2 PRESENCE OF CEREBROSPINAL FLUID DRAINAGE DEVICE: ICD-10-CM

## 2025-02-04 DIAGNOSIS — G91.2 (IDIOPATHIC) NORMAL PRESSURE HYDROCEPHALUS: ICD-10-CM

## 2025-02-04 DIAGNOSIS — N39.41 URGE INCONTINENCE: ICD-10-CM

## 2025-02-04 DIAGNOSIS — N40.1 BENIGN PROSTATIC HYPERPLASIA WITH LOWER URINARY TRACT SYMPMS: ICD-10-CM

## 2025-02-04 DIAGNOSIS — N31.9 NEUROMUSCULAR DYSFUNCTION OF BLADDER, UNSPECIFIED: ICD-10-CM

## 2025-02-04 DIAGNOSIS — R35.1 BENIGN PROSTATIC HYPERPLASIA WITH LOWER URINARY TRACT SYMPMS: ICD-10-CM

## 2025-02-04 DIAGNOSIS — N32.81 OVERACTIVE BLADDER: ICD-10-CM

## 2025-02-04 PROCEDURE — 99213 OFFICE O/P EST LOW 20 MIN: CPT | Mod: 93

## 2025-02-12 LAB
APPEARANCE: CLEAR
BACTERIA UR CULT: NORMAL
BACTERIA: NEGATIVE /HPF
BILIRUBIN URINE: NEGATIVE
BLOOD URINE: NEGATIVE
CAST: 2 /LPF
COLOR: YELLOW
EPITHELIAL CELLS: 5 /HPF
GLUCOSE QUALITATIVE U: NEGATIVE MG/DL
KETONES URINE: NEGATIVE MG/DL
LEUKOCYTE ESTERASE URINE: NEGATIVE
MICROSCOPIC-UA: NORMAL
NITRITE URINE: NEGATIVE
PH URINE: 5.5
PROTEIN URINE: 300 MG/DL
RED BLOOD CELLS URINE: 3 /HPF
SPECIFIC GRAVITY URINE: 1.02
UROBILINOGEN URINE: 0.2 MG/DL
WHITE BLOOD CELLS URINE: 2 /HPF

## 2025-02-13 ENCOUNTER — APPOINTMENT (OUTPATIENT)
Dept: UROLOGY | Facility: CLINIC | Age: 80
End: 2025-02-13

## 2025-02-27 ENCOUNTER — APPOINTMENT (OUTPATIENT)
Dept: UROLOGY | Facility: CLINIC | Age: 80
End: 2025-02-27

## 2025-02-27 VITALS
TEMPERATURE: 98.2 F | HEIGHT: 70 IN | WEIGHT: 238 LBS | SYSTOLIC BLOOD PRESSURE: 127 MMHG | RESPIRATION RATE: 16 BRPM | BODY MASS INDEX: 34.07 KG/M2 | OXYGEN SATURATION: 94 % | HEART RATE: 58 BPM | DIASTOLIC BLOOD PRESSURE: 78 MMHG

## 2025-02-27 DIAGNOSIS — N40.1 BENIGN PROSTATIC HYPERPLASIA WITH LOWER URINARY TRACT SYMPMS: ICD-10-CM

## 2025-02-27 DIAGNOSIS — N31.9 NEUROMUSCULAR DYSFUNCTION OF BLADDER, UNSPECIFIED: ICD-10-CM

## 2025-02-27 DIAGNOSIS — R35.0 FREQUENCY OF MICTURITION: ICD-10-CM

## 2025-02-27 DIAGNOSIS — R35.1 BENIGN PROSTATIC HYPERPLASIA WITH LOWER URINARY TRACT SYMPMS: ICD-10-CM

## 2025-02-27 DIAGNOSIS — N39.41 URGE INCONTINENCE: ICD-10-CM

## 2025-02-27 PROCEDURE — 99213 OFFICE O/P EST LOW 20 MIN: CPT

## 2025-02-27 PROCEDURE — 51798 US URINE CAPACITY MEASURE: CPT

## 2025-02-27 RX ORDER — VIBEGRON 75 MG/1
75 TABLET, FILM COATED ORAL
Qty: 90 | Refills: 3 | Status: ACTIVE | COMMUNITY
Start: 2025-02-27 | End: 1900-01-01

## 2025-03-08 ENCOUNTER — APPOINTMENT (OUTPATIENT)
Dept: RADIOLOGY | Facility: HOSPITAL | Age: 80
End: 2025-03-08
Payer: MEDICARE

## 2025-03-08 ENCOUNTER — OUTPATIENT (OUTPATIENT)
Dept: OUTPATIENT SERVICES | Facility: HOSPITAL | Age: 80
LOS: 1 days | End: 2025-03-08
Payer: MEDICARE

## 2025-03-08 DIAGNOSIS — Z98.52 VASECTOMY STATUS: Chronic | ICD-10-CM

## 2025-03-08 DIAGNOSIS — Z00.8 ENCOUNTER FOR OTHER GENERAL EXAMINATION: ICD-10-CM

## 2025-03-08 DIAGNOSIS — Z98.890 OTHER SPECIFIED POSTPROCEDURAL STATES: Chronic | ICD-10-CM

## 2025-03-08 DIAGNOSIS — R07.81 PLEURODYNIA: ICD-10-CM

## 2025-03-08 DIAGNOSIS — E89.0 POSTPROCEDURAL HYPOTHYROIDISM: Chronic | ICD-10-CM

## 2025-03-08 PROCEDURE — 71100 X-RAY EXAM RIBS UNI 2 VIEWS: CPT | Mod: 26,LT

## 2025-03-08 PROCEDURE — 71100 X-RAY EXAM RIBS UNI 2 VIEWS: CPT

## 2025-04-03 ENCOUNTER — APPOINTMENT (OUTPATIENT)
Dept: UROLOGY | Facility: CLINIC | Age: 80
End: 2025-04-03
Payer: MEDICARE

## 2025-04-03 VITALS
OXYGEN SATURATION: 92 % | TEMPERATURE: 97.9 F | DIASTOLIC BLOOD PRESSURE: 82 MMHG | HEART RATE: 56 BPM | SYSTOLIC BLOOD PRESSURE: 168 MMHG

## 2025-04-03 DIAGNOSIS — N31.9 NEUROMUSCULAR DYSFUNCTION OF BLADDER, UNSPECIFIED: ICD-10-CM

## 2025-04-03 DIAGNOSIS — N32.81 OVERACTIVE BLADDER: ICD-10-CM

## 2025-04-03 DIAGNOSIS — N40.1 BENIGN PROSTATIC HYPERPLASIA WITH LOWER URINARY TRACT SYMPMS: ICD-10-CM

## 2025-04-03 DIAGNOSIS — R35.1 BENIGN PROSTATIC HYPERPLASIA WITH LOWER URINARY TRACT SYMPMS: ICD-10-CM

## 2025-04-03 PROCEDURE — 52000 CYSTOURETHROSCOPY: CPT

## 2025-04-08 RX ORDER — MIRABEGRON 25 MG/1
25 TABLET, EXTENDED RELEASE ORAL DAILY
Qty: 90 | Refills: 3 | Status: ACTIVE | COMMUNITY
Start: 2025-04-08 | End: 1900-01-01

## 2025-04-11 ENCOUNTER — APPOINTMENT (OUTPATIENT)
Dept: NEPHROLOGY | Facility: CLINIC | Age: 80
End: 2025-04-11
Payer: MEDICARE

## 2025-04-11 VITALS
HEART RATE: 60 BPM | BODY MASS INDEX: 34.36 KG/M2 | HEIGHT: 70 IN | DIASTOLIC BLOOD PRESSURE: 60 MMHG | WEIGHT: 240 LBS | SYSTOLIC BLOOD PRESSURE: 128 MMHG

## 2025-04-11 DIAGNOSIS — N18.32 CHRONIC KIDNEY DISEASE, STAGE 3B: ICD-10-CM

## 2025-04-11 DIAGNOSIS — R80.9 PROTEINURIA, UNSPECIFIED: ICD-10-CM

## 2025-04-11 DIAGNOSIS — I10 ESSENTIAL (PRIMARY) HYPERTENSION: ICD-10-CM

## 2025-04-11 PROCEDURE — G2211 COMPLEX E/M VISIT ADD ON: CPT

## 2025-04-11 PROCEDURE — 99214 OFFICE O/P EST MOD 30 MIN: CPT

## 2025-04-11 RX ORDER — AMLODIPINE BESYLATE 5 MG/1
5 TABLET ORAL
Refills: 0 | Status: ACTIVE | COMMUNITY

## 2025-05-15 ENCOUNTER — APPOINTMENT (OUTPATIENT)
Dept: UROLOGY | Facility: CLINIC | Age: 80
End: 2025-05-15
Payer: MEDICARE

## 2025-05-15 DIAGNOSIS — N31.9 NEUROMUSCULAR DYSFUNCTION OF BLADDER, UNSPECIFIED: ICD-10-CM

## 2025-05-15 DIAGNOSIS — N32.81 OVERACTIVE BLADDER: ICD-10-CM

## 2025-05-15 DIAGNOSIS — R35.1 BENIGN PROSTATIC HYPERPLASIA WITH LOWER URINARY TRACT SYMPMS: ICD-10-CM

## 2025-05-15 DIAGNOSIS — N40.1 BENIGN PROSTATIC HYPERPLASIA WITH LOWER URINARY TRACT SYMPMS: ICD-10-CM

## 2025-05-15 PROCEDURE — 99213 OFFICE O/P EST LOW 20 MIN: CPT

## 2025-05-15 PROCEDURE — 51798 US URINE CAPACITY MEASURE: CPT

## 2025-05-15 RX ORDER — MIRABEGRON 25 MG/1
25 TABLET, EXTENDED RELEASE ORAL
Qty: 90 | Refills: 3 | Status: ACTIVE | COMMUNITY
Start: 2025-05-15 | End: 1900-01-01

## 2025-05-26 ENCOUNTER — RX RENEWAL (OUTPATIENT)
Age: 80
End: 2025-05-26

## 2025-05-28 DIAGNOSIS — Z00.00 ENCOUNTER FOR GENERAL ADULT MEDICAL EXAMINATION W/OUT ABNORMAL FINDINGS: ICD-10-CM

## 2025-06-03 ENCOUNTER — LABORATORY RESULT (OUTPATIENT)
Age: 80
End: 2025-06-03

## 2025-06-03 LAB
25(OH)D3 SERPL-MCNC: 36.6 NG/ML
ALBUMIN SERPL ELPH-MCNC: 4.7 G/DL
ALP BLD-CCNC: 95 U/L
ALT SERPL-CCNC: 31 U/L
ANION GAP SERPL CALC-SCNC: 15 MMOL/L
AST SERPL-CCNC: 21 U/L
BILIRUB SERPL-MCNC: 0.4 MG/DL
BUN SERPL-MCNC: 25 MG/DL
CALCIUM SERPL-MCNC: 9.6 MG/DL
CHLORIDE SERPL-SCNC: 110 MMOL/L
CHOLEST SERPL-MCNC: 141 MG/DL
CO2 SERPL-SCNC: 19 MMOL/L
CREAT SERPL-MCNC: 1.58 MG/DL
EGFRCR SERPLBLD CKD-EPI 2021: 44 ML/MIN/1.73M2
ESTIMATED AVERAGE GLUCOSE: 126 MG/DL
FOLATE SERPL-MCNC: >20 NG/ML
GLUCOSE SERPL-MCNC: 99 MG/DL
HBA1C MFR BLD HPLC: 6 %
HDLC SERPL-MCNC: 59 MG/DL
IRON SERPL-MCNC: 73 UG/DL
LDLC SERPL-MCNC: 58 MG/DL
NONHDLC SERPL-MCNC: 82 MG/DL
POTASSIUM SERPL-SCNC: 4.8 MMOL/L
PROT SERPL-MCNC: 6.4 G/DL
PSA FREE FLD-MCNC: 26 %
PSA FREE SERPL-MCNC: 0.12 NG/ML
PSA SERPL-MCNC: 0.46 NG/ML
SODIUM SERPL-SCNC: 144 MMOL/L
T4 FREE SERPL-MCNC: 1 NG/DL
TRIGL SERPL-MCNC: 137 MG/DL
TSH SERPL-ACNC: 3.95 UIU/ML
VIT B12 SERPL-MCNC: 790 PG/ML

## 2025-06-04 LAB
BASOPHILS # BLD AUTO: 0 K/UL
BASOPHILS NFR BLD AUTO: 0 %
EOSINOPHIL # BLD AUTO: 0 K/UL
EOSINOPHIL NFR BLD AUTO: 0 %
HCT VFR BLD CALC: 43.4 %
HGB BLD-MCNC: 13.4 G/DL
LYMPHOCYTES # BLD AUTO: 42.72 K/UL
LYMPHOCYTES NFR BLD AUTO: 90 %
MAN DIFF?: NORMAL
MCHC RBC-ENTMCNC: 30.9 G/DL
MCHC RBC-ENTMCNC: 32.8 PG
MCV RBC AUTO: 106.4 FL
MONOCYTES # BLD AUTO: 0.47 K/UL
MONOCYTES NFR BLD AUTO: 1 %
NEUTROPHILS # BLD AUTO: 4.27 K/UL
NEUTROPHILS NFR BLD AUTO: 9 %
PLATELET # BLD AUTO: 155 K/UL
RBC # BLD: 4.08 M/UL
RBC # FLD: 13.2 %
WBC # FLD AUTO: 47.47 K/UL

## 2025-06-05 LAB
APPEARANCE: CLEAR
BACTERIA: NEGATIVE /HPF
BILIRUBIN URINE: NEGATIVE
BLOOD URINE: NEGATIVE
CAST: 1 /LPF
COLOR: YELLOW
EPITHELIAL CELLS: 4 /HPF
GLUCOSE QUALITATIVE U: NEGATIVE MG/DL
KETONES URINE: ABNORMAL MG/DL
LEUKOCYTE ESTERASE URINE: ABNORMAL
MICROSCOPIC-UA: NORMAL
NITRITE URINE: NEGATIVE
PH URINE: 6
PROTEIN URINE: 300 MG/DL
RED BLOOD CELLS URINE: 5 /HPF
SPECIFIC GRAVITY URINE: 1.02
UROBILINOGEN URINE: 1 MG/DL
WHITE BLOOD CELLS URINE: 0 /HPF

## 2025-06-10 ENCOUNTER — APPOINTMENT (OUTPATIENT)
Dept: FAMILY MEDICINE | Facility: CLINIC | Age: 80
End: 2025-06-10
Payer: MEDICARE

## 2025-06-10 ENCOUNTER — NON-APPOINTMENT (OUTPATIENT)
Age: 80
End: 2025-06-10

## 2025-06-10 VITALS
TEMPERATURE: 98 F | HEIGHT: 70 IN | HEART RATE: 64 BPM | DIASTOLIC BLOOD PRESSURE: 60 MMHG | WEIGHT: 242 LBS | RESPIRATION RATE: 16 BRPM | SYSTOLIC BLOOD PRESSURE: 140 MMHG | BODY MASS INDEX: 34.65 KG/M2 | OXYGEN SATURATION: 94 %

## 2025-06-10 PROCEDURE — G0439: CPT

## 2025-06-10 PROCEDURE — G0438: CPT

## 2025-06-10 PROCEDURE — G0447 BEHAVIOR COUNSEL OBESITY 15M: CPT

## 2025-07-23 ENCOUNTER — APPOINTMENT (OUTPATIENT)
Dept: UROLOGY | Facility: CLINIC | Age: 80
End: 2025-07-23
Payer: MEDICARE

## 2025-07-23 ENCOUNTER — NON-APPOINTMENT (OUTPATIENT)
Age: 80
End: 2025-07-23

## 2025-07-23 VITALS — RESPIRATION RATE: 18 BRPM | SYSTOLIC BLOOD PRESSURE: 118 MMHG | HEART RATE: 84 BPM | DIASTOLIC BLOOD PRESSURE: 76 MMHG

## 2025-07-23 DIAGNOSIS — N18.32 CHRONIC KIDNEY DISEASE, STAGE 3B: ICD-10-CM

## 2025-07-23 DIAGNOSIS — N32.81 OVERACTIVE BLADDER: ICD-10-CM

## 2025-07-23 DIAGNOSIS — N39.41 URGE INCONTINENCE: ICD-10-CM

## 2025-07-23 DIAGNOSIS — N40.1 BENIGN PROSTATIC HYPERPLASIA WITH LOWER URINARY TRACT SYMPMS: ICD-10-CM

## 2025-07-23 DIAGNOSIS — N31.9 NEUROMUSCULAR DYSFUNCTION OF BLADDER, UNSPECIFIED: ICD-10-CM

## 2025-07-23 DIAGNOSIS — R35.1 BENIGN PROSTATIC HYPERPLASIA WITH LOWER URINARY TRACT SYMPMS: ICD-10-CM

## 2025-07-23 LAB
BILIRUB UR QL STRIP: NORMAL
CLARITY UR: CLEAR
COLLECTION METHOD: NORMAL
GLUCOSE UR-MCNC: NORMAL
HCG UR QL: 1 EU/DL
HGB UR QL STRIP.AUTO: NORMAL
KETONES UR-MCNC: NORMAL
LEUKOCYTE ESTERASE UR QL STRIP: NORMAL
NITRITE UR QL STRIP: NORMAL
PH UR STRIP: 6
PROT UR STRIP-MCNC: NORMAL
SP GR UR STRIP: 1.02

## 2025-07-23 PROCEDURE — 99213 OFFICE O/P EST LOW 20 MIN: CPT

## 2025-07-23 RX ORDER — SOLIFENACIN SUCCINATE 5 MG/1
5 TABLET ORAL
Qty: 90 | Refills: 3 | Status: ACTIVE | COMMUNITY
Start: 2025-07-23 | End: 1900-01-01

## 2025-08-07 ENCOUNTER — APPOINTMENT (OUTPATIENT)
Dept: NEUROLOGY | Facility: CLINIC | Age: 80
End: 2025-08-07
Payer: MEDICARE

## 2025-08-07 VITALS — DIASTOLIC BLOOD PRESSURE: 77 MMHG | HEART RATE: 63 BPM | SYSTOLIC BLOOD PRESSURE: 136 MMHG

## 2025-08-07 VITALS
SYSTOLIC BLOOD PRESSURE: 128 MMHG | HEART RATE: 60 BPM | DIASTOLIC BLOOD PRESSURE: 80 MMHG | HEIGHT: 70 IN | WEIGHT: 239 LBS | BODY MASS INDEX: 34.22 KG/M2

## 2025-08-07 PROCEDURE — 99215 OFFICE O/P EST HI 40 MIN: CPT

## 2025-08-07 PROCEDURE — G2211 COMPLEX E/M VISIT ADD ON: CPT

## 2025-08-27 ENCOUNTER — APPOINTMENT (OUTPATIENT)
Dept: UROLOGY | Facility: CLINIC | Age: 80
End: 2025-08-27
Payer: MEDICARE

## 2025-08-27 VITALS
HEART RATE: 65 BPM | DIASTOLIC BLOOD PRESSURE: 78 MMHG | RESPIRATION RATE: 16 BRPM | SYSTOLIC BLOOD PRESSURE: 120 MMHG | OXYGEN SATURATION: 94 %

## 2025-08-27 DIAGNOSIS — N40.1 BENIGN PROSTATIC HYPERPLASIA WITH LOWER URINARY TRACT SYMPMS: ICD-10-CM

## 2025-08-27 DIAGNOSIS — N32.81 OVERACTIVE BLADDER: ICD-10-CM

## 2025-08-27 DIAGNOSIS — N18.32 CHRONIC KIDNEY DISEASE, STAGE 3B: ICD-10-CM

## 2025-08-27 DIAGNOSIS — N39.41 URGE INCONTINENCE: ICD-10-CM

## 2025-08-27 DIAGNOSIS — R35.1 BENIGN PROSTATIC HYPERPLASIA WITH LOWER URINARY TRACT SYMPMS: ICD-10-CM

## 2025-08-27 DIAGNOSIS — R31.0 GROSS HEMATURIA: ICD-10-CM

## 2025-08-27 PROCEDURE — 51798 US URINE CAPACITY MEASURE: CPT

## 2025-08-27 PROCEDURE — 99213 OFFICE O/P EST LOW 20 MIN: CPT

## 2025-09-02 ENCOUNTER — LABORATORY RESULT (OUTPATIENT)
Age: 80
End: 2025-09-02

## 2025-09-02 LAB
APPEARANCE: CLEAR
BACTERIA: NEGATIVE /HPF
BILIRUBIN URINE: NEGATIVE
BLOOD URINE: NEGATIVE
CAST: 0 /LPF
COLOR: YELLOW
EPITHELIAL CELLS: 5 /HPF
GLUCOSE QUALITATIVE U: NEGATIVE MG/DL
KETONES URINE: NEGATIVE MG/DL
LEUKOCYTE ESTERASE URINE: ABNORMAL
MICROSCOPIC-UA: NORMAL
NITRITE URINE: NEGATIVE
PH URINE: 6
PROTEIN URINE: 100 MG/DL
RED BLOOD CELLS URINE: 2 /HPF
SPECIFIC GRAVITY URINE: 1.02
URINE CYTOLOGY: NORMAL
UROBILINOGEN URINE: 0.2 MG/DL
WHITE BLOOD CELLS URINE: 15 /HPF

## 2025-09-05 LAB — BACTERIA UR CULT: ABNORMAL

## 2025-09-05 RX ORDER — AMOXICILLIN AND CLAVULANATE POTASSIUM 875; 125 MG/1; MG/1
875-125 TABLET, COATED ORAL
Qty: 1 | Refills: 0 | Status: ACTIVE | COMMUNITY
Start: 2025-09-05 | End: 1900-01-01

## 2025-09-16 ENCOUNTER — APPOINTMENT (OUTPATIENT)
Dept: ULTRASOUND IMAGING | Facility: HOSPITAL | Age: 80
End: 2025-09-16
Payer: MEDICARE

## 2025-09-16 PROCEDURE — 76770 US EXAM ABDO BACK WALL COMP: CPT | Mod: 26

## (undated) DEVICE — DRSG STERISTRIPS 0.5 X 4"

## (undated) DEVICE — ELCTR STRYKER NEPTUNE SMOKE EVACUATION PENCIL (GREEN)

## (undated) DEVICE — TIP METZENBAUM SCISSOR MONOPOLAR ENDOCUT (ORANGE)

## (undated) DEVICE — BIPOLAR FORCEP SYMMETRY BAYONET 7" X 1.5MM SMOOTH (SILVER)

## (undated) DEVICE — PACK SPINE

## (undated) DEVICE — SUT SILK 2-0 30" PSL

## (undated) DEVICE — SUT VICRYL 0 27" UR-6

## (undated) DEVICE — SYR LUER LOK 3CC

## (undated) DEVICE — MARKING PEN W RULER

## (undated) DEVICE — DRSG DERMABOND 0.7ML

## (undated) DEVICE — SUT MONOCRYL 4-0 27" PS-2 UNDYED

## (undated) DEVICE — SUT VICRYL 0 18" CT-2 (POP-OFF)

## (undated) DEVICE — TROCAR COVIDIEN VERSAONE FIXATION CANNULA 5MM

## (undated) DEVICE — ADAPTER LUER STUB 15G

## (undated) DEVICE — STAPLER SKIN PROXIMATE

## (undated) DEVICE — TUBING SMOKE EVAC 3/8" X 10FT FOR NEPTUNE

## (undated) DEVICE — DRSG MASTISOL

## (undated) DEVICE — DRAPE SURGICAL #1010

## (undated) DEVICE — SUT VICRYL PLUS 2-0 18" CT-2 (POP-OFF)

## (undated) DEVICE — CODMAN PERFORATOR 14MM (BLUE)

## (undated) DEVICE — WARMING BLANKET FULL UNDERBODY

## (undated) DEVICE — BLADE SCALPEL SAFETY #11 WITH PLASTIC GREEN HANDLE

## (undated) DEVICE — TROCAR COVIDIEN VERSAPORT BLADELESS OPTICAL 5MM STANDARD

## (undated) DEVICE — SPONGE SURGICAL STRIP 1/2 X 6"

## (undated) DEVICE — GLV 8 PROTEXIS (WHITE)

## (undated) DEVICE — PACK UPPER BODY

## (undated) DEVICE — VENODYNE/SCD SLEEVE CALF MEDIUM

## (undated) DEVICE — Device

## (undated) DEVICE — SUT SILK 2-0 18" TIES

## (undated) DEVICE — DRAPE SPLIT SHEET 77" X 120"

## (undated) DEVICE — GLV 7.5 SENSICARE W ALOE

## (undated) DEVICE — LUBRICATING JELLY ONESHOT 1.25OZ

## (undated) DEVICE — TUBING STRYKER PNEUMOCLEAR HIGH FLOW

## (undated) DEVICE — SUT BOOT STANDARD (ORIGINAL YELLOW) 5 PAIR

## (undated) DEVICE — TUBING STRYKER PNEUMOCLEAR SMOKE EVACUATION HIGH FLOW

## (undated) DEVICE — SUT NUROLON 4-0 8-18" TF (POP-OFF)